# Patient Record
Sex: FEMALE | Race: WHITE | NOT HISPANIC OR LATINO | Employment: PART TIME | URBAN - METROPOLITAN AREA
[De-identification: names, ages, dates, MRNs, and addresses within clinical notes are randomized per-mention and may not be internally consistent; named-entity substitution may affect disease eponyms.]

---

## 2017-01-13 ENCOUNTER — THERAPY VISIT (OUTPATIENT)
Dept: PHYSICAL THERAPY | Facility: CLINIC | Age: 47
End: 2017-01-13
Payer: COMMERCIAL

## 2017-01-13 DIAGNOSIS — R26.9 ABNORMAL GAIT: ICD-10-CM

## 2017-01-13 DIAGNOSIS — Z98.890 S/P MENISCECTOMY: Primary | ICD-10-CM

## 2017-01-13 DIAGNOSIS — M25.561 RIGHT KNEE PAIN, UNSPECIFIED CHRONICITY: ICD-10-CM

## 2017-01-13 PROCEDURE — 97110 THERAPEUTIC EXERCISES: CPT | Mod: GP | Performed by: PHYSICAL THERAPIST

## 2017-01-13 PROCEDURE — 97140 MANUAL THERAPY 1/> REGIONS: CPT | Mod: GP | Performed by: PHYSICAL THERAPIST

## 2017-01-13 PROCEDURE — 97010 HOT OR COLD PACKS THERAPY: CPT | Mod: GP | Performed by: PHYSICAL THERAPIST

## 2017-01-13 ASSESSMENT — ACTIVITIES OF DAILY LIVING (ADL)
SIT WITH YOUR KNEE BENT: ACTIVITY IS MINIMALLY DIFFICULT
AS_A_RESULT_OF_YOUR_KNEE_INJURY,_HOW_WOULD_YOU_RATE_YOUR_CURRENT_LEVEL_OF_DAILY_ACTIVITY?: ABNORMAL
GO DOWN STAIRS: ACTIVITY IS FAIRLY DIFFICULT
KNEE_ACTIVITY_OF_DAILY_LIVING_SUM: 40
HOW_WOULD_YOU_RATE_THE_CURRENT_FUNCTION_OF_YOUR_KNEE_DURING_YOUR_USUAL_DAILY_ACTIVITIES_ON_A_SCALE_FROM_0_TO_100_WITH_100_BEING_YOUR_LEVEL_OF_KNEE_FUNCTION_PRIOR_TO_YOUR_INJURY_AND_0_BEING_THE_INABILITY_TO_PERFORM_ANY_OF_YOUR_USUAL_DAILY_ACTIVITIES?: 60
STAND: ACTIVITY IS MINIMALLY DIFFICULT
WALK: ACTIVITY IS SOMEWHAT DIFFICULT
GIVING WAY, BUCKLING OR SHIFTING OF KNEE: THE SYMPTOM AFFECTS MY ACTIVITY SLIGHTLY
WEAKNESS: THE SYMPTOM AFFECTS MY ACTIVITY SLIGHTLY
STIFFNESS: THE SYMPTOM AFFECTS MY ACTIVITY MODERATELY
SWELLING: THE SYMPTOM AFFECTS MY ACTIVITY SLIGHTLY
GO UP STAIRS: ACTIVITY IS SOMEWHAT DIFFICULT
RAW_SCORE: 40
RISE FROM A CHAIR: ACTIVITY IS MINIMALLY DIFFICULT
SQUAT: ACTIVITY IS SOMEWHAT DIFFICULT
HOW_WOULD_YOU_RATE_THE_OVERALL_FUNCTION_OF_YOUR_KNEE_DURING_YOUR_USUAL_DAILY_ACTIVITIES?: ABNORMAL
KNEE_ACTIVITY_OF_DAILY_LIVING_SCORE: 57.14
KNEEL ON THE FRONT OF YOUR KNEE: ACTIVITY IS VERY DIFFICULT
LIMPING: THE SYMPTOM AFFECTS MY ACTIVITY SLIGHTLY
PAIN: THE SYMPTOM AFFECTS MY ACTIVITY MODERATELY

## 2017-01-19 ENCOUNTER — THERAPY VISIT (OUTPATIENT)
Dept: PHYSICAL THERAPY | Facility: CLINIC | Age: 47
End: 2017-01-19
Payer: COMMERCIAL

## 2017-01-19 DIAGNOSIS — R26.9 ABNORMAL GAIT: ICD-10-CM

## 2017-01-19 DIAGNOSIS — Z98.890 S/P MENISCECTOMY: Primary | ICD-10-CM

## 2017-01-19 DIAGNOSIS — M25.561 RIGHT KNEE PAIN, UNSPECIFIED CHRONICITY: ICD-10-CM

## 2017-01-19 PROCEDURE — 97010 HOT OR COLD PACKS THERAPY: CPT | Mod: GP | Performed by: PHYSICAL THERAPIST

## 2017-01-19 PROCEDURE — 97140 MANUAL THERAPY 1/> REGIONS: CPT | Mod: GP | Performed by: PHYSICAL THERAPIST

## 2017-01-19 PROCEDURE — 97110 THERAPEUTIC EXERCISES: CPT | Mod: GP | Performed by: PHYSICAL THERAPIST

## 2017-01-19 ASSESSMENT — ACTIVITIES OF DAILY LIVING (ADL)
WALK: ACTIVITY IS SOMEWHAT DIFFICULT
KNEE_ACTIVITY_OF_DAILY_LIVING_SCORE: 55.71
GO UP STAIRS: ACTIVITY IS SOMEWHAT DIFFICULT
WEAKNESS: THE SYMPTOM AFFECTS MY ACTIVITY SLIGHTLY
RAW_SCORE: 39
KNEE_ACTIVITY_OF_DAILY_LIVING_SUM: 39
SQUAT: ACTIVITY IS FAIRLY DIFFICULT
STIFFNESS: THE SYMPTOM AFFECTS MY ACTIVITY MODERATELY
RISE FROM A CHAIR: ACTIVITY IS MINIMALLY DIFFICULT
GO DOWN STAIRS: ACTIVITY IS FAIRLY DIFFICULT
PAIN: THE SYMPTOM AFFECTS MY ACTIVITY MODERATELY
AS_A_RESULT_OF_YOUR_KNEE_INJURY,_HOW_WOULD_YOU_RATE_YOUR_CURRENT_LEVEL_OF_DAILY_ACTIVITY?: ABNORMAL
LIMPING: THE SYMPTOM AFFECTS MY ACTIVITY SLIGHTLY
GIVING WAY, BUCKLING OR SHIFTING OF KNEE: THE SYMPTOM AFFECTS MY ACTIVITY SLIGHTLY
SIT WITH YOUR KNEE BENT: ACTIVITY IS MINIMALLY DIFFICULT
HOW_WOULD_YOU_RATE_THE_CURRENT_FUNCTION_OF_YOUR_KNEE_DURING_YOUR_USUAL_DAILY_ACTIVITIES_ON_A_SCALE_FROM_0_TO_100_WITH_100_BEING_YOUR_LEVEL_OF_KNEE_FUNCTION_PRIOR_TO_YOUR_INJURY_AND_0_BEING_THE_INABILITY_TO_PERFORM_ANY_OF_YOUR_USUAL_DAILY_ACTIVITIES?: 65
HOW_WOULD_YOU_RATE_THE_OVERALL_FUNCTION_OF_YOUR_KNEE_DURING_YOUR_USUAL_DAILY_ACTIVITIES?: ABNORMAL
STAND: ACTIVITY IS MINIMALLY DIFFICULT
KNEEL ON THE FRONT OF YOUR KNEE: ACTIVITY IS VERY DIFFICULT
SWELLING: THE SYMPTOM AFFECTS MY ACTIVITY SLIGHTLY

## 2017-02-15 ENCOUNTER — THERAPY VISIT (OUTPATIENT)
Dept: PHYSICAL THERAPY | Facility: CLINIC | Age: 47
End: 2017-02-15
Payer: COMMERCIAL

## 2017-02-15 DIAGNOSIS — M25.561 RIGHT KNEE PAIN, UNSPECIFIED CHRONICITY: ICD-10-CM

## 2017-02-15 DIAGNOSIS — Z98.890 S/P MENISCECTOMY: ICD-10-CM

## 2017-02-15 DIAGNOSIS — R26.9 ABNORMAL GAIT: ICD-10-CM

## 2017-02-15 PROCEDURE — 97140 MANUAL THERAPY 1/> REGIONS: CPT | Mod: GP | Performed by: PHYSICAL THERAPIST

## 2017-02-15 PROCEDURE — 97530 THERAPEUTIC ACTIVITIES: CPT | Mod: GP | Performed by: PHYSICAL THERAPIST

## 2017-02-15 PROCEDURE — 97110 THERAPEUTIC EXERCISES: CPT | Mod: GP | Performed by: PHYSICAL THERAPIST

## 2017-02-15 ASSESSMENT — ACTIVITIES OF DAILY LIVING (ADL)
GIVING WAY, BUCKLING OR SHIFTING OF KNEE: THE SYMPTOM AFFECTS MY ACTIVITY SLIGHTLY
SWELLING: THE SYMPTOM AFFECTS MY ACTIVITY SLIGHTLY
HOW_WOULD_YOU_RATE_THE_CURRENT_FUNCTION_OF_YOUR_KNEE_DURING_YOUR_USUAL_DAILY_ACTIVITIES_ON_A_SCALE_FROM_0_TO_100_WITH_100_BEING_YOUR_LEVEL_OF_KNEE_FUNCTION_PRIOR_TO_YOUR_INJURY_AND_0_BEING_THE_INABILITY_TO_PERFORM_ANY_OF_YOUR_USUAL_DAILY_ACTIVITIES?: 65
GO DOWN STAIRS: ACTIVITY IS FAIRLY DIFFICULT
KNEE_ACTIVITY_OF_DAILY_LIVING_SCORE: 64.29
STIFFNESS: THE SYMPTOM AFFECTS MY ACTIVITY MODERATELY
KNEEL ON THE FRONT OF YOUR KNEE: ACTIVITY IS FAIRLY DIFFICULT
KNEE_ACTIVITY_OF_DAILY_LIVING_SUM: 45
GO UP STAIRS: ACTIVITY IS SOMEWHAT DIFFICULT
SQUAT: ACTIVITY IS SOMEWHAT DIFFICULT
RISE FROM A CHAIR: ACTIVITY IS NOT DIFFICULT
LIMPING: THE SYMPTOM AFFECTS MY ACTIVITY SLIGHTLY
HOW_WOULD_YOU_RATE_THE_OVERALL_FUNCTION_OF_YOUR_KNEE_DURING_YOUR_USUAL_DAILY_ACTIVITIES?: ABNORMAL
WEAKNESS: THE SYMPTOM AFFECTS MY ACTIVITY SLIGHTLY
SIT WITH YOUR KNEE BENT: ACTIVITY IS NOT DIFFICULT
WALK: ACTIVITY IS MINIMALLY DIFFICULT
PAIN: THE SYMPTOM AFFECTS MY ACTIVITY MODERATELY
AS_A_RESULT_OF_YOUR_KNEE_INJURY,_HOW_WOULD_YOU_RATE_YOUR_CURRENT_LEVEL_OF_DAILY_ACTIVITY?: ABNORMAL
STAND: ACTIVITY IS NOT DIFFICULT
RAW_SCORE: 45

## 2017-02-27 ENCOUNTER — THERAPY VISIT (OUTPATIENT)
Dept: PHYSICAL THERAPY | Facility: CLINIC | Age: 47
End: 2017-02-27
Payer: COMMERCIAL

## 2017-02-27 DIAGNOSIS — Z98.890 S/P MENISCECTOMY: ICD-10-CM

## 2017-02-27 DIAGNOSIS — M25.561 RIGHT KNEE PAIN: ICD-10-CM

## 2017-02-27 DIAGNOSIS — R26.9 ABNORMAL GAIT: ICD-10-CM

## 2017-02-27 PROCEDURE — 97110 THERAPEUTIC EXERCISES: CPT | Mod: GP | Performed by: PHYSICAL THERAPY ASSISTANT

## 2017-02-27 PROCEDURE — 97140 MANUAL THERAPY 1/> REGIONS: CPT | Mod: GP | Performed by: PHYSICAL THERAPY ASSISTANT

## 2017-02-27 PROCEDURE — 97530 THERAPEUTIC ACTIVITIES: CPT | Mod: GP | Performed by: PHYSICAL THERAPY ASSISTANT

## 2017-03-13 ENCOUNTER — THERAPY VISIT (OUTPATIENT)
Dept: PHYSICAL THERAPY | Facility: CLINIC | Age: 47
End: 2017-03-13
Payer: COMMERCIAL

## 2017-03-13 DIAGNOSIS — R26.9 ABNORMAL GAIT: ICD-10-CM

## 2017-03-13 DIAGNOSIS — Z98.890 S/P MENISCECTOMY: ICD-10-CM

## 2017-03-13 DIAGNOSIS — M25.561 RIGHT KNEE PAIN: ICD-10-CM

## 2017-03-13 PROCEDURE — 97110 THERAPEUTIC EXERCISES: CPT | Mod: GP | Performed by: PHYSICAL THERAPY ASSISTANT

## 2017-03-13 PROCEDURE — 97140 MANUAL THERAPY 1/> REGIONS: CPT | Mod: GP | Performed by: PHYSICAL THERAPY ASSISTANT

## 2017-03-13 PROCEDURE — 97530 THERAPEUTIC ACTIVITIES: CPT | Mod: GP | Performed by: PHYSICAL THERAPY ASSISTANT

## 2017-03-13 ASSESSMENT — ACTIVITIES OF DAILY LIVING (ADL)
KNEEL ON THE FRONT OF YOUR KNEE: ACTIVITY IS FAIRLY DIFFICULT
LIMPING: THE SYMPTOM AFFECTS MY ACTIVITY MODERATELY
GIVING WAY, BUCKLING OR SHIFTING OF KNEE: THE SYMPTOM AFFECTS MY ACTIVITY SLIGHTLY
RISE FROM A CHAIR: ACTIVITY IS MINIMALLY DIFFICULT
WEAKNESS: THE SYMPTOM AFFECTS MY ACTIVITY SLIGHTLY
SWELLING: THE SYMPTOM AFFECTS MY ACTIVITY SLIGHTLY
PAIN: THE SYMPTOM AFFECTS MY ACTIVITY MODERATELY
SIT WITH YOUR KNEE BENT: ACTIVITY IS NOT DIFFICULT
HOW_WOULD_YOU_RATE_THE_CURRENT_FUNCTION_OF_YOUR_KNEE_DURING_YOUR_USUAL_DAILY_ACTIVITIES_ON_A_SCALE_FROM_0_TO_100_WITH_100_BEING_YOUR_LEVEL_OF_KNEE_FUNCTION_PRIOR_TO_YOUR_INJURY_AND_0_BEING_THE_INABILITY_TO_PERFORM_ANY_OF_YOUR_USUAL_DAILY_ACTIVITIES?: 70
WALK: ACTIVITY IS MINIMALLY DIFFICULT
KNEE_ACTIVITY_OF_DAILY_LIVING_SUM: 42
STAND: ACTIVITY IS MINIMALLY DIFFICULT
SQUAT: ACTIVITY IS SOMEWHAT DIFFICULT
GO UP STAIRS: ACTIVITY IS SOMEWHAT DIFFICULT
GO DOWN STAIRS: ACTIVITY IS FAIRLY DIFFICULT
RAW_SCORE: 42
STIFFNESS: THE SYMPTOM AFFECTS MY ACTIVITY MODERATELY
HOW_WOULD_YOU_RATE_THE_OVERALL_FUNCTION_OF_YOUR_KNEE_DURING_YOUR_USUAL_DAILY_ACTIVITIES?: ABNORMAL
AS_A_RESULT_OF_YOUR_KNEE_INJURY,_HOW_WOULD_YOU_RATE_YOUR_CURRENT_LEVEL_OF_DAILY_ACTIVITY?: ABNORMAL
KNEE_ACTIVITY_OF_DAILY_LIVING_SCORE: 60

## 2017-04-19 DIAGNOSIS — Z12.31 VISIT FOR SCREENING MAMMOGRAM: ICD-10-CM

## 2017-04-19 PROCEDURE — G0202 SCR MAMMO BI INCL CAD: HCPCS | Performed by: RADIOLOGY

## 2017-06-08 ENCOUNTER — OFFICE VISIT (OUTPATIENT)
Dept: FAMILY MEDICINE | Facility: CLINIC | Age: 47
End: 2017-06-08
Payer: COMMERCIAL

## 2017-06-08 ENCOUNTER — RADIANT APPOINTMENT (OUTPATIENT)
Dept: GENERAL RADIOLOGY | Facility: CLINIC | Age: 47
End: 2017-06-08
Attending: NURSE PRACTITIONER
Payer: COMMERCIAL

## 2017-06-08 VITALS
HEIGHT: 65 IN | TEMPERATURE: 98.3 F | HEART RATE: 72 BPM | SYSTOLIC BLOOD PRESSURE: 140 MMHG | OXYGEN SATURATION: 98 % | WEIGHT: 241.4 LBS | BODY MASS INDEX: 40.22 KG/M2 | DIASTOLIC BLOOD PRESSURE: 80 MMHG

## 2017-06-08 DIAGNOSIS — R03.0 ELEVATED BLOOD PRESSURE READING WITHOUT DIAGNOSIS OF HYPERTENSION: ICD-10-CM

## 2017-06-08 DIAGNOSIS — G89.29 OTHER CHRONIC PAIN: ICD-10-CM

## 2017-06-08 DIAGNOSIS — M79.671 RIGHT FOOT PAIN: ICD-10-CM

## 2017-06-08 DIAGNOSIS — M79.671 RIGHT FOOT PAIN: Primary | ICD-10-CM

## 2017-06-08 LAB
AMPHETAMINES UR QL: NORMAL NG/ML
BARBITURATES UR QL SCN: NORMAL NG/ML
BENZODIAZ UR QL SCN: NORMAL NG/ML
BUPRENORPHINE UR QL: NORMAL NG/ML
CANNABINOIDS UR QL: NORMAL NG/ML
COCAINE UR QL SCN: NORMAL NG/ML
D-METHAMPHET UR QL: NORMAL NG/ML
METHADONE UR QL SCN: NORMAL NG/ML
OPIATES UR QL SCN: NORMAL NG/ML
OXYCODONE UR QL SCN: NORMAL NG/ML
PCP UR QL SCN: NORMAL NG/ML
PROPOXYPH UR QL: NORMAL NG/ML
TRICYCLICS UR QL SCN: NORMAL NG/ML

## 2017-06-08 PROCEDURE — 73630 X-RAY EXAM OF FOOT: CPT | Mod: RT

## 2017-06-08 PROCEDURE — 99213 OFFICE O/P EST LOW 20 MIN: CPT | Performed by: NURSE PRACTITIONER

## 2017-06-08 PROCEDURE — 80306 DRUG TEST PRSMV INSTRMNT: CPT | Performed by: NURSE PRACTITIONER

## 2017-06-08 ASSESSMENT — ANXIETY QUESTIONNAIRES
3. WORRYING TOO MUCH ABOUT DIFFERENT THINGS: SEVERAL DAYS
5. BEING SO RESTLESS THAT IT IS HARD TO SIT STILL: SEVERAL DAYS
IF YOU CHECKED OFF ANY PROBLEMS ON THIS QUESTIONNAIRE, HOW DIFFICULT HAVE THESE PROBLEMS MADE IT FOR YOU TO DO YOUR WORK, TAKE CARE OF THINGS AT HOME, OR GET ALONG WITH OTHER PEOPLE: NOT DIFFICULT AT ALL
2. NOT BEING ABLE TO STOP OR CONTROL WORRYING: NOT AT ALL
6. BECOMING EASILY ANNOYED OR IRRITABLE: SEVERAL DAYS
GAD7 TOTAL SCORE: 3
1. FEELING NERVOUS, ANXIOUS, OR ON EDGE: NOT AT ALL
7. FEELING AFRAID AS IF SOMETHING AWFUL MIGHT HAPPEN: NOT AT ALL

## 2017-06-08 ASSESSMENT — PATIENT HEALTH QUESTIONNAIRE - PHQ9: 5. POOR APPETITE OR OVEREATING: NOT AT ALL

## 2017-06-08 NOTE — PATIENT INSTRUCTIONS
I will let you know if the radiologist sees a fracture, I do not.  Contusions can take time to heal, ibuprofen, ice, elevation, rest can help. Follow up if your symptoms persist more than a few weeks, or worsen.     Foot Contusion  You have a contusion. This is also called a bruise. There is swelling and some bleeding under the skin, but no broken bones. This injury generally takes a few days to a few weeks to heal.  During that time, the bruise will typically change in color from reddish, to purple-blue, to greenish-yellow, then to yellow-brown.  Home care    Elevate the foot to reduce pain and swelling. As much as possible, sit or lie down with the foot raised about the level of your heart. This is especially important during the first 48 hours.    Ice the foot to help reduce pain and swelling. Wrap a cold source (ice pack or ice cubes in a plastic bag) in a thin towel. Apply to the bruised area for 20 minutes every 1 to 2 hours the first day. Continue this 3 to 4 times a day until the pain and swelling goes away.    Unless another medication was prescribed, you can take acetaminophen, ibuprofen, or naproxen to control pain. (If you have chronic liver or kidney disease or ever had a stomach ulcer or GI bleeding, talk with your doctor before using these medicines.)  Follow up  Follow up with your health care provider or our staff as advised. Call if you are not improving within 1 to 2 weeks.  When to seek medical advice   Call your health care provider right away if you have any of the following:    Increased pain or swelling    Foot or leg becomes cold, blue, numb or tingly    Signs of infection: Warmth, drainage, or increased redness or pain around the bruise    Inability to move the injured foot     Frequent bruising for unknown reasons    4836-5423 [employer]. [address] [place] [address], [place] [address]. All rights reserved. This information is not intended as a substitute for professional medical  care. Always follow your healthcare professional's instructions.

## 2017-06-08 NOTE — NURSING NOTE
"Chief Complaint   Patient presents with     Musculoskeletal Problem       Initial /90  Pulse 72  Temp 98.3  F (36.8  C) (Oral)  Ht 5' 5.35\" (1.66 m)  Wt 241 lb 6.4 oz (109.5 kg)  SpO2 98%  BMI 39.74 kg/m2 Estimated body mass index is 39.74 kg/(m^2) as calculated from the following:    Height as of this encounter: 5' 5.35\" (1.66 m).    Weight as of this encounter: 241 lb 6.4 oz (109.5 kg).  Medication Reconciliation: complete     Paz Herndon MA  "

## 2017-06-08 NOTE — MR AVS SNAPSHOT
After Visit Summary   6/8/2017    Janelle Byrd    MRN: [id number]           Patient Information     Date Of Birth          1970        Visit Information        Provider Department      6/8/2017 9:00 AM Maria Esther Farris NP [facility]        Today's Diagnoses     Right foot pain    -  1    Other chronic pain          Care Instructions    I will let you know if the radiologist sees a fracture, I do not.  Contusions can take time to heal, ibuprofen, ice, elevation, rest can help. Follow up if your symptoms persist more than a few weeks, or worsen.     Foot Contusion  You have a contusion. This is also called a bruise. There is swelling and some bleeding under the skin, but no broken bones. This injury generally takes a few days to a few weeks to heal.  During that time, the bruise will typically change in color from reddish, to purple-blue, to greenish-yellow, then to yellow-brown.  Home care    Elevate the foot to reduce pain and swelling. As much as possible, sit or lie down with the foot raised about the level of your heart. This is especially important during the first 48 hours.    Ice the foot to help reduce pain and swelling. Wrap a cold source (ice pack or ice cubes in a plastic bag) in a thin towel. Apply to the bruised area for 20 minutes every 1 to 2 hours the first day. Continue this 3 to 4 times a day until the pain and swelling goes away.    Unless another medication was prescribed, you can take acetaminophen, ibuprofen, or naproxen to control pain. (If you have chronic liver or kidney disease or ever had a stomach ulcer or GI bleeding, talk with your doctor before using these medicines.)  Follow up  Follow up with your health care provider or our staff as advised. Call if you are not improving within 1 to 2 weeks.  When to seek medical advice   Call your health care provider right away if you have any of the following:    Increased pain or swelling    Foot  or leg becomes cold, blue, numb or tingly    Signs of infection: Warmth, drainage, or increased redness or pain around the bruise    Inability to move the injured foot     Frequent bruising for unknown reasons    5888-6085 [employer]. [address] [place] [address]. All rights reserved. This information is not intended as a substitute for professional medical care. Always follow your healthcare professional's instructions.                Follow-ups after your visit        Follow-up notes from your care team     Return if symptoms worsen or fail to improve.      Who to contact     Normal or non-critical lab and imaging results will be communicated to you by [employer], letter or phone within 4 business days after the clinic has received the results. If you do not hear from us within 7 days, please contact the clinic through [employer] or phone. If you have a critical or abnormal lab result, we will notify you by phone as soon as possible.  Submit refill requests through [employer] or call your pharmacy and they will forward the refill request to us. Please allow 3 business days for your refill to be completed.          If you need to speak with a  for additional information , please call: [phone]             Additional Information About Your Visit        [employer] Information     [employer] gives you secure access to your electronic health record. If you see a primary care provider, you can also send messages to your care team and make appointments. If you have questions, please call your primary care clinic.  If you do not have a primary care provider, please call [phone] and they will assist you.        Care EveryWhere ID     This is your Care EveryWhere ID. This could be used by other organizations to access your [facility] medical records  [id number]        Your Vitals Were     Pulse Temperature Height Pulse Oximetry BMI (Body Mass Index)       72 98.3  F (36.8  C) (Oral) 5'  "5.35\" (1.66 m) 98% 39.74 kg/m2        Blood Pressure from Last 3 Encounters:   06/08/17 142/90   12/21/16 126/70   11/15/16 122/78    Weight from Last 3 Encounters:   06/08/17 241 lb 6.4 oz (109.5 kg)   12/21/16 247 lb 9.6 oz (112.3 kg)   12/09/16 246 lb (111.6 kg)              We Performed the Following     Drug Abuse Screen Panel 13, Urine (Pain Care Package)        Primary Care Provider Office Phone # Fax #    Yamilka Hill PA-C [phone]       [address] [address]        Thank you!     Thank you for choosing [facility] [place]  for your care. Our goal is always to provide you with excellent care. Hearing back from our patients is one way we can continue to improve our services. Please take a few minutes to complete the written survey that you may receive in the mail after your visit with us. Thank you!             Your Updated Medication List - Protect others around you: Learn how to safely use, store and throw away your medicines at www.disposemymeds.org.          This list is accurate as of: 6/8/17  9:37 AM.  Always use your most recent med list.                   Brand Name Dispense Instructions for use    ALEVE PO      Take 220 mg by mouth       ibuprofen 600 MG tablet    ADVIL/MOTRIN    30 tablet    Take 1 tablet (600 mg) by mouth every 6 hours as needed for pain (mild)       metroNIDAZOLE 0.75 % topical gel    METROGEL     USE 1 APPLICATION QD       Multi-vitamin Tabs tablet   Generic drug:  multivitamin, therapeutic with minerals      1 tablet daily       nystatin 104939 UNIT/GM Powd    MYCOSTATIN    30 g    Apply topically 3 times daily as needed         "

## 2017-06-08 NOTE — PROGRESS NOTES
SUBJECTIVE:                                                    Janelle Byrd is a 46 year old female who presents to clinic today for the following health issues:      Muscle/Joint Pain     Onset: 2 weeks    Description:   Location: right foot  Character: Sharp    Progression of Symptoms: worse    Accompanying Signs & Symptoms:  Other symptoms: swelling, pain when wiggling toes    History:   Previous similar pain: no       Precipitating factors:   Trauma or overuse: YES- dropped a bottle of shampoo on it    Alleviating factors:  Improved by: nothing  Therapies Tried and outcome: ibu-no improvement, ice helps for a while then pain comes back         Problem list and histories reviewed & adjusted, as indicated.  Additional history: as documented    Patient Active Problem List   Diagnosis     CARDIOVASCULAR SCREENING; LDL GOAL LESS THAN 160     Umbilical discharge     Hip pain     Low back pain     Obesity     Anxiety     Chronic pain of right knee     Primary osteoarthritis of right knee     Complex tear of medial meniscus of right knee as current injury, initial encounter     S/P meniscectomy     Right knee pain     Abnormal gait     Past Surgical History:   Procedure Laterality Date     ARTHROSCOPY SHOULDER  10/2004    LT/to remove calcium deposits     ARTHROSCOPY SHOULDER DECOMPRESSION Left 2014    Procedure: ARTHROSCOPY SHOULDER DECOMPRESSION;  Surgeon: Julien Maciel MD;  Location: [facility] OR     BIOPSY  2001    skin cancer from nose     C/SECTION, LOW TRANSVERSE  , 2008    , Low Transverse     DILATION AND CURETTAGE, OPERATIVE HYSTEROSCOPY WITH MORCELLATOR, COMBINED N/A 2016    Procedure: COMBINED DILATION AND CURETTAGE, OPERATIVE HYSTEROSCOPY WITH MORCELLATOR;  Surgeon: Idalia Mcarthur MD;  Location: [facility] OR       Social History   Substance Use Topics     Smoking status: Never Smoker     Smokeless tobacco: Never Used     Alcohol use Yes      Comment: rare      Family History   Problem Relation Age of Onset     Lipids Mother      Arthritis Mother      Psychotic Disorder Mother      Respiratory Mother      copd     Circulatory Mother      BLOOD CLOTS     Genitourinary Problems Mother      Heart Failure Mother      Depression Mother      MENTAL ILLNESS Mother      Bi-polar     Obesity Mother      Lipids Father      Alcohol/Drug Father      Arthritis Father      C.A.D. Father      Cancer - colorectal Father      Hypertension Father      HEART DISEASE Maternal Grandmother      Breast Cancer Maternal Grandmother      Alcohol/Drug Maternal Grandfather      CEREBROVASCULAR DISEASE Maternal Grandfather      Alcohol/Drug Paternal Grandmother      Hypertension Paternal Grandmother      C.A.D. Paternal Grandfather      GASTROINTESTINAL DISEASE Paternal Grandfather      liver diease     Alcohol/Drug Paternal Grandfather      CEREBROVASCULAR DISEASE Paternal Grandfather      Circulatory Sister      BLOOD CLOTS         Current Outpatient Prescriptions   Medication Sig Dispense Refill     nystatin (MYCOSTATIN) 595156 UNIT/GM POWD Apply topically 3 times daily as needed 30 g 1     Naproxen Sodium (ALEVE PO) Take 220 mg by mouth       metroNIDAZOLE (METROGEL) 0.75 % gel USE 1 APPLICATION QD  3     ibuprofen (ADVIL,MOTRIN) 600 MG tablet Take 1 tablet (600 mg) by mouth every 6 hours as needed for pain (mild) 30 tablet 0     MULTI-VITAMIN OR TABS 1 tablet daily  0     Allergies   Allergen Reactions     Depo-Medrol [Methylprednisolone] Other (See Comments)     Oral thrush after receiving an intra-articular injection, reports that she has family members that have also had this symptom following intra-articular injections.     Dust Mites      Sneezing     Mold Cough     Tape [Adhesive Tape] Rash     BP Readings from Last 3 Encounters:   06/08/17 140/80   12/21/16 126/70   11/15/16 122/78    Wt Readings from Last 3 Encounters:   06/08/17 241 lb 6.4 oz (109.5 kg)   12/21/16 247 lb 9.6 oz (112.3  "kg)   12/09/16 246 lb (111.6 kg)            Labs reviewed in EPIC    Reviewed and updated as needed this visit by clinical staff  Tobacco  Allergies  Meds  Med Hx  Surg Hx  Fam Hx  Soc Hx      Reviewed and updated as needed this visit by Provider         ROS:  Constitutional, HEENT, cardiovascular, pulmonary, GI, , musculoskeletal, neuro, skin, endocrine and psych systems are negative, except as otherwise noted.    OBJECTIVE:                                                    /80  Pulse 72  Temp 98.3  F (36.8  C) (Oral)  Ht 5' 5.35\" (1.66 m)  Wt 241 lb 6.4 oz (109.5 kg)  SpO2 98%  BMI 39.74 kg/m2  Body mass index is 39.74 kg/(m^2).  GENERAL: healthy, alert and no distress  RESP: lungs clear to auscultation - no rales, rhonchi or wheezes  CV: regular rate and rhythm, normal S1 S2, no S3 or S4, no murmur, click or rub, no peripheral edema and peripheral pulses strong  MS: no gross musculoskeletal defects noted, POSITIVE for slight edema to top of R foot, limited ROM of R 4th and 5th toes   SKIN: no suspicious lesions or rashes  NEURO: Normal strength and tone, mentation intact and speech normal  PSYCH: mentation appears normal, affect normal/bright    Diagnostic Test Results:  See orders     ASSESSMENT/PLAN:                                                        BP Screening:   Last 3 BP Readings:    BP Readings from Last 3 Encounters:   06/08/17 140/80   12/21/16 126/70   11/15/16 122/78       The following was recommended to the patient:  Re-screen BP within a year and recommended lifestyle modifications      ICD-10-CM    1. Right foot pain M79.671 XR Foot Right G/E 3 Views   2. Other chronic pain G89.29 Drug Abuse Screen Panel 13, Urine (Pain Care Package)   3. Elevated blood pressure reading without diagnosis of hypertension R03.0        See Patient Instructions: I will let you know if the radiologist sees a fracture, I do not.  Contusions can take time to heal, ibuprofen, ice, elevation, rest " can help. Follow up if your symptoms persist more than a few weeks, or worsen. Discussed monitoring BP and follow-up.     BOB Swenson  [facility] [place]

## 2017-06-08 NOTE — PROGRESS NOTES
Hunter Luis,    Thank you for your recent office visit.    Here are your recent results.  Normal foot xray.     Feel free to contact me via [employer] or call the clinic at [phone].    Sincerely,    JEANETTE Rivas, FNP-BC

## 2017-06-08 NOTE — PROGRESS NOTES
Hunter Luis,    Thank you for your recent office visit.    Here are your recent results.  Normal lab results.     Feel free to contact me via [employer] or call the clinic at [phone].    Sincerely,    JEANETTE Rivas, FNP-BC

## 2017-06-09 ENCOUNTER — MYC MEDICAL ADVICE (OUTPATIENT)
Dept: FAMILY MEDICINE | Facility: CLINIC | Age: 47
End: 2017-06-09

## 2017-06-09 ASSESSMENT — ANXIETY QUESTIONNAIRES: GAD7 TOTAL SCORE: 3

## 2017-06-09 ASSESSMENT — PATIENT HEALTH QUESTIONNAIRE - PHQ9: SUM OF ALL RESPONSES TO PHQ QUESTIONS 1-9: 4

## 2017-11-15 ENCOUNTER — TRANSFERRED RECORDS (OUTPATIENT)
Dept: HEALTH INFORMATION MANAGEMENT | Facility: CLINIC | Age: 47
End: 2017-11-15

## 2018-01-02 ENCOUNTER — OFFICE VISIT (OUTPATIENT)
Dept: FAMILY MEDICINE | Facility: CLINIC | Age: 48
End: 2018-01-02
Payer: COMMERCIAL

## 2018-01-02 VITALS
RESPIRATION RATE: 12 BRPM | HEART RATE: 86 BPM | BODY MASS INDEX: 41.94 KG/M2 | TEMPERATURE: 96.6 F | DIASTOLIC BLOOD PRESSURE: 78 MMHG | SYSTOLIC BLOOD PRESSURE: 120 MMHG | WEIGHT: 254.8 LBS

## 2018-01-02 DIAGNOSIS — R03.0 ELEVATED BLOOD PRESSURE READING WITHOUT DIAGNOSIS OF HYPERTENSION: Primary | ICD-10-CM

## 2018-01-02 PROCEDURE — 99213 OFFICE O/P EST LOW 20 MIN: CPT | Performed by: FAMILY MEDICINE

## 2018-01-02 ASSESSMENT — ANXIETY QUESTIONNAIRES
GAD7 TOTAL SCORE: 9
7. FEELING AFRAID AS IF SOMETHING AWFUL MIGHT HAPPEN: NOT AT ALL
1. FEELING NERVOUS, ANXIOUS, OR ON EDGE: MORE THAN HALF THE DAYS
3. WORRYING TOO MUCH ABOUT DIFFERENT THINGS: SEVERAL DAYS
2. NOT BEING ABLE TO STOP OR CONTROL WORRYING: SEVERAL DAYS
6. BECOMING EASILY ANNOYED OR IRRITABLE: MORE THAN HALF THE DAYS
5. BEING SO RESTLESS THAT IT IS HARD TO SIT STILL: SEVERAL DAYS

## 2018-01-02 ASSESSMENT — PATIENT HEALTH QUESTIONNAIRE - PHQ9: 5. POOR APPETITE OR OVEREATING: MORE THAN HALF THE DAYS

## 2018-01-02 NOTE — NURSING NOTE
"Chief Complaint   Patient presents with     Hypertension       Initial /78  Pulse 86  Temp 96.6  F (35.9  C) (Oral)  Resp 12  Wt 254 lb 12.8 oz (115.6 kg)  BMI 41.94 kg/m2 Estimated body mass index is 41.94 kg/(m^2) as calculated from the following:    Height as of 6/8/17: 5' 5.35\" (1.66 m).    Weight as of this encounter: 254 lb 12.8 oz (115.6 kg).  Medication Reconciliation: complete     Kiera Chirinos MA    "

## 2018-01-02 NOTE — PROGRESS NOTES
SUBJECTIVE:   Janelle Byrd is a 47 year old female who presents to clinic today for the following health issues:      Hypertension Follow-up      Outpatient blood pressures are being checked at home.  Results are 146-173/78-92.    Low Salt Diet: low salt    Amount of exercise or physical activity: 1 day/week for an average of 15-30 minutes    Problems taking medications regularly: No    Medication side effects: none    Diet: regular (no restrictions)    HTN visit, check bp at home with wrist cuff  Usually 120/70 regularly at home, high on cuff yesterday afternoon 173/102 then stayed 160/80's -   felt light headed, some headaches as well, some vision changes as well  Not taking any medications for elevated blood pressure  Exercise - none  Diet - overall healthy    Problem list and histories reviewed & adjusted, as indicated.  Additional history: as documented    BP Readings from Last 3 Encounters:   01/02/18 120/78   06/08/17 140/80   12/21/16 126/70    Wt Readings from Last 3 Encounters:   01/02/18 254 lb 12.8 oz (115.6 kg)   06/08/17 241 lb 6.4 oz (109.5 kg)   12/21/16 247 lb 9.6 oz (112.3 kg)            Reviewed and updated as needed this visit by clinical staffTobacco  Allergies  Meds  Problems       Reviewed and updated as needed this visit by Provider  Allergies  Meds  Problems         ROS:  Constitutional, HEENT, cardiovascular, pulmonary, gi and gu systems are negative, except as otherwise noted.      OBJECTIVE:   /78  Pulse 86  Temp 96.6  F (35.9  C) (Oral)  Resp 12  Wt 254 lb 12.8 oz (115.6 kg)  BMI 41.94 kg/m2  Body mass index is 41.94 kg/(m^2).  GENERAL: healthy, alert and no distress  EYES: Eyes grossly normal to inspection, PERRL and conjunctivae and sclerae normal  HENT: ear canals and TM's normal, nose and mouth without ulcers or lesions  NECK: no adenopathy, no asymmetry, masses, or scars and thyroid normal to palpation  RESP: lungs clear to auscultation - no  rales, rhonchi or wheezes  CV: regular rate and rhythm, normal S1 S2, no S3 or S4, no murmur, click or rub, no peripheral edema and peripheral pulses strong  SKIN: no suspicious lesions or rashes  NEURO: Normal strength and tone, mentation intact and speech normal  PSYCH: mentation appears normal, affect normal/bright    ASSESSMENT/PLAN:     1. Elevated blood pressure reading without diagnosis of hypertension  Possibly situational given stress related to father who is ill with stage 3 lung cancer.  Most of the time her Bp is within normal range.  Bp log was given an patient will continue to monitor.  No medications necessary at this time.      Follow-up in 3 months    Renny Mullen MD  [facility]

## 2018-01-02 NOTE — PATIENT INSTRUCTIONS
[facility]    If you have any questions regarding to your visit please contact your care team:       Team Purple:   Clinic Hours Telephone Number   Dr. Lulu Mullen   7am-7pm  Monday - Thursday   7am-5pm  Fridays  [phone]  (Appointment scheduling available 24/7)    Questions about your Visit?   Team Line:  [phone]   Urgent Care - [place] and [facility] - 11am-9pm Monday-Friday Saturday-Sunday- 9am-5pm   [place] - 5pm-9pm Monday-Friday Saturday-Sunday- 9am-5pm  [phone] - [facility]  [phone] - [place]       What options do I have for visits at the clinic other than the traditional office visit?  To expand how we care for you, many of our providers are utilizing electronic visits (e-visits) and telephone visits, when medically appropriate, for interactions with their patients rather than a visit in the clinic.   We also offer nurse visits for many medical concerns. Just like any other service, we will bill your insurance company for this type of visit based on time spent on the phone with your provider. Not all insurance companies cover these visits. Please check with your medical insurance if this type of visit is covered. You will be responsible for any charges that are not paid by your insurance.      E-visits via [employer]:  generally incur a $35.00 fee.  Telephone visits:  Time spent on the phone: *charged based on time that is spent on the phone in increments of 10 minutes. Estimated cost:   5-10 mins $30.00   11-20 mins. $59.00   21-30 mins. $85.00     Use [email or web address] (secure email communication and access to your chart) to send your primary care provider a message or make an appointment. Ask someone on your Team how to sign up for [employer].  For a Price Quote for your services, please call our Consumer Price Line at [phone].  As always, Thank you for trusting us with your health care  needs!    Kiera Chirinos MA

## 2018-01-02 NOTE — MR AVS SNAPSHOT
After Visit Summary   1/2/2018    Janelle Byrd    MRN: [id number]           Patient Information     Date Of Birth          1970        Visit Information        Provider Department      1/2/2018 11:00 AM Renny Mullen MD [facility]        Care Instructions    [facility]    If you have any questions regarding to your visit please contact your care team:       Team Purple:   Clinic Hours Telephone Number   Dr. Lulu Mullen   7am-7pm  Monday - Thursday   7am-5pm  Fridays  [phone]  (Appointment scheduling available 24/7)    Questions about your Visit?   Team Line:  [phone]   Urgent Care - [place] and [facility] - 11am-9pm Monday-Friday Saturday-Sunday- 9am-5pm   [place] - 5pm-9pm Monday-Friday Saturday-Sunday- 9am-5pm  [phone] - [facility]  [phone] - [place]       What options do I have for visits at the clinic other than the traditional office visit?  To expand how we care for you, many of our providers are utilizing electronic visits (e-visits) and telephone visits, when medically appropriate, for interactions with their patients rather than a visit in the clinic.   We also offer nurse visits for many medical concerns. Just like any other service, we will bill your insurance company for this type of visit based on time spent on the phone with your provider. Not all insurance companies cover these visits. Please check with your medical insurance if this type of visit is covered. You will be responsible for any charges that are not paid by your insurance.      E-visits via AQUA PURE:  generally incur a $35.00 fee.  Telephone visits:  Time spent on the phone: *charged based on time that is spent on the phone in increments of 10 minutes. Estimated cost:   5-10 mins $30.00   11-20 mins. $59.00   21-30 mins. $85.00     Use AQUA PURE (secure  email communication and access to your chart) to send your primary care provider a message or make an appointment. Ask someone on your Team how to sign up for [employer]ia.  For a Price Quote for your services, please call our [place] Price Line at [phone].  As always, Thank you for trusting us with your health care needs!    Kiera Chirinos MA            Follow-ups after your visit        Follow-up notes from your care team     Return in about 3 months (around 4/2/2018).      Who to contact     If you have questions or need follow up information about today's clinic visit or your schedule please contact [facility] directly at [phone].  Normal or non-critical lab and imaging results will be communicated to you by Reach Proshart, letter or phone within 4 business days after the clinic has received the results. If you do not hear from us within 7 days, please contact the clinic through Reach Proshart or phone. If you have a critical or abnormal lab result, we will notify you by phone as soon as possible.  Submit refill requests through CorCardia or call your pharmacy and they will forward the refill request to us. Please allow 3 business days for your refill to be completed.          Additional Information About Your Visit        [employer] Information     CorCardia gives you secure access to your electronic health record. If you see a primary care provider, you can also send messages to your care team and make appointments. If you have questions, please call your primary care clinic.  If you do not have a primary care provider, please call [phone] and they will assist you.        Care EveryWhere ID     This is your Care EveryWhere ID. This could be used by other organizations to access your [place] medical records  [id number]        Your Vitals Were     Pulse Temperature Respirations BMI (Body Mass Index)          86 96.6  F (35.9  C) (Oral) 12 41.94 kg/m2         Blood Pressure from Last 3 Encounters:    01/02/18 120/78   06/08/17 140/80   12/21/16 126/70    Weight from Last 3 Encounters:   01/02/18 254 lb 12.8 oz (115.6 kg)   06/08/17 241 lb 6.4 oz (109.5 kg)   12/21/16 247 lb 9.6 oz (112.3 kg)              Today, you had the following     No orders found for display       Primary Care Provider Office Phone # Fax #    Yamilka Mirian Hill PA-C [phone]       [address] [address]        Equal Access to Services     [facility] HospitalSHELLEY : Hadii fina rice hadkobio Sograzyna, waaxda luqadaha, qaybta kaalmada adeemily, sue mayorga . So [facility] [phone].    ATENCIÓN: Si habla español, tiene a fowler disposición servicios gratuitos de asistencia lingüística. [facility] [phone].    We comply with applicable federal civil rights laws and [place] laws. We do not discriminate on the basis of race, color, national origin, age, disability, sex, sexual orientation, or gender identity.            Thank you!     Thank you for choosing [facility]  for your care. Our goal is always to provide you with excellent care. Hearing back from our patients is one way we can continue to improve our services. Please take a few minutes to complete the written survey that you may receive in the mail after your visit with us. Thank you!             Your Updated Medication List - Protect others around you: Learn how to safely use, store and throw away your medicines at www.disposemymeds.org.          This list is accurate as of: 1/2/18 12:16 PM.  Always use your most recent med list.                   Brand Name Dispense Instructions for use Diagnosis    ALEVE PO      Take 220 mg by mouth        ibuprofen 600 MG tablet    ADVIL/MOTRIN    30 tablet    Take 1 tablet (600 mg) by mouth every 6 hours as needed for pain (mild)    Status post hysteroscopy       metroNIDAZOLE 0.75 % topical gel    METROGEL     USE 1 APPLICATION QD        Multi-vitamin Tabs tablet   Generic drug:   multivitamin, therapeutic with minerals      1 tablet daily        nystatin 628173 UNIT/GM Powd    MYCOSTATIN    30 g    Apply topically 3 times daily as needed    Candidiasis of skin

## 2018-01-03 ASSESSMENT — ANXIETY QUESTIONNAIRES: GAD7 TOTAL SCORE: 9

## 2018-02-06 ENCOUNTER — OFFICE VISIT (OUTPATIENT)
Dept: FAMILY MEDICINE | Facility: CLINIC | Age: 48
End: 2018-02-06
Payer: COMMERCIAL

## 2018-02-06 VITALS
WEIGHT: 254.5 LBS | BODY MASS INDEX: 40.9 KG/M2 | TEMPERATURE: 98.3 F | SYSTOLIC BLOOD PRESSURE: 132 MMHG | HEART RATE: 76 BPM | DIASTOLIC BLOOD PRESSURE: 72 MMHG | HEIGHT: 66 IN

## 2018-02-06 DIAGNOSIS — R63.5 WEIGHT GAIN: ICD-10-CM

## 2018-02-06 DIAGNOSIS — Z00.00 ROUTINE GENERAL MEDICAL EXAMINATION AT A HEALTH CARE FACILITY: Primary | ICD-10-CM

## 2018-02-06 DIAGNOSIS — E66.01 MORBID OBESITY (H): ICD-10-CM

## 2018-02-06 DIAGNOSIS — R06.83 SNORING: ICD-10-CM

## 2018-02-06 PROCEDURE — 80061 LIPID PANEL: CPT | Performed by: PHYSICIAN ASSISTANT

## 2018-02-06 PROCEDURE — 36415 COLL VENOUS BLD VENIPUNCTURE: CPT | Performed by: PHYSICIAN ASSISTANT

## 2018-02-06 PROCEDURE — 82947 ASSAY GLUCOSE BLOOD QUANT: CPT | Performed by: PHYSICIAN ASSISTANT

## 2018-02-06 PROCEDURE — 84443 ASSAY THYROID STIM HORMONE: CPT | Performed by: PHYSICIAN ASSISTANT

## 2018-02-06 PROCEDURE — 84439 ASSAY OF FREE THYROXINE: CPT | Performed by: PHYSICIAN ASSISTANT

## 2018-02-06 PROCEDURE — 99396 PREV VISIT EST AGE 40-64: CPT | Performed by: PHYSICIAN ASSISTANT

## 2018-02-06 NOTE — PROGRESS NOTES
SUBJECTIVE:   CC: Janelle Byrd is an 47 year old woman who presents for preventive health visit.     Physical   Annual:     Getting at least 3 servings of Calcium per day::  Yes    Bi-annual eye exam::  Yes    Dental care twice a year::  Yes    Sleep apnea or symptoms of sleep apnea::  Sleep apnea    Diet::  Regular (no restrictions)    Frequency of exercise::  1 day/week    Duration of exercise::  15-30 minutes    Taking medications regularly::  Yes    Additional concerns today::  YES (Blood pressure at home was higher.  Discuss loosing weight.  Has 2 siblings recently diagnosed with diabetes so would like to be tested for this today.)            1. Requesting diabetes labs - 2 siblings with diabetes  2. Weight - struggling. Stress due to dad's health and not getting exercise. Did better on a paleo style diet and with more exercise - reports that in general she's ready to get back to that. Reports some overeating and eating poor foods at times.     Today's PHQ-2 Score:   PHQ-2 ( 1999 Pfizer) 2/5/2018   Q1: Little interest or pleasure in doing things 0   Q2: Feeling down, depressed or hopeless 0   PHQ-2 Score 0   Q1: Little interest or pleasure in doing things Not at all   Q2: Feeling down, depressed or hopeless Not at all   PHQ-2 Score 0       Abuse: Current or Past(Physical, Sexual or Emotional)- No  Do you feel safe in your environment - Yes    Social History   Substance Use Topics     Smoking status: Never Smoker     Smokeless tobacco: Never Used     Alcohol use Yes      Comment: rare     Alcohol Use 2/5/2018   If you drink alcohol, do you typically have greater than 3 drinks per day OR greater than 7 drinks per week?   No     Reviewed orders with patient.  Reviewed health maintenance and updated orders accordingly - Yes  Labs reviewed in EPIC    Patient under age 50, mutual decision reflected in health maintenance.      Pertinent mammograms are reviewed under the imaging tab.  History of  abnormal Pap smear: NO - age 30- 65 PAP every 3 years recommended    Reviewed and updated as needed this visit by clinical staff         Reviewed and updated as needed this visit by Provider            Review of Systems  C: NEGATIVE for fever, chills, change in weight  I: NEGATIVE for worrisome rashes, moles or lesions  E: NEGATIVE for vision changes or irritation  ENT: NEGATIVE for ear, mouth and throat problems  R: NEGATIVE for significant cough or SOB  B: NEGATIVE for masses, tenderness or discharge  CV: NEGATIVE for chest pain, palpitations or peripheral edema  GI: NEGATIVE for nausea, abdominal pain, heartburn, or change in bowel habits  : NEGATIVE for unusual urinary or vaginal symptoms. Periods are regular.  M: NEGATIVE for significant arthralgias or myalgia  N: NEGATIVE for weakness, dizziness or paresthesias  P: NEGATIVE for changes in mood or affect     OBJECTIVE:   There were no vitals taken for this visit.  Physical Exam  GENERAL: healthy, alert and no distress  EYES: Eyes grossly normal to inspection, PERRL and conjunctivae and sclerae normal  HENT: ear canals and TM's normal, nose and mouth without ulcers or lesions  NECK: no adenopathy, no asymmetry, masses, or scars and thyroid normal to palpation  RESP: lungs clear to auscultation - no rales, rhonchi or wheezes  CV: regular rate and rhythm, normal S1 S2, no S3 or S4, no murmur, click or rub, no peripheral edema and peripheral pulses strong  ABDOMEN: soft, nontender, no hepatosplenomegaly, no masses and bowel sounds normal  MS: no gross musculoskeletal defects noted, no edema  SKIN: no suspicious lesions or rashes  NEURO: Normal strength and tone, mentation intact and speech normal  PSYCH: mentation appears normal, affect normal/bright  LYMPH: no cervical, supraclavicular, axillary, or inguinal adenopathy    ASSESSMENT/PLAN:   (Z00.00) Routine general medical examination at a health care facility  (primary encounter diagnosis)  Comment: Well person  "  Plan: Glucose, Lipid panel reflex to direct LDL         Fasting        Diet, exercise, wellness and other preventive recommendations related to health maintenance were discussed.  Follow up as needed for acute issues.  Physical exam in 1 year.     (E66.01) Morbid obesity (H)  Comment:   Plan: TSH, T4, free        Reviewed diet, lifestyle, exercise and other methods of weight loss / weight management. Could see Dr. Partha Long for weight visit. Check labs today. Discussed various diets and options for weight.    (R63.5) Weight gain  Comment:   Plan: TSH, T4, free        As noted     (R06.83) Snoring  Comment:   Plan: Should consider a sleep eval. She will consider.     COUNSELING:  Reviewed preventive health counseling, as reflected in patient instructions         reports that she has never smoked. She has never used smokeless tobacco.    Estimated body mass index is 41.94 kg/(m^2) as calculated from the following:    Height as of 6/8/17: 5' 5.35\" (1.66 m).    Weight as of 1/2/18: 254 lb 12.8 oz (115.6 kg).       Counseling Resources:  ATP IV Guidelines  Pooled Cohorts Equation Calculator  Breast Cancer Risk Calculator  FRAX Risk Assessment  ICSI Preventive Guidelines  Dietary Guidelines for Americans, 2010  USDA's MyPlate  ASA Prophylaxis  Lung CA Screening    JOSE ALEJANDRO RUIZ PA-C  [facility]  Answers for HPI/ROS submitted by the patient on 2/5/2018   PHQ-2 Score: 0    "

## 2018-02-06 NOTE — MR AVS SNAPSHOT
After Visit Summary   2/6/2018    Janelle Byrd    MRN: [id number]           Patient Information     Date Of Birth          1970        Visit Information        Provider Department      2/6/2018 12:20 PM Celso Vasquez PA-C [facility]        Today's Diagnoses     Routine general medical examination at a health care facility    -  1    Morbid obesity (H)        Weight gain        Snoring          Care Instructions      Preventive Health Recommendations  Female Ages 40 to 49    Yearly exam:     See your health care provider every year in order to  1. Review health changes.   2. Discuss preventive care.    3. Review your medicines if your doctor prescribed any.      Get a Pap test every three years (unless you have an abnormal result and your provider advises testing more often).      If you get Pap tests with HPV test, you only need to test every 5 years, unless you have an abnormal result. You do not need a Pap test if your uterus was removed (hysterectomy) and you have not had cancer.      You should be tested each year for STDs (sexually transmitted diseases), if you're at risk.       Ask your doctor if you should have a mammogram.      Have a colonoscopy (test for colon cancer) if someone in your family has had colon cancer or polyps before age 50.       Have a cholesterol test every 5 years.       Have a diabetes test (fasting glucose) after age 45. If you are at risk for diabetes, you should have this test every 3 years.    Shots: Get a flu shot each year. Get a tetanus shot every 10 years.     Nutrition:     Eat at least 5 servings of fruits and vegetables each day.    Eat whole-grain bread, whole-wheat pasta and brown rice instead of white grains and rice.    Talk to your provider about Calcium and Vitamin D.     Lifestyle    Exercise at least 150 minutes a week (an average of 30 minutes a day, 5 days a week). This will help you control your weight  "and prevent disease.    Limit alcohol to one drink per day.    No smoking.     Wear sunscreen to prevent skin cancer.    See your dentist every six months for an exam and cleaning.          Follow-ups after your visit        Who to contact     If you have questions or need follow up information about today's clinic visit or your schedule please contact [facility] directly at [phone].  Normal or non-critical lab and imaging results will be communicated to you by Carmot Therapeuticshart, letter or phone within 4 business days after the clinic has received the results. If you do not hear from us within 7 days, please contact the clinic through [employer] or phone. If you have a critical or abnormal lab result, we will notify you by phone as soon as possible.  Submit refill requests through [employer] Dive or call your pharmacy and they will forward the refill request to us. Please allow 3 business days for your refill to be completed.          Additional Information About Your Visit        [employer]arm[employer] Therapeuticsharw[employer]ly Information     [employer] gives you secure access to your electronic health record. If you see a primary care provider, you can also send messages to your care team and make appointments. If you have questions, please call your primary care clinic.  If you do not have a primary care provider, please call [phone] and they will assist you.        Care EveryWhere ID     This is your Care EveryWhere ID. This could be used by other organizations to access your [place] medical records  [id number]        Your Vitals Were     Pulse Temperature Height BMI (Body Mass Index)          76 98.3  F (36.8  C) (Oral) 5' 5.5\" (1.664 m) 41.71 kg/m2         Blood Pressure from Last 3 Encounters:   02/06/18 132/72   01/02/18 120/78   06/08/17 140/80    Weight from Last 3 Encounters:   02/06/18 254 lb 8 oz (115.4 kg)   01/02/18 254 lb 12.8 oz (115.6 kg)   06/08/17 241 lb 6.4 oz (109.5 kg)              We Performed the Following     Glucose     " Lipid panel reflex to direct LDL Fasting     T4, free     TSH        Primary Care Provider Office Phone # Fax #    Yamilka Hill PA-C [phone]       [address] [facility] [address]        Equal Access to Services     YVONNE NICHOLS : Hadii fina rice hadkobio Soomaali, waaxda luqadaha, qaybta kaalmada adeegyada, waxloni mechein haypennyn cherise yareligerald stratton. So [facility] [phone].    ATENCIÓN: Si habla español, tiene a fowler disposición servicios gratuitos de asistencia lingüística. Llame al [phone].    We comply with applicable federal civil rights laws and [place] laws. We do not discriminate on the basis of race, color, national origin, age, disability, sex, sexual orientation, or gender identity.            Thank you!     Thank you for choosing [facility]  for your care. Our goal is always to provide you with excellent care. Hearing back from our patients is one way we can continue to improve our services. Please take a few minutes to complete the written survey that you may receive in the mail after your visit with us. Thank you!             Your Updated Medication List - Protect others around you: Learn how to safely use, store and throw away your medicines at www.disposemymeds.org.          This list is accurate as of 2/6/18  1:23 PM.  Always use your most recent med list.                   Brand Name Dispense Instructions for use Diagnosis    ALEVE PO      Take 220 mg by mouth        metroNIDAZOLE 0.75 % topical gel    METROGEL     USE 1 APPLICATION QD        Multi-vitamin Tabs tablet   Generic drug:  multivitamin, therapeutic with minerals      1 tablet daily

## 2018-02-07 LAB
CHOLEST SERPL-MCNC: 209 MG/DL
GLUCOSE SERPL-MCNC: 87 MG/DL (ref 70–99)
HDLC SERPL-MCNC: 51 MG/DL
LDLC SERPL CALC-MCNC: 119 MG/DL
NONHDLC SERPL-MCNC: 158 MG/DL
T4 FREE SERPL-MCNC: 1.01 NG/DL (ref 0.76–1.46)
TRIGL SERPL-MCNC: 193 MG/DL
TSH SERPL DL<=0.005 MIU/L-ACNC: 1.25 MU/L (ref 0.4–4)

## 2018-04-27 DIAGNOSIS — Z12.31 VISIT FOR SCREENING MAMMOGRAM: ICD-10-CM

## 2018-04-27 PROCEDURE — 77067 SCR MAMMO BI INCL CAD: CPT | Mod: TC

## 2018-10-15 ENCOUNTER — OFFICE VISIT (OUTPATIENT)
Dept: SLEEP MEDICINE | Facility: CLINIC | Age: 48
End: 2018-10-15
Payer: COMMERCIAL

## 2018-10-15 VITALS
BODY MASS INDEX: 42.4 KG/M2 | OXYGEN SATURATION: 93 % | SYSTOLIC BLOOD PRESSURE: 132 MMHG | HEIGHT: 66 IN | WEIGHT: 263.8 LBS | DIASTOLIC BLOOD PRESSURE: 85 MMHG | HEART RATE: 86 BPM

## 2018-10-15 DIAGNOSIS — E66.01 CLASS 3 SEVERE OBESITY DUE TO EXCESS CALORIES WITHOUT SERIOUS COMORBIDITY WITH BODY MASS INDEX (BMI) OF 40.0 TO 44.9 IN ADULT (H): Chronic | ICD-10-CM

## 2018-10-15 DIAGNOSIS — G47.10 ORGANIC HYPERSOMNIA: ICD-10-CM

## 2018-10-15 DIAGNOSIS — R06.89 DYSPNEA AND RESPIRATORY ABNORMALITY: Primary | ICD-10-CM

## 2018-10-15 DIAGNOSIS — E66.813 CLASS 3 SEVERE OBESITY DUE TO EXCESS CALORIES WITHOUT SERIOUS COMORBIDITY WITH BODY MASS INDEX (BMI) OF 40.0 TO 44.9 IN ADULT (H): Chronic | ICD-10-CM

## 2018-10-15 DIAGNOSIS — G47.9 DISTURBANCE IN SLEEP BEHAVIOR: ICD-10-CM

## 2018-10-15 DIAGNOSIS — R06.00 DYSPNEA AND RESPIRATORY ABNORMALITY: Primary | ICD-10-CM

## 2018-10-15 PROCEDURE — 99204 OFFICE O/P NEW MOD 45 MIN: CPT | Performed by: INTERNAL MEDICINE

## 2018-10-15 NOTE — PATIENT INSTRUCTIONS

## 2018-10-15 NOTE — PROGRESS NOTES
Sleep Consultation:    Date on this visit: 10/15/2018      Primary Physician: Yamilka Hill     Chief Complaint   Patient presents with     Sleep Problem     sleep apnea and insomnia        Janelle goes to bed at 11:30 PM during the week. She wakes up at 6:30 AM without an alarm. She falls asleep in 20 minutes.  Janelle denies difficulty falling asleep.  She wakes up about 5 times a night, she has difficulty falling back to sleep. She is unsure why, she denies an overactive mind. Janelle wakes up to uncertain reasons.  On weekends, Janelle goes to sleep at 11:30 PM.  She wakes up at 8:00 AM without an alarm.     Patient does use electronics in bed.     Janelle does snore snoring is very loud. Patient does have a regular bed partner.  She does have witnessed apneas. They frequently sleep separately.  Patient sleeps on her side. She denies frequent morning headaches (1/week) and has no restless legs.     Janelle denies any sleep walking, sleep talking, dream enactment, cataplexy and hypnogogic/hypnopompic hallucinations.    She had some sleep paralysis after her pregnancy, has occurred about 5 times.     Janelle denies heartburn at night.      Patient describes themself as neither a morning or night person.  Patient's [place] Sleepiness score 9/24.    Janelle naps 5 times per week for 10-20 minutes. She takes some inadvertant naps.  She admits struggling to stay awake when driving. Patient was counseled on the importance of driving while alert, to pull over if drowsy, or nap before getting into the vehicle if sleepy.  She uses 2 cups/day of coffee.       Recent Labs   Lab Test  02/06/18   1327  03/16/16   1217  01/25/16   1452   NA   --   140  138   POTASSIUM   --   4.2  3.7   CHLORIDE   --   108  107   CO2   --   27  28   ANIONGAP   --   5  3   GLC  87  81  101*   BUN   --   11  15   CR   --   0.78  0.77   ANNALISA   --   8.7  8.1*         Allergies:    Allergies   Allergen Reactions     Depo-Medrol [Methylprednisolone] Other  (See Comments)     Oral thrush after receiving an intra-articular injection, reports that she has family members that have also had this symptom following intra-articular injections.     Dust Mites      Sneezing     Mold Cough     Tape [Adhesive Tape] Rash       Medications:    Current Outpatient Prescriptions   Medication Sig Dispense Refill     IBUPROFEN PO        metroNIDAZOLE (METROGEL) 0.75 % gel USE 1 APPLICATION QD  3     MULTI-VITAMIN OR TABS 1 tablet daily  0     Naproxen Sodium (ALEVE PO) Take 220 mg by mouth         Problem List:  Patient Active Problem List    Diagnosis Date Noted     Anxiety 2016     Priority: Medium     Obesity 2015     Priority: Medium     Arthritis      Priority: Low     Hands, feet, and knees       GERD (gastroesophageal reflux disease)      Priority: Low     Abnormal gait 2016     Priority: Low     Primary osteoarthritis of both knees 10/25/2016     Priority: Low     Hip pain 2014     Priority: Low     CARDIOVASCULAR SCREENING; LDL GOAL LESS THAN 160 2010     Priority: Low        Past Medical/Surgical History:  Past Medical History:   Diagnosis Date     Complex tear of medial meniscus of right knee as current injury, initial encounter 10/25/2016     Low back pain 2014     Nephrolithiasis 2000     S/P meniscectomy 2016     Skin cancer 2001    Basal cell carcinoma on nose     TRANS HYPERTEN-ANTEPART 5/10/2006     Umbilical discharge 2014     Past Surgical History:   Procedure Laterality Date     ARTHROSCOPY SHOULDER  10/2004    LT/to remove calcium deposits     ARTHROSCOPY SHOULDER DECOMPRESSION Left 2014    Procedure: ARTHROSCOPY SHOULDER DECOMPRESSION;  Surgeon: Julien Maciel MD;  Location: [facility] OR     BIOPSY  2001    skin cancer from nose      SECTION  2006    , Low Transverse      SECTION  2008     COLONOSCOPY  2016    Normal     DILATION AND CURETTAGE, OPERATIVE HYSTEROSCOPY WITH  MORCELLATOR, COMBINED N/A 6/2/2016    Procedure: COMBINED DILATION AND CURETTAGE, OPERATIVE HYSTEROSCOPY WITH MORCELLATOR;  Surgeon: Idalia Mcarthur MD;  Location: [facility]       Social History:  Social History     Social History     Marital status:      Spouse name: Calvin     Number of children: 3     Years of education: N/A     Occupational History     student  [employer]     writing instructor     Social History Main Topics     Smoking status: Never Smoker     Smokeless tobacco: Never Used     Alcohol use Yes      Comment: rare, 2/month     Drug use: No     Sexual activity: Yes     Partners: Male     Birth control/ protection: Post-menopausal, Natural Family Planning, Female Surgical      Comment: Tubal ligation 11/08/2008.     Other Topics Concern     Parent/Sibling W/ Cabg, Mi Or Angioplasty Before 65f 55m? Yes     Father     Social History Narrative    Caffeine intake/servings daily - 1    Calcium intake/servings daily - 2-3    Exercise 2 times weekly - describe walking, treadmill, running    Sunscreen used - Yes    Seatbelts used - Yes    Guns stored in the home - No    Self Breast Exam - Yes    Pap test up to date -  Yes, as of today, 6/7/2010 Normal    Eye exam up to date -  Yes    Dental exam up to date -  Yes    DEXA scan up to date -  Not Applicable    Flex Sig/Colonoscopy up to date -  Not Applicable    Mammography up to date -  Not Applicable    Immunizations reviewed and up to date - Yes, tetanus 3/9/2009    Abuse: Current or Past (Physical, Sexual or Emotional) - No    Do you feel safe in your environment - Yes    Do you cope well with stress - Yes    Do you suffer from insomnia - Yes, staying asleep    Last updated by: Amalia Perez CMA 8/29/2011                                       Family History:  Family History   Problem Relation Age of Onset     Lipids Mother      Arthritis Mother      Psychotic Disorder Mother      Respiratory Mother      copd     Circulatory Mother       BLOOD CLOTS     Genitourinary Problems Mother      Heart Failure Mother      Depression Mother      Mental Illness Mother      Bi-polar     Obesity Mother      Lipids Father      Alcohol/Drug Father      Arthritis Father      C.A.D. Father      Cancer - colorectal Father      Hypertension Father      Cerebrovascular Disease Father      10/2018     HEART DISEASE Maternal Grandmother      Breast Cancer Maternal Grandmother      Cerebrovascular Disease Maternal Grandmother      Alcohol/Drug Maternal Grandfather      Cerebrovascular Disease Maternal Grandfather      Alcohol/Drug Paternal Grandmother      Hypertension Paternal Grandmother      C.A.D. Paternal Grandfather      GASTROINTESTINAL DISEASE Paternal Grandfather      liver diease     Alcohol/Drug Paternal Grandfather      Cerebrovascular Disease Paternal Grandfather      Circulatory Sister      BLOOD CLOTS     Diabetes Brother      Diabetes Sister        Review of Systems:  A complete review of systems reviewed by me is negative with the exeption of what has been mentioned in the history of present illness.  CONSTITUTIONAL: NEGATIVE for weight gain/loss, fever, chills, sweats or night sweats, drug allergies.  EYES: NEGATIVE for changes in vision, blind spots, double vision.  ENT:  POSITIVE for  sore throat  CARDIAC: NEGATIVE for fast heartbeats or fluttering in chest, chest pain or pressure, breathlessness when lying flat, swollen legs or swollen feet.  NEUROLOGIC:  POSITIVE for  headaches  DERMATOLOGIC: NEGATIVE for rashes, new moles or change in mole(s)  PULMONARY:  POSITIVE for  SOB with activity, dry cough and productive cough  GASTROINTESTINAL: NEGATIVE for nausea or vomitting, loose or watery stools, fat or grease in stools, constipation, abdominal pain, bowel movements black in color or blood noted.  GENITOURINARY: NEGATIVE for pain during urination, blood in urine, urinating more frequently than usual, irregular menstrual periods.  MUSCULOSKELETAL:   "POSITIVE for  muscle pain and bone or joint pain  ENDOCRINE: NEGATIVE for increased thirst or urination, diabetes.  LYMPHATIC: NEGATIVE for swollen lymph nodes, lumps or bumps in the breasts or nipple discharge.    Physical Examination:  Vitals: /85  Pulse 86  Ht 1.676 m (5' 6\")  Wt 119.7 kg (263 lb 12.8 oz)  SpO2 93%  BMI 42.58 kg/m2  BMI= Body mass index is 42.58 kg/(m^2).         [place] Total Score 10/15/2018   Total score - [place] 9       SANAM Total Score: 20 (10/15/18 1100)    GENERAL APPEARANCE: alert and no distress  EYES: Eyes grossly normal to inspection and conjunctivae and sclerae normal  HENT: ear canals and TM's normal, nose and mouth without ulcers or lesions, oropharynx crowded and tonsillar hypertrophy  NECK: no adenopathy, no asymmetry, masses, or scars and thyroid normal to palpation  RESP: lungs clear to auscultation - no rales, rhonchi or wheezes  CV: regular rates and rhythm, normal S1 S2, no S3 or S4 and no murmur, click or rub  ABDOMEN: soft, nontender, without hepatosplenomegaly or masses and bowel sounds normal  MS: extremities normal- no gross deformities noted  NEURO: Normal strength and tone, mentation intact, speech normal and cranial nerves 2-12 intact  PSYCH: mentation appears normal and affect normal/bright  Mallampati Class: III.  Tonsillar Stage: 3  extending beyond pillars.    Impression/Plan:    Loud socially disruptive snoring, witnessed apneas, frequent awakenings/sleep maintenance difficulties, excessive daytime sleepiness (ESS 9), obesity, promoinant tonsils. Patient appears to be a good candidate for Home Sleep Test STOPBAN  4    Literature provided regarding sleep apnea.      She will follow up with me in approximately two weeks after her sleep study has been competed to review the results and discuss plan of care.       Polysomnography reviewed.  Obstructive sleep apnea reviewed.  Complications of untreated sleep apnea were reviewed.    Surinder Simmons     CC: " Yamilka Hill

## 2018-10-15 NOTE — NURSING NOTE
"Chief Complaint   Patient presents with     Sleep Problem     sleep apnea and insomnia        Initial There were no vitals taken for this visit. Estimated body mass index is 41.71 kg/(m^2) as calculated from the following:    Height as of 2/6/18: 1.664 m (5' 5.5\").    Weight as of 2/6/18: 115.4 kg (254 lb 8 oz).    Medication Reconciliation: complete    Neck circumference: 15 inches / 48 centimeters.      "

## 2018-10-22 ENCOUNTER — OFFICE VISIT (OUTPATIENT)
Dept: SLEEP MEDICINE | Facility: CLINIC | Age: 48
End: 2018-10-22
Payer: COMMERCIAL

## 2018-10-22 DIAGNOSIS — R06.00 DYSPNEA AND RESPIRATORY ABNORMALITY: ICD-10-CM

## 2018-10-22 DIAGNOSIS — G47.10 ORGANIC HYPERSOMNIA: ICD-10-CM

## 2018-10-22 DIAGNOSIS — G47.9 DISTURBANCE IN SLEEP BEHAVIOR: ICD-10-CM

## 2018-10-22 DIAGNOSIS — E66.813 CLASS 3 SEVERE OBESITY DUE TO EXCESS CALORIES WITHOUT SERIOUS COMORBIDITY WITH BODY MASS INDEX (BMI) OF 40.0 TO 44.9 IN ADULT (H): Chronic | ICD-10-CM

## 2018-10-22 DIAGNOSIS — E66.01 CLASS 3 SEVERE OBESITY DUE TO EXCESS CALORIES WITHOUT SERIOUS COMORBIDITY WITH BODY MASS INDEX (BMI) OF 40.0 TO 44.9 IN ADULT (H): Chronic | ICD-10-CM

## 2018-10-22 DIAGNOSIS — R06.89 DYSPNEA AND RESPIRATORY ABNORMALITY: ICD-10-CM

## 2018-10-22 PROCEDURE — G0399 HOME SLEEP TEST/TYPE 3 PORTA: HCPCS | Performed by: INTERNAL MEDICINE

## 2018-10-23 ENCOUNTER — APPOINTMENT (OUTPATIENT)
Dept: SLEEP MEDICINE | Facility: CLINIC | Age: 48
End: 2018-10-23
Payer: COMMERCIAL

## 2018-10-23 NOTE — PROGRESS NOTES
Pt returned HST device. It was downloaded and forwarded data to the clinical specialist for scoring.    Ariella Mares MA on 10/23/2018 at 9:27 AM

## 2018-10-23 NOTE — PROGRESS NOTES
This HSAT was performed using a Noxturnal T3 device which recorded snore, sound, movement activity, body position, nasal pressure, oronasal thermal airflow, pulse, oximetry and both chest and abdominal respiratory effort. HSAT data was restricted to the time patient states they were in bed.     HSAT was scored using 1B 4% hypopnea rule.     HST AHI (Non-PAT): 29.4  Snoring was reported as moderate.  Time with SpO2 below 89% was 37 minutes.   Overall signal quality was good     Pt will follow up with sleep provider to determine appropriate therapy.

## 2018-10-24 NOTE — PROCEDURES
"HOME SLEEP STUDY INTERPRETATION    Patient: Janelle Byrd  MRN: [id number]  YOB: 1970  Study Date: 10/22/2018  Referring Provider: Yamilka Hill  Ordering Provider: Surinder Simmons MD     Indications for Home Study: Janelle Byrd is a 48 year old female with symptoms suggestive of obstructive sleep apnea.    Estimated body mass index is 42.58 kg/(m^2) as calculated from the following:    Height as of 10/15/18: 1.676 m (5' 6\").    Weight as of 10/15/18: 119.7 kg (263 lb 12.8 oz).  Total score - [place]: 9 (10/15/2018 11:00 AM)  StopBang Total Score: 4 (10/15/2018 11:00 AM)    Data: A full night home sleep study was performed recording the standard physiologic parameters including body position, movement, sound, nasal pressure, thermal oral airflow, chest and abdominal movements with respiratory inductance plethysmography, and oxygen saturation by pulse oximetry. Pulse rate was estimated by oximetry recording. This study was considered adequate based on > 4 hours of quality oximetry and respiratory recording. As specified by the AASM Manual for the Scoring of Sleep and Associated events, version 2.3, Rule VIII.D 1B, 4% oxygen desaturation scoring for hypopneas is used as a standard of care on all home sleep apnea testing.    Analysis Time:  420 minutes    Respiration:   Sleep Associated Hypoxemia: episodic hypoxemia was present. Baseline oxygen saturation was 93%.  Time with saturation less than or equal to 88% was 37 minutes. The lowest oxygen saturation was 77%.   Snoring: Snoring was present.  Respiratory events: The home study revealed a presence of 61 obstructive apneas and 3 mixed and central apneas. There were 142 hypopneas resulting in a combined apnea/hypopnea index [AHI] of 29.4 events per hour.  AHI was 51.3 per hour supine, n/a per hour prone, 27.4 per hour on left side, and 2.8 per hour on right side.   Pattern: Excluding events noted above, respiratory " rate and pattern was Normal.    Position: Percent of time spent: supine - 28%, prone - 0%, on left - 53%, on right - 15%.    Heart Rate: By pulse oximetry normal rate was noted.     Assessment:   Severe obstructive sleep apnea.  Sleep associated hypoxemia was present.    Recommendations:  Consider auto-CPAP at 5-18 cmH2O, oral appliance therapy or polysomnography with full night PAP titration.  Suggest optimizing sleep hygiene and avoiding sleep deprivation.  Weight management.    Diagnosis Code(s): Obstructive Sleep Apnea G47.33, Hypoxemia G47.36    Surinder Simmons MD, October 24, 2018   Diplomate, American Board of Internal Medicine, Sleep Medicine

## 2018-10-31 ENCOUNTER — OFFICE VISIT (OUTPATIENT)
Dept: SLEEP MEDICINE | Facility: CLINIC | Age: 48
End: 2018-10-31
Payer: COMMERCIAL

## 2018-10-31 VITALS
OXYGEN SATURATION: 95 % | HEART RATE: 86 BPM | HEIGHT: 66 IN | WEIGHT: 263 LBS | DIASTOLIC BLOOD PRESSURE: 88 MMHG | BODY MASS INDEX: 42.27 KG/M2 | SYSTOLIC BLOOD PRESSURE: 136 MMHG

## 2018-10-31 DIAGNOSIS — G47.33 OSA (OBSTRUCTIVE SLEEP APNEA): Primary | ICD-10-CM

## 2018-10-31 PROCEDURE — 99214 OFFICE O/P EST MOD 30 MIN: CPT | Performed by: INTERNAL MEDICINE

## 2018-10-31 NOTE — NURSING NOTE
"Chief Complaint   Patient presents with     Sleep Study       Initial There were no vitals taken for this visit. Estimated body mass index is 42.58 kg/(m^2) as calculated from the following:    Height as of 10/15/18: 1.676 m (5' 6\").    Weight as of 10/15/18: 119.7 kg (263 lb 12.8 oz).    Medication Reconciliation: complete      "

## 2018-10-31 NOTE — MR AVS SNAPSHOT
After Visit Summary   10/31/2018    Janelle Byrd    MRN: [id number]           Patient Information     Date Of Birth          1970        Visit Information        Provider Department      10/31/2018 11:40 AM Surinder Simmons MD [facility]        Today's Diagnoses     SHONDA (obstructive sleep apnea)    -  1      Care Instructions      Your BMI is Body mass index is 42.45 kg/(m^2).  Weight management is a personal decision.  If you are interested in exploring weight loss strategies, the following discussion covers the approaches that may be successful. Body mass index (BMI) is one way to tell whether you are at a healthy weight, overweight, or obese. It measures your weight in relation to your height.  A BMI of 18.5 to 24.9 is in the healthy range. A person with a BMI of 25 to 29.9 is considered overweight, and someone with a BMI of 30 or greater is considered obese. More than two-thirds of American adults are considered overweight or obese.  Being overweight or obese increases the risk for further weight gain. Excess weight may lead to heart disease and diabetes.  Creating and following plans for healthy eating and physical activity may help you improve your health.  Weight control is part of healthy lifestyle and includes exercise, emotional health, and healthy eating habits. Careful eating habits lifelong are the mainstay of weight control. Though there are significant health benefits from weight loss, long-term weight loss with diet alone may be very difficult to achieve- studies show long-term success with dietary management in less than 10% of people. Attaining a healthy weight may be especially difficult to achieve in those with severe obesity. In some cases, medications, devices and surgical management might be considered.  What can you do?  If you are overweight or obese and are interested in methods for weight loss, you should discuss this with your provider.      Consider reducing daily calorie intake by 500 calories.     Keep a food journal.     Avoiding skipping meals, consider cutting portions instead.    Diet combined with exercise helps maintain muscle while optimizing fat loss. Strength training is particularly important for building and maintaining muscle mass. Exercise helps reduce stress, increase energy, and improves fitness. Increasing exercise without diet control, however, may not burn enough calories to loose weight.       Start walking three days a week 10-20 minutes at a time    Work towards walking thirty minutes five days a week     Eventually, increase the speed of your walking for 1-2 minutes at time    In addition, we recommend that you review healthy lifestyles and methods for weight loss available through the National Institutes of Health patient information sites:  http://win.niddk.nih.gov/publications/index.htm    And look into health and wellness programs that may be available through your health insurance provider, employer, local community center, or jonatan club.    Weight management plan: Patient was referred to their PCP to discuss a diet and exercise plan.              Follow-ups after your visit        Follow-up notes from your care team     Return in about 2 months (around 12/31/2018) for Routine Visit.      Who to contact     If you have questions or need follow up information about today's clinic visit or your schedule please contact [facility] directly at [phone].  Normal or non-critical lab and imaging results will be communicated to you by MyChart, letter or phone within 4 business days after the clinic has received the results. If you do not hear from us within 7 days, please contact the clinic through MyChart or phone. If you have a critical or abnormal lab result, we will notify you by phone as soon as possible.  Submit refill requests through [facility] or call your pharmacy and they will forward the refill  "request to us. Please allow 3 business days for your refill to be completed.          Additional Information About Your Visit        [facility] Information     [facility] gives you secure access to your electronic health record. If you see a primary care provider, you can also send messages to your care team and make appointments. If you have questions, please call your primary care clinic.  If you do not have a primary care provider, please call [phone] and they will assist you.        Care EveryWhere ID     This is your Care EveryWhere ID. This could be used by other organizations to access your [facility] medical records  [id number]        Your Vitals Were     Pulse Height Pulse Oximetry BMI (Body Mass Index)          86 1.676 m (5' 6\") 95% 42.45 kg/m2         Blood Pressure from Last 3 Encounters:   10/31/18 136/88   10/15/18 132/85   02/06/18 132/72    Weight from Last 3 Encounters:   10/31/18 119.3 kg (263 lb)   10/15/18 119.7 kg (263 lb 12.8 oz)   02/06/18 115.4 kg (254 lb 8 oz)              We Performed the Following     Comprehensive DME          Today's Medication Changes          These changes are accurate as of 10/31/18 12:20 PM.  If you have any questions, ask your nurse or doctor.               Start taking these medicines.        Dose/Directions    order for DME   Started by:  Surinder Simmons MD        autoCPAP 5-18 cmH20   Quantity:  1 Device   Refills:  0            Where to get your medicines      Information about where to get these medications is not yet available     ! Ask your nurse or doctor about these medications     order for DME                Primary Care Provider Office Phone # Fax #    Yamilka Hill PA-C [phone] [address] [facility] [address]        Equal Access to Services     YVONNE NICHOLS : Elio Salinas, ann martinez, sue wall. So [facility] [phone].    ATENCIÓN: Si " marlin miles, tiene a fowler disposición servicios gratuitos de asistencia lingüística. Bat[place] granado [phone].    We comply with applicable federal civil rights laws and [place] laws. We do not discriminate on the basis of race, color, national origin, age, disability, sex, sexual orientation, or gender identity.            Thank you!     Thank you for choosing [facility]  for your care. Our goal is always to provide you with excellent care. Hearing back from our patients is one way we can continue to improve our services. Please take a few minutes to complete the written survey that you may receive in the mail after your visit with us. Thank you!             Your Updated Medication List - Protect others around you: Learn how to safely use, store and throw away your medicines at www.disposemymeds.org.          This list is accurate as of 10/31/18 12:20 PM.  Always use your most recent med list.                   Brand Name Dispense Instructions for use Diagnosis    ALEVE PO      Take 220 mg by mouth        IBUPROFEN PO           metroNIDAZOLE 0.75 % topical gel    METROGEL     USE 1 APPLICATION QD        Multi-vitamin Tabs tablet   Generic drug:  multivitamin, therapeutic with minerals      1 tablet daily        order for DME     1 Device    autoCPAP 5-18 cmH20

## 2018-10-31 NOTE — PROGRESS NOTES
"Home Sleep Apnea Testing Results Visit:    Chief Complaint   Patient presents with     Sleep Study       Janelle Byrd is a 48 year old female who returns to [facility] after having had Home Sleep Apnea Testing.  She presented with loud socially disruptive snoring, witnessed apneas, frequent awakenings/sleep maintenance difficulties, excessive daytime sleepiness (ESS 9), obesity, promoinant tonsils.    Estimated body mass index is 42.45 kg/(m^2) as calculated from the following:    Height as of this encounter: 1.676 m (5' 6\").    Weight as of this encounter: 119.3 kg (263 lb).  Total score - [place]: 7 (10/31/2018 11:00 AM)   [place] Total Score: 4 (10/15/2018 11:00 AM)  SANAM Total Score: 17    Home Sleep Apnea Testing - 10/22/18: 263 lbs 0 oz: AHI 29.4/hr. Supine AHI 51.3/hr.   Oxygen Terry of 77%.  Baseline 93%.  Sp02 =< 88% for 37 minutes  She slept on her back (28%), prone (0%), left (53) and right (15%) sides.   Analysis time: 420 minutes.     Signal quality of Oxymeter 100% Good  Nasal Cannula 100% Good  RIP belts 100% Good.     Janelle Byrd reports that she slept Fair .     Home Sleep Apnea Testing was reviewed in detail today with Janelle and a copy given to her for her records.    Past medical/surgical history, family history, social history, medications and allergies were reviewed.      Ht 1.676 m (5' 6\")  Wt 119.3 kg (263 lb)  BMI 42.45 kg/m2    Impression/Plan:  Moderate-severe Obstructive Sleep Apnea.   Sleep associated hypoxemia was present.     Treatment options discussed today including  auto-CPAP at 5-18 cmH2O, oral appliance therapy, positional therapy, polysomnography with full night PAP titration or surgical options.    Elected treatment with auto-CPAP at 5-18 cmH2O.    24 minutes spent with patient with >50% spent in counseling and coordination of care.            "

## 2018-10-31 NOTE — PATIENT INSTRUCTIONS

## 2018-11-05 ENCOUNTER — TELEPHONE (OUTPATIENT)
Dept: SLEEP MEDICINE | Facility: CLINIC | Age: 48
End: 2018-11-05

## 2018-11-05 DIAGNOSIS — G47.33 OSA (OBSTRUCTIVE SLEEP APNEA): ICD-10-CM

## 2018-11-07 ENCOUNTER — DOCUMENTATION ONLY (OUTPATIENT)
Dept: SLEEP MEDICINE | Facility: CLINIC | Age: 48
End: 2018-11-07

## 2018-11-07 DIAGNOSIS — G47.33 OSA (OBSTRUCTIVE SLEEP APNEA): ICD-10-CM

## 2018-11-07 DIAGNOSIS — G47.33 OSA (OBSTRUCTIVE SLEEP APNEA): Primary | ICD-10-CM

## 2018-11-07 NOTE — PROGRESS NOTES
Patient was offered choice of vendor and chose [facility].  Patient Janelle Byrd was set up at [facility] on November 7, 2018. Patient received a Resmed AirSense 10 Auto. Pressures were set at 5-18 cm H2O.   Patient s ramp is 5 cm H2O for Auto and FLEX/EPR is 2.  Patient received a Resmed Mask name: F20 FOR HER  Full Face mask Size Medium, heated tubing and heated humidifier.  Patient is enrolled in the [facility] Program and does need to meet compliance. Patient has a follow up on 1/16/19 with Dr. Simmons.    Camille Shannen   1ST MASK CHOICE: F20 MED FOR HER   2ND MASK CHOICE: F20 SMALL FOR HER  3RD MASK CHOICE: N20 SMALL FOR HER   *PT WAS GIVEN THE OPTION TO TRY OTHER MASKS BUT DECLINED, PT IS AWARE OF 30 DAY MASK EXCHANGE*     11/7/18: PT WANTED TO BE IN NASAL MASK, HOWEVER WAS VERY CONCERNED ABOUT MOUTH BREATHING OPTED FOR A FF MASK, WILL SWAP TO NASAL IF NEEDED IN FUTURE.

## 2018-11-12 ENCOUNTER — DOCUMENTATION ONLY (OUTPATIENT)
Dept: SLEEP MEDICINE | Facility: CLINIC | Age: 48
End: 2018-11-12

## 2018-11-12 DIAGNOSIS — G47.33 OSA (OBSTRUCTIVE SLEEP APNEA): ICD-10-CM

## 2018-11-12 NOTE — PROGRESS NOTES
3 DAY STM VISIT    Diagnostic AHI:  29.4 HST    Patient contacted for 3 day STM visit  Message left for patient to return call     Device type: Auto-CPAP  PAP settings from order::  CPAP min 5 cm  H20       CPAP max 18 cm  H20  Mask type:    Full Face Mask     Assessment: No usage reporting but has a profile in [employer].  Action plan: Pt to have f/u 14 day STM visit.  Patient has a follow up visit scheduled:   yes within 31-90 days of set up.

## 2018-11-15 NOTE — PROGRESS NOTES
Patient returned call.     Subjective measures:  Pt was fit with the new F30 FFM and so far she thinks it's going ok. She used it last night and she had some trouble with it leaking by her nose but she thinks she just needs to work on how to adjust it when she's on her side.Patient meeting subjective benchmarks.     Action plan:pt to have 14 day [facility] visit.

## 2018-11-26 ENCOUNTER — DOCUMENTATION ONLY (OUTPATIENT)
Dept: SLEEP MEDICINE | Facility: CLINIC | Age: 48
End: 2018-11-26

## 2018-11-26 DIAGNOSIS — G47.33 OSA (OBSTRUCTIVE SLEEP APNEA): ICD-10-CM

## 2018-11-26 NOTE — PROGRESS NOTES
14 DAY STM VISIT    Diagnostic AHI:  29.4 HST    Subjective measures:   Pt hasn't been using it because she's been sick since Thursday. She's going to start using it again tonight.     Assessment: Pt not meeting objective benchmarks for compliance  Patient meeting subjective benchmarks.   Action plan: pt to have 30 day STM visit.    Device type: Auto-CPAP  PAP settings: CPAP min 5.0 cm  H20     CPAP max 18.0 cm  H20    95th% pressure 10.8 cm   Mask type:  Full Face Mask  Objective measures: 14 day rolling measures      Compliance  35 %      Leak  9.6 lpm  last  upload      AHI 3.45   last  upload      Average number of minutes 107     Average hours of usage 1.8          Objective measure goal  Compliance   Goal >70%  Leak   Goal < 24 lpm  AHI  Goal < 5  Usage  Goal >240

## 2018-12-11 ENCOUNTER — DOCUMENTATION ONLY (OUTPATIENT)
Dept: SLEEP MEDICINE | Facility: CLINIC | Age: 48
End: 2018-12-11

## 2018-12-11 DIAGNOSIS — G47.33 OSA (OBSTRUCTIVE SLEEP APNEA): ICD-10-CM

## 2018-12-11 NOTE — PROGRESS NOTES
30 DAY STM VISIT    Diagnostic AHI:    HST AHI (Non-PAT): 29.4        Subjective measures: Pt had and ear and sinus infection and has not been able to use due to pressure in her ears.  She feels that she is at the end of the course of antibiotics and she will try using tonight.  Her other complaint was waking to too much pressure.     Assessment: Pt not meeting objective benchmarks for compliance  Patient failing following subjective benchmarks: pressure issues and congestion.   Action plan: 2 week STM recheck appt scheduled.  Changed device to soft response for comfort. Patient has scheduled a follow up visit with Dr. Simmons on 1/16/2019.   Device type:   PAP Device: Auto-CPAP   PAP settings: CPAP min  5.0 H20      CPAP max 18.0cm  H20    CPAP fixed       95th% pressure  11.2  cm  H20   Mask type:    Mask Interface: Full Face Mask     Objective measures: 14 day rolling measures      Compliance  0 %      Leak  : 6.8  lpm  last  upload      AHI  1.2   last  upload      Average number of minutes : 2         Objective measure goal  Compliance   Goal >70%  Leak   Goal < 24 lpm  AHI  Goal < 5  Usage  Goal >240

## 2019-01-16 ENCOUNTER — OFFICE VISIT (OUTPATIENT)
Dept: SLEEP MEDICINE | Facility: CLINIC | Age: 49
End: 2019-01-16
Payer: COMMERCIAL

## 2019-01-16 VITALS
DIASTOLIC BLOOD PRESSURE: 96 MMHG | HEART RATE: 87 BPM | BODY MASS INDEX: 42.59 KG/M2 | WEIGHT: 265 LBS | OXYGEN SATURATION: 94 % | HEIGHT: 66 IN | SYSTOLIC BLOOD PRESSURE: 146 MMHG

## 2019-01-16 DIAGNOSIS — G47.33 OSA (OBSTRUCTIVE SLEEP APNEA): ICD-10-CM

## 2019-01-16 PROCEDURE — 99214 OFFICE O/P EST MOD 30 MIN: CPT | Performed by: INTERNAL MEDICINE

## 2019-01-16 RX ORDER — ZOLPIDEM TARTRATE 5 MG/1
TABLET ORAL
Qty: 1 TABLET | Refills: 0 | Status: SHIPPED | OUTPATIENT
Start: 2019-01-16 | End: 2019-05-31

## 2019-01-16 ASSESSMENT — MIFFLIN-ST. JEOR: SCORE: 1848.78

## 2019-01-16 NOTE — PATIENT INSTRUCTIONS
Your BMI is Body mass index is 42.77 kg/m .  Weight management is a personal decision.  If you are interested in exploring weight loss strategies, the following discussion covers the approaches that may be successful. Body mass index (BMI) is one way to tell whether you are at a healthy weight, overweight, or obese. It measures your weight in relation to your height.  A BMI of 18.5 to 24.9 is in the healthy range. A person with a BMI of 25 to 29.9 is considered overweight, and someone with a BMI of 30 or greater is considered obese. More than two-thirds of American adults are considered overweight or obese.  Being overweight or obese increases the risk for further weight gain. Excess weight may lead to heart disease and diabetes.  Creating and following plans for healthy eating and physical activity may help you improve your health.  Weight control is part of healthy lifestyle and includes exercise, emotional health, and healthy eating habits. Careful eating habits lifelong are the mainstay of weight control. Though there are significant health benefits from weight loss, long-term weight loss with diet alone may be very difficult to achieve- studies show long-term success with dietary management in less than 10% of people. Attaining a healthy weight may be especially difficult to achieve in those with severe obesity. In some cases, medications, devices and surgical management might be considered.  What can you do?  If you are overweight or obese and are interested in methods for weight loss, you should discuss this with your provider.     Consider reducing daily calorie intake by 500 calories.     Keep a food journal.     Avoiding skipping meals, consider cutting portions instead.    Diet combined with exercise helps maintain muscle while optimizing fat loss. Strength training is particularly important for building and maintaining muscle mass. Exercise helps reduce stress, increase energy, and improves fitness.  Increasing exercise without diet control, however, may not burn enough calories to loose weight.       Start walking three days a week 10-20 minutes at a time    Work towards walking thirty minutes five days a week     Eventually, increase the speed of your walking for 1-2 minutes at time    In addition, we recommend that you review healthy lifestyles and methods for weight loss available through the National Institutes of Health patient information sites:  http://win.niddk.nih.gov/publications/index.htm    And look into health and wellness programs that may be available through your health insurance provider, employer, local community center, or jonatan club.    Weight management plan: Patient was referred to their PCP to discuss a diet and exercise plan.

## 2019-01-16 NOTE — PROGRESS NOTES
"Obstructive Sleep Apnea - PAP Follow-Up Visit:    Chief Complaint   Patient presents with     CPAP Follow Up       Janelle Byrd comes in today for follow-up of their severe sleep apnea, managed with CPAP.     She presented with loud socially disruptive snoring, witnessed apneas, frequent awakenings/sleep maintenance difficulties, excessive daytime sleepiness (ESS 9), obesity, promoinant tonsils.      Home Sleep Apnea Testing - 10/22/18: 263 lbs: AHI 29.4/hr. Supine AHI 51.3/hr.   Oxygen Terry of 77%. Baseline 93%. Sp02 =< 88% for 37 minutes  She slept on her back (28%), prone (0%), left (53) and right (15%) sides.    Elected treatment with auto-CPAP at 5-18 cmH2O.     She started using PAP in November. She was just using mask without CPAP because she had a bad sinus infection.     Overall, she rates the experience with PAP as 2 (0 poor, 10 great). The mask is not comfortable.  The mask is uncomfortable because of \"side sleeper so it digs into my face and neck\".  The mask is not leaking .  She is not snoring with the mask on. She is not having gasp arousals.  She is having significant oral/nasal dryness. The pressure is not comfortable. The pressure is uncomfortable because of \"it's better but still wakes me up on higher setting\".    Her PAP interface is Full Face Mask.    She denies problems falling asleep without CPAP (1-2 times a week has problems falling back to sleep due to an overactive mind).   She reads in bed. She starts falling asleep and then wakes up with PAP on     Bedtime is typically 2330. Usually it takes about 120 min minutes to fall asleep with the mask on. Wake time is typically 0630.  Patient is using PAP therapy 1 hours per night. The patient is usually getting 4 hours of sleep per night.    She does not feel rested in the morning.    Total score - [place]: 9 (1/16/2019 10:00 AM)  SANAM Total Score: 18      [employer]Med   Auto-PAP 5.0 - 18.0 cmH2O 30 day usage data:    6% of days with > " "4 hours of use. 23/30 days with no use.   Average use 31 minutes per day.   95%ile Leak 16.6 L/min.   CPAP 95% pressure 10.6 cm.   AHI 1.75 events per hour.     Recent Labs   Lab Test 02/06/18  1327 03/16/16  1217 01/25/16  1452   NA  --  140 138   POTASSIUM  --  4.2 3.7   CHLORIDE  --  108 107   CO2  --  27 28   ANIONGAP  --  5 3   GLC 87 81 101*   BUN  --  11 15   CR  --  0.78 0.77   ANNALISA  --  8.7 8.1*             Past medical/surgical history, family history, social history, medications and allergies were reviewed.      Problem List:  Patient Active Problem List    Diagnosis Date Noted     SHONDA (obstructive sleep apnea) 10/31/2018     Priority: Medium     Home Sleep Apnea Testing - 10/22/18: 263 lbs 0 oz: AHI 29.4/hr. Supine AHI 51.3/hr.   Oxygen Terry of 77%.  Baseline 93%.  Sp02 =< 88% for 37 minutes  She slept on her back (28%), prone (0%), left (53) and right (15%) sides.        Anxiety 04/13/2016     Priority: Medium     Obesity 05/18/2015     Priority: Medium     Arthritis      Priority: Low     Hands, feet, and knees       GERD (gastroesophageal reflux disease)      Priority: Low     Abnormal gait 12/14/2016     Priority: Low     Primary osteoarthritis of both knees 10/25/2016     Priority: Low     Hip pain 08/05/2014     Priority: Low     CARDIOVASCULAR SCREENING; LDL GOAL LESS THAN 160 05/09/2010     Priority: Low        BP (!) 146/96   Pulse 87   Ht 1.676 m (5' 6\")   Wt 120.2 kg (265 lb)   SpO2 94%   BMI 42.77 kg/m      Impression/Plan:    Severe Sleep apnea. Not Tolerating PAP well, suspect due to insufficient sleep time starting pressures with autopap.   Recommend :  -Increase autoPAP 7-18 cmH20, change to 10 minute ramp  - Polysomnogram with Transcutaneous CO2 Monitoring (TCM)  and all night titration starting at 7 cm H20. Ambien if needed.   -Tolerance techniques discussed.     Patient to follow up with Primary Care provider regarding elevated blood pressure.       Janelle Byrd " will follow up in about 2 week(s).     Twenty-five minutes spent with patient, all of which were spent face-to-face counseling, consulting, coordinating plan of care.      CC:  Yamilka Hill

## 2019-01-21 ENCOUNTER — TELEPHONE (OUTPATIENT)
Dept: SLEEP MEDICINE | Facility: CLINIC | Age: 49
End: 2019-01-21

## 2019-01-21 DIAGNOSIS — G47.33 OSA (OBSTRUCTIVE SLEEP APNEA): ICD-10-CM

## 2019-04-29 DIAGNOSIS — Z12.31 VISIT FOR SCREENING MAMMOGRAM: ICD-10-CM

## 2019-04-29 PROCEDURE — 77067 SCR MAMMO BI INCL CAD: CPT

## 2019-05-07 ENCOUNTER — DOCUMENTATION ONLY (OUTPATIENT)
Dept: SLEEP MEDICINE | Facility: CLINIC | Age: 49
End: 2019-05-07

## 2019-05-07 DIAGNOSIS — G47.33 OSA (OBSTRUCTIVE SLEEP APNEA): ICD-10-CM

## 2019-05-07 NOTE — PROGRESS NOTES
6 month [facility]    STM Recheck Visit     Diagnostic AHI:   29.4  HST    Data only recheck -pt has a follow up visit scheduled for a possible new sleep study.  Appt is scheduled for 5/31/2019 with Leonie Nelson.     Assessment: Pt not meeting objective benchmarks for compliance     Action plan:   pt to follow up per provider request       Device type: Auto-CPAP  PAP settings: CPAP min 5.0 cm  H20     CPAP max 18.0 cm  H20      Objective measures: 14 day rolling measures      Compliance  0 %         Objective measure goal  Compliance   Goal >70%  Leak   Goal < 24 lpm  AHI  Goal < 5  Usage  Goal >240

## 2019-05-31 ENCOUNTER — OFFICE VISIT (OUTPATIENT)
Dept: OBGYN | Facility: CLINIC | Age: 49
End: 2019-05-31
Payer: COMMERCIAL

## 2019-05-31 ENCOUNTER — OFFICE VISIT (OUTPATIENT)
Dept: SLEEP MEDICINE | Facility: CLINIC | Age: 49
End: 2019-05-31
Payer: COMMERCIAL

## 2019-05-31 VITALS
HEIGHT: 66 IN | OXYGEN SATURATION: 98 % | SYSTOLIC BLOOD PRESSURE: 150 MMHG | BODY MASS INDEX: 42.91 KG/M2 | DIASTOLIC BLOOD PRESSURE: 90 MMHG | WEIGHT: 267 LBS | HEART RATE: 105 BPM

## 2019-05-31 VITALS
RESPIRATION RATE: 18 BRPM | BODY MASS INDEX: 42.91 KG/M2 | HEART RATE: 95 BPM | OXYGEN SATURATION: 94 % | WEIGHT: 267 LBS | SYSTOLIC BLOOD PRESSURE: 138 MMHG | HEIGHT: 66 IN | DIASTOLIC BLOOD PRESSURE: 98 MMHG

## 2019-05-31 DIAGNOSIS — R09.02 HYPOXEMIA: ICD-10-CM

## 2019-05-31 DIAGNOSIS — E66.01 CLASS 3 SEVERE OBESITY WITH BODY MASS INDEX (BMI) OF 40.0 TO 44.9 IN ADULT, UNSPECIFIED OBESITY TYPE, UNSPECIFIED WHETHER SERIOUS COMORBIDITY PRESENT (H): ICD-10-CM

## 2019-05-31 DIAGNOSIS — G47.33 OSA (OBSTRUCTIVE SLEEP APNEA): Primary | ICD-10-CM

## 2019-05-31 DIAGNOSIS — Z01.419 ENCOUNTER FOR GYNECOLOGICAL EXAMINATION WITHOUT ABNORMAL FINDING: Primary | ICD-10-CM

## 2019-05-31 DIAGNOSIS — I10 ESSENTIAL HYPERTENSION: ICD-10-CM

## 2019-05-31 DIAGNOSIS — E66.813 CLASS 3 SEVERE OBESITY WITH BODY MASS INDEX (BMI) OF 40.0 TO 44.9 IN ADULT, UNSPECIFIED OBESITY TYPE, UNSPECIFIED WHETHER SERIOUS COMORBIDITY PRESENT (H): ICD-10-CM

## 2019-05-31 DIAGNOSIS — E66.01 MORBID OBESITY (H): ICD-10-CM

## 2019-05-31 PROCEDURE — 99214 OFFICE O/P EST MOD 30 MIN: CPT | Performed by: NURSE PRACTITIONER

## 2019-05-31 PROCEDURE — 99386 PREV VISIT NEW AGE 40-64: CPT | Performed by: OBSTETRICS & GYNECOLOGY

## 2019-05-31 ASSESSMENT — MIFFLIN-ST. JEOR
SCORE: 1857.85
SCORE: 1857.85

## 2019-05-31 NOTE — PROGRESS NOTES
Cc: Patient returns here today in follow up for discuss testing and treatment options for her brinda with hypoxemia    HPI: severe brinda with hypoxemia, difficulty adapting to cpap comorbidities  10/22/18 HST AHI 29.4, 51.3 supine, time with 02 sat <=88% was 37 mins with lowest 02 sat of 77%  DME:jatinraheem  Last supplies/mask:n/a, no use of cpap for approx 2 months    New concerns/changes in health:denies.    Sleep Review of Systems:    Equipment issues: masking comfort has been an issue      Snoring, snort arousals, gasping while on therapy: coughing     Bedpartner c/o's of cpap: no     Heated humidity problems with rainout/excessive dryness, sore throat: no     Opening of mouth while on therapy/excessive leak: full     Swallowing air: no     Skin problems: dry at lips     Insufficient sleep, devoting <7 or more hours to sleep: difficult to identify TST with her toss and turning     Problems falling or staying asleep with cpap: yes     RLS: no:    Naps: : yes    Weight: up 20 #, 1/19    SLEEP SCHEDULE:  Toss turn with wakeups toss and turn 10-20, refall up until 3: brp then sleep 3-6A  After kids to school back to bed 45 mins then toss turn then up, fight it, wait till 3P then 20 mins  Bedtime: 10:30-11 read,                        Range:                  Sleep Latency:  Wakeups: out for a 1/2 hr    Final wakeup: 7 or 8A    vick no alco no, caffeine 1 cup, walk 2x/wkk for 1 mile, bad knees    Download today:none  No use since 3/29/19 on synopsis    Response to therapy:can't stay asleep  Willingness to continue: ok with restudy    VS:   Allergies:    Allergies   Allergen Reactions     Depo-Medrol [Methylprednisolone] Other (See Comments)     Oral thrush after receiving an intra-articular injection, reports that she has family members that have also had this symptom following intra-articular injections.     Dust Mites      Sneezing     Mold Cough     Tape [Adhesive Tape] Rash       Medications:    Current Outpatient  Medications   Medication Sig Dispense Refill     IBUPROFEN PO        metroNIDAZOLE (METROGEL) 0.75 % gel USE 1 APPLICATION QD  3     MULTI-VITAMIN OR TABS 1 tablet daily  0       Problem List:  Patient Active Problem List    Diagnosis Date Noted     Morbid obesity (H) 2019     Priority: Medium     SHONDA (obstructive sleep apnea) 10/31/2018     Priority: Medium     Home Sleep Apnea Testing - 10/22/18: 263 lbs 0 oz: AHI 29.4/hr. Supine AHI 51.3/hr.   Oxygen Terry of 77%.  Baseline 93%.  Sp02 =< 88% for 37 minutes  She slept on her back (28%), prone (0%), left (53) and right (15%) sides.        Arthritis      Priority: Medium     Hands, feet, and knees       GERD (gastroesophageal reflux disease)      Priority: Medium     Abnormal gait 2016     Priority: Medium     Primary osteoarthritis of both knees 10/25/2016     Priority: Medium     Anxiety 2016     Priority: Medium     Obesity 2015     Priority: Medium     Hip pain 2014     Priority: Medium     CARDIOVASCULAR SCREENING; LDL GOAL LESS THAN 160 2010     Priority: Medium        Past Medical/Surgical History:  Past Medical History:   Diagnosis Date     Complex tear of medial meniscus of right knee as current injury, initial encounter 10/25/2016     Low back pain 2014     Nephrolithiasis 2000     S/P meniscectomy 2016     Skin cancer 2001    Basal cell carcinoma on nose     TRANS HYPERTEN-ANTEPART 5/10/2006     Umbilical discharge 2014     Past Surgical History:   Procedure Laterality Date     ARTHROSCOPY SHOULDER  10/2004    LT/to remove calcium deposits     ARTHROSCOPY SHOULDER DECOMPRESSION Left 2014    Procedure: ARTHROSCOPY SHOULDER DECOMPRESSION;  Surgeon: Julien Maciel MD;  Location: [facility] OR     BIOPSY  2001    skin cancer from nose      SECTION  2006    , Low Transverse      SECTION  2008     COLONOSCOPY  2016    Normal     DILATION AND CURETTAGE, OPERATIVE  "HYSTEROSCOPY WITH MORCELLATOR, COMBINED N/A 6/2/2016    Procedure: COMBINED DILATION AND CURETTAGE, OPERATIVE HYSTEROSCOPY WITH MORCELLATOR;  Surgeon: Idalia Mcarthur MD;  Location: [facility] OR           Physical Examination:  Vitals: BP (!) 138/98   Pulse 95   Resp 18   Ht 1.676 m (5' 6\")   Wt 121.1 kg (267 lb)   LMP 05/31/2016 (Approximate)   SpO2 94%   BMI 43.09 kg/m    BMI= Body mass index is 43.09 kg/m .         [place] Total Score 5/31/2019   Total score - [place] 11       GENERAL APPEARANCE: healthy, alert and mild distress       GENERAL APPEARANCE: healthy, alert and no distress  EYES: Eyes grossly normal to inspection  HENT: oropharynx crowded, soft palate dependent, tongue base enlarged and tonsillar enlargement L>R, nares patent  NECK: thyroid normal to palpation  RESP: lungs clear to auscultation - no rales, rhonchi or wheezes  CV: regular rates and rhythm, normal S1 S2, no S3 or S4 and no murmur, click or rub  Extremities are without clubbing, minimal LE edema  Musculoskeletal: Moves without difficulty  Mallampati Class: III-IV  Tonsillar Stage: 3  extending beyond pillars L>R.  Dental: Dentition in good condition.  Good advancement of lower jaw without tmd  Skin: without facial rash      A/P: pt with difficulty adapting to cpap, plan was to do in lab titration, she seems agreeable to this plan.  As we ran out of time, will call her back to arrange further detail.      1. SHONDA: severe with hypoxemia with morbid obesity, sp02 of 94%.  Would benefit from Dr. Simmons's plan of titration study with tcm, abgs and pfts prior. Will do virtual visit asap to further develop a plan of care.   2. Obesity: possible hypoventilation syndrome, \"couldn't stay asleep with cpap\"   3. Hypertension BP is elevated, [place] to follow up with Primary Care provider regarding elevated blood pressure.      Time spent with patient is 25 minutes, of which >50% was spent in counseling, education and coordination of care.        "

## 2019-05-31 NOTE — NURSING NOTE
"Rechecked blood pressure    Vital signs:      BP: (!) 138/98 Pulse: 95   Resp: 18 SpO2: 94 %     Height: 167.6 cm (5' 6\") Weight: 121.1 kg (267 lb)  Estimated body mass index is 43.09 kg/m  as calculated from the following:    Height as of this encounter: 1.676 m (5' 6\").    Weight as of this encounter: 121.1 kg (267 lb).      Margaret Larry [facility] [place] [facility]    "

## 2019-05-31 NOTE — PATIENT INSTRUCTIONS
Virtual visit down the road  Your BMI is Body mass index is 43.09 kg/m .  Weight management is a personal decision.  If you are interested in exploring weight loss strategies, the following discussion covers the approaches that may be successful. Body mass index (BMI) is one way to tell whether you are at a healthy weight, overweight, or obese. It measures your weight in relation to your height.  A BMI of 18.5 to 24.9 is in the healthy range. A person with a BMI of 25 to 29.9 is considered overweight, and someone with a BMI of 30 or greater is considered obese. More than two-thirds of American adults are considered overweight or obese.  Being overweight or obese increases the risk for further weight gain. Excess weight may lead to heart disease and diabetes.  Creating and following plans for healthy eating and physical activity may help you improve your health.  Weight control is part of healthy lifestyle and includes exercise, emotional health, and healthy eating habits. Careful eating habits lifelong are the mainstay of weight control. Though there are significant health benefits from weight loss, long-term weight loss with diet alone may be very difficult to achieve- studies show long-term success with dietary management in less than 10% of people. Attaining a healthy weight may be especially difficult to achieve in those with severe obesity. In some cases, medications, devices and surgical management might be considered.  What can you do?  If you are overweight or obese and are interested in methods for weight loss, you should discuss this with your provider.     Consider reducing daily calorie intake by 500 calories.     Keep a food journal.     Avoiding skipping meals, consider cutting portions instead.    Diet combined with exercise helps maintain muscle while optimizing fat loss. Strength training is particularly important for building and maintaining muscle mass. Exercise helps reduce stress, increase  energy, and improves fitness. Increasing exercise without diet control, however, may not burn enough calories to loose weight.       Start walking three days a week 10-20 minutes at a time    Work towards walking thirty minutes five days a week     Eventually, increase the speed of your walking for 1-2 minutes at time    In addition, we recommend that you review healthy lifestyles and methods for weight loss available through the National Institutes of Health patient information sites:  http://win.niddk.nih.gov/publications/index.htm    And look into health and wellness programs that may be available through your health insurance provider, employer, local community center, or [employer].    Weight management plan: Patient was referred to their PCP to discuss a diet and exercise plan.     Your blood pressure was checked while you were in clinic today.  Please read the guidelines below about what these numbers mean and what you should do about them.  Your systolic blood pressure is the top number.  This is the pressure when the heart is pumping.  Your diastolic blood pressure is the bottom number.  This is the pressure in between beats.  If your systolic blood pressure is less than 120 and your diastolic blood pressure is less than 80, then your blood pressure is normal. There is nothing more that you need to do about it  If your systolic blood pressure is 120-139 or your diastolic blood pressure is 80-89, your blood pressure may be higher than it should be.  You should have your blood pressure re-checked within a year by a primary care provider.  If your systolic blood pressure is 140 or greater or your diastolic blood pressure is 90 or greater, you may have high blood pressure.  High blood pressure is treatable, but if left untreated over time it can put you at risk for heart attack, stroke, or kidney failure.  You should have your blood pressure re-checked by a primary care provider within the next four  weeks.

## 2019-05-31 NOTE — PROGRESS NOTES
Janelle is a 48 year old  who presents for annual exam.   Postmenopausal since 2016.  She is having hot flashes, mild, vaginal dryness, moodiness and decreased mental sharpness. No vaginal bleeding noted.     Besides routine health maintenance, she has no other health concerns today.  She does report gaining weight over the past year or so. Due to a combination of things.  She developed knee problems so has had to stop running, she also developed sleep issues and is unable to use needed CPAP, so that is contributing.  She is aware it is ultimately due to poor eating habits and is in the process of trying to determine best mgmt (surgery vs diet, etc).  She will see her PCP soon to address this as well.  GYNECOLOGIC HISTORY:  Menarche: 13   Age at first intercourse: declined Number of lifetime partners: declined  She is sexually active with 01 male partner(s) and she is currently in monogamous relationship with .    History sexually transmitted infections:No STD history  STI testing offered?  Declined  Estrogen replacement therapy: No  CARO exposure: Unknown    History of abnormal Pap smear: NO - age 30-65 PAP every 5 years with negative HPV co-testing recommended  History of abnormal Pap smear: NO - age 30- 65 PAP every 3 years recommended    Family history of breast CA: Yes (Please explain): Grandmother    Family history of uterine/ovarian CA: No     Family history of colon CA: Yes (Please explain): Father    HEALTH MAINTENANCE:  Cholesterol: (No components found for: CHOL2 ) History of abnormal lipids: No  Mammo: 2019 . History of abnormal Mammo: No  Regular Self Breast Exams: Yes  Colonoscopy: 2019 History of abnormal Colonoscopy: No  Dexa: no History of abnormal Dexa: No  Calcium/Vitamin D intake: source:  dairy Adequate? Yes  TSH: (No components found for: TSH1 )  Pap; (  Lab Results   Component Value Date    PAP NIL 2015    PAP NIL 2012    PAP NIL 2011    )    HISTORY:  OB History     Para Term  AB Living   3 3 2 1 0 3   SAB TAB Ectopic Multiple Live Births   0 0 0 0 3      # Outcome Date GA Lbr Chace/2nd Weight Sex Delivery Anes PTL Lv   3  08 35w4d  2.892 kg (6 lb 6 oz) M CS-Unspec   RICHARD      Birth Comments: repeat CS      Name: Antony      Apgar1: 8  Apgar5: 8   2 Term 06 39w0d  4.536 kg (10 lb) M CS   RICHARD      Birth Comments: FTP      Name: Keon   1 Term 04 38w0d  3.799 kg (8 lb 6 oz) M    RICHARD      Name: Rafi     Past Medical History:   Diagnosis Date     Complex tear of medial meniscus of right knee as current injury, initial encounter 10/25/2016     Low back pain 2014     Nephrolithiasis 2000     S/P meniscectomy 2016     Skin cancer 2001    Basal cell carcinoma on nose     TRANS HYPERTEN-ANTEPART 5/10/2006     Umbilical discharge 2014     Past Surgical History:   Procedure Laterality Date     ARTHROSCOPY SHOULDER  10/2004    LT/to remove calcium deposits     ARTHROSCOPY SHOULDER DECOMPRESSION Left 2014    Procedure: ARTHROSCOPY SHOULDER DECOMPRESSION;  Surgeon: Julien Maciel MD;  Location: [facility] OR     BIOPSY  2001    skin cancer from nose      SECTION  2006    , Low Transverse      SECTION  2008     COLONOSCOPY  2016    Normal     DILATION AND CURETTAGE, OPERATIVE HYSTEROSCOPY WITH MORCELLATOR, COMBINED N/A 2016    Procedure: COMBINED DILATION AND CURETTAGE, OPERATIVE HYSTEROSCOPY WITH MORCELLATOR;  Surgeon: Idalia Mcarthur MD;  Location: [facility]     Family History   Problem Relation Age of Onset     Lipids Mother      Arthritis Mother      Psychotic Disorder Mother      Respiratory Mother         copd     Circulatory Mother         BLOOD CLOTS     Genitourinary Problems Mother      Heart Failure Mother      Depression Mother      Mental Illness Mother         Bi-polar     Obesity Mother      Lipids Father      Alcohol/Drug Father      Arthritis Father      C.A.D. Father       Cancer - colorectal Father      Hypertension Father      Cerebrovascular Disease Father         10/2018     Heart Disease Maternal Grandmother      Breast Cancer Maternal Grandmother      Cerebrovascular Disease Maternal Grandmother      Alcohol/Drug Maternal Grandfather      Cerebrovascular Disease Maternal Grandfather      Alcohol/Drug Paternal Grandmother      Hypertension Paternal Grandmother      C.A.D. Paternal Grandfather      Gastrointestinal Disease Paternal Grandfather         liver diease     Alcohol/Drug Paternal Grandfather      Cerebrovascular Disease Paternal Grandfather      Circulatory Sister         BLOOD CLOTS     Diabetes Brother      Diabetes Sister      Social History     Socioeconomic History     Marital status:      Spouse name: Calvin     Number of children: 3     Years of education: None     Highest education level: None   Occupational History     Occupation: student      Employer: [employer]     Comment: writing instructor   Social Needs     Financial resource strain: None     Food insecurity:     Worry: None     Inability: None     Transportation needs:     Medical: None     Non-medical: None   Tobacco Use     Smoking status: Never Smoker     Smokeless tobacco: Never Used   Substance and Sexual Activity     Alcohol use: Yes     Comment: rare, 2/month     Drug use: No     Sexual activity: Yes     Partners: Male     Birth control/protection: Post-menopausal, Natural Family Planning, Female Surgical     Comment: Tubal ligation 11/08/2008.   Lifestyle     Physical activity:     Days per week: None     Minutes per session: None     Stress: None   Relationships     Social connections:     Talks on phone: None     Gets together: None     Attends Congregation service: None     Active member of club or organization: None     Attends meetings of clubs or organizations: None     Relationship status: None     Intimate partner violence:     Fear of current or ex partner: None      Emotionally abused: None     Physically abused: None     Forced sexual activity: None   Other Topics Concern     Parent/sibling w/ CABG, MI or angioplasty before 65F 55M? Yes     Comment: Father   Social History Narrative    Caffeine intake/servings daily - 1    Calcium intake/servings daily - 2-3    Exercise 2 times weekly - describe walking, treadmill, running    Sunscreen used - Yes    Seatbelts used - Yes    Guns stored in the home - No    Self Breast Exam - Yes    Pap test up to date -  Yes, as of today, 6/7/2010 Normal    Eye exam up to date -  Yes    Dental exam up to date -  Yes    DEXA scan up to date -  Not Applicable    Flex Sig/Colonoscopy up to date -  Not Applicable    Mammography up to date -  Not Applicable    Immunizations reviewed and up to date - Yes, tetanus 3/9/2009    Abuse: Current or Past (Physical, Sexual or Emotional) - No    Do you feel safe in your environment - Yes    Do you cope well with stress - Yes    Do you suffer from insomnia - Yes, staying asleep    Last updated by: Amalia Perez [facility] 8/29/2011                                       Current Outpatient Medications:      IBUPROFEN PO, , Disp: , Rfl:      metroNIDAZOLE (METROGEL) 0.75 % gel, USE 1 APPLICATION QD, Disp: , Rfl: 3     MULTI-VITAMIN OR TABS, 1 tablet daily, Disp: , Rfl: 0     Allergies   Allergen Reactions     Depo-Medrol [Methylprednisolone] Other (See Comments)     Oral thrush after receiving an intra-articular injection, reports that she has family members that have also had this symptom following intra-articular injections.     Dust Mites      Sneezing     Mold Cough     Tape [Adhesive Tape] Rash       Past medical, surgical, social and family history were reviewed and updated in EPIC.    ROS:   C:       NEGATIVE for fever, chills, change in weight  I:         NEGATIVE for worrisome rashes, moles or lesions  E:       NEGATIVE for vision changes or irritation  E/M:   NEGATIVE for ear, mouth and throat problems  R:      "  NEGATIVE for significant cough or SOB  CV:     NEGATIVE for chest pain, palpitations or peripheral edema  GI:      NEGATIVE for nausea, abdominal pain, heartburn, or change in bowel habits  :    NEGATIVE for frequency, dysuria, hematuria, vaginal discharge, or bleeding  M:       NEGATIVE for significant arthralgias or myalgia  N:       NEGATIVE for weakness, dizziness or paresthesias  E:       NEGATIVE for temperature intolerance, skin/hair changes  P:       NEGATIVE for changes in mood or affect    EXAM:  /90   Pulse 105   Ht 1.676 m (5' 6\")   Wt 121.1 kg (267 lb)   LMP 05/31/2016 (Approximate)   SpO2 98%   Breastfeeding? No   BMI 43.09 kg/m     BMI: Body mass index is 43.09 kg/m .  Constitutional: healthy, alert and no distress  Head: Normocephalic. No masses, lesions, tenderness or abnormalities  Neck: Neck supple. Trachea midline. No adenopathy. Thyroid symmetric, normal size.   Cardiovascular: RRR.   Respiratory: Negative.   Breast: No nodularity, asymmetry or nipple discharge bilaterally.  Gastrointestinal: Abdomen soft, non-tender, non-distended. No masses, organomegaly  :  Vulva:  No external lesions, normal female hair distribution, no inguinal adenopathy.    Urethra:  Midline, non-tender, well supported, no discharge  Vagina:  Atrophic, no abnormal discharge, no lesions  Uterus:  Normal size, non-tender, freely mobile.  Limited exam due to body habitus   Ovaries:  No masses appreciated, non-tender, mobile.  Limited exam due to body habitus   Rectal Exam: deferred  Musculoskeletal: extremities normal  Skin: no suspicious lesions or rashes  Psychiatric: Affect appropriate, cooperative,mentation appears normal.     COUNSELING:   Reviewed preventive health counseling, as reflected in patient instructions  Special attention given to:        Regular exercise       Healthy diet/nutrition       Osteoporosis Prevention/Bone Health       Safe sex practices/STD prevention       (Lucrecia)menopause " management   reports that she has never smoked. She has never used smokeless tobacco.    Body mass index is 43.09 kg/m .  Weight management plan: Discussed healthy diet and exercise guidelines    FRAX Risk Assessment  ASSESSMENT:  48 year old  with satisfactory annual exam.  obesity  Plan: pap UTD, due .  Labs with PCP.  mammo UTD.  We had a lengthy discussion about diet and exercise strategies for weight loss.  RTC yearly or prn.    BRANDIE HAMPTON MD    In addition to the preventative exam, 15 min (100% of which) was spent discussing diet and exercise strategies.

## 2019-05-31 NOTE — NURSING NOTE
"Chief Complaint   Patient presents with     Physical       Initial /90   Pulse 105   Ht 1.676 m (5' 6\")   Wt 121.1 kg (267 lb)   LMP 2016 (Approximate)   SpO2 98%   Breastfeeding? No   BMI 43.09 kg/m   Estimated body mass index is 43.09 kg/m  as calculated from the following:    Height as of this encounter: 1.676 m (5' 6\").    Weight as of this encounter: 121.1 kg (267 lb).  BP completed using cuff size: large    Questioned patient about current smoking habits.  Pt. has never smoked.          The following HM Due: NONE      The following patient reported/Care Every where data was sent to:  P ABSTRACT QUALITY INITIATIVES [[id number]]  n/a      patient has appointment for today              "

## 2019-06-05 ENCOUNTER — OFFICE VISIT (OUTPATIENT)
Dept: FAMILY MEDICINE | Facility: CLINIC | Age: 49
End: 2019-06-05
Payer: COMMERCIAL

## 2019-06-05 VITALS
DIASTOLIC BLOOD PRESSURE: 96 MMHG | SYSTOLIC BLOOD PRESSURE: 124 MMHG | HEART RATE: 64 BPM | HEIGHT: 66 IN | TEMPERATURE: 98.2 F | WEIGHT: 264 LBS | BODY MASS INDEX: 42.43 KG/M2

## 2019-06-05 DIAGNOSIS — G47.33 OSA (OBSTRUCTIVE SLEEP APNEA): Chronic | ICD-10-CM

## 2019-06-05 DIAGNOSIS — I10 HYPERTENSION GOAL BP (BLOOD PRESSURE) < 140/90: Primary | ICD-10-CM

## 2019-06-05 DIAGNOSIS — E66.01 MORBID OBESITY (H): ICD-10-CM

## 2019-06-05 LAB
ALBUMIN UR-MCNC: NEGATIVE MG/DL
APPEARANCE UR: CLEAR
BACTERIA #/AREA URNS HPF: ABNORMAL /HPF
BILIRUB UR QL STRIP: NEGATIVE
COLOR UR AUTO: YELLOW
ERYTHROCYTE [DISTWIDTH] IN BLOOD BY AUTOMATED COUNT: 13.9 % (ref 10–15)
GLUCOSE UR STRIP-MCNC: NEGATIVE MG/DL
HCT VFR BLD AUTO: 44.1 % (ref 35–47)
HGB BLD-MCNC: 14.1 G/DL (ref 11.7–15.7)
HGB UR QL STRIP: NEGATIVE
KETONES UR STRIP-MCNC: NEGATIVE MG/DL
LEUKOCYTE ESTERASE UR QL STRIP: ABNORMAL
MCH RBC QN AUTO: 28.2 PG (ref 26.5–33)
MCHC RBC AUTO-ENTMCNC: 32 G/DL (ref 31.5–36.5)
MCV RBC AUTO: 88 FL (ref 78–100)
NITRATE UR QL: NEGATIVE
NON-SQ EPI CELLS #/AREA URNS LPF: ABNORMAL /LPF
PH UR STRIP: 5.5 PH (ref 5–7)
PLATELET # BLD AUTO: 329 10E9/L (ref 150–450)
RBC # BLD AUTO: 5 10E12/L (ref 3.8–5.2)
RBC #/AREA URNS AUTO: ABNORMAL /HPF
SOURCE: ABNORMAL
SP GR UR STRIP: 1.02 (ref 1–1.03)
UROBILINOGEN UR STRIP-ACNC: 0.2 EU/DL (ref 0.2–1)
WBC # BLD AUTO: 11.1 10E9/L (ref 4–11)
WBC #/AREA URNS AUTO: ABNORMAL /HPF

## 2019-06-05 PROCEDURE — 81001 URINALYSIS AUTO W/SCOPE: CPT | Performed by: PHYSICIAN ASSISTANT

## 2019-06-05 PROCEDURE — 85027 COMPLETE CBC AUTOMATED: CPT | Performed by: PHYSICIAN ASSISTANT

## 2019-06-05 PROCEDURE — 99214 OFFICE O/P EST MOD 30 MIN: CPT | Performed by: PHYSICIAN ASSISTANT

## 2019-06-05 PROCEDURE — 36415 COLL VENOUS BLD VENIPUNCTURE: CPT | Performed by: PHYSICIAN ASSISTANT

## 2019-06-05 PROCEDURE — 80048 BASIC METABOLIC PNL TOTAL CA: CPT | Performed by: PHYSICIAN ASSISTANT

## 2019-06-05 PROCEDURE — 84443 ASSAY THYROID STIM HORMONE: CPT | Performed by: PHYSICIAN ASSISTANT

## 2019-06-05 RX ORDER — HYDROCHLOROTHIAZIDE 25 MG/1
12.5 TABLET ORAL DAILY
Qty: 45 TABLET | Refills: 1 | Status: SHIPPED | OUTPATIENT
Start: 2019-06-05 | End: 2020-03-31

## 2019-06-05 ASSESSMENT — ANXIETY QUESTIONNAIRES
3. WORRYING TOO MUCH ABOUT DIFFERENT THINGS: SEVERAL DAYS
2. NOT BEING ABLE TO STOP OR CONTROL WORRYING: NOT AT ALL
1. FEELING NERVOUS, ANXIOUS, OR ON EDGE: NOT AT ALL
6. BECOMING EASILY ANNOYED OR IRRITABLE: SEVERAL DAYS
IF YOU CHECKED OFF ANY PROBLEMS ON THIS QUESTIONNAIRE, HOW DIFFICULT HAVE THESE PROBLEMS MADE IT FOR YOU TO DO YOUR WORK, TAKE CARE OF THINGS AT HOME, OR GET ALONG WITH OTHER PEOPLE: SOMEWHAT DIFFICULT
5. BEING SO RESTLESS THAT IT IS HARD TO SIT STILL: NOT AT ALL
GAD7 TOTAL SCORE: 3
7. FEELING AFRAID AS IF SOMETHING AWFUL MIGHT HAPPEN: NOT AT ALL

## 2019-06-05 ASSESSMENT — PATIENT HEALTH QUESTIONNAIRE - PHQ9
5. POOR APPETITE OR OVEREATING: SEVERAL DAYS
SUM OF ALL RESPONSES TO PHQ QUESTIONS 1-9: 7

## 2019-06-05 ASSESSMENT — MIFFLIN-ST. JEOR: SCORE: 1844.25

## 2019-06-05 NOTE — PATIENT INSTRUCTIONS
Start hydrochlorothiazide 12.5 mg per day (1/2 tablet).  Yamilka or her team will be in touch with you with results.   Return for BP check and labs in 2 weeks.    Yamilka Hill PA-C

## 2019-06-05 NOTE — PROGRESS NOTES
"Subjective     Janelle Abbi Byrd is a 48 year old female who presents to clinic today for the following health issues:    HPI   Concern - high blood pressure  Onset: 1 year to 15 months    Description:   Patient has noticed higher blood pressure readings at her doctor visits.  Does take it at home with a wrist cuff, typically in the yellow zone on that.    Intensity: n/a    Progression of Symptoms:  waxing and waning    Accompanying Signs & Symptoms:  none    Previous history of similar problem:   yes    Precipitating factors:   Worsened by: none    Alleviating factors:  Improved by: none    Continues to struggle with her knees. Has gained weight as a result which has affected her BP.  Has been checking her BP at home   Just started using a CPAP last winter  Just started WW and is hoping to lose weight.    BP Readings from Last 3 Encounters:   06/05/19 (!) 124/96   05/31/19 (!) 138/98   05/31/19 150/90       Therapies Tried and outcome: none  -------------------------------------    -------------------------------------  Reviewed and updated as needed this visit by Provider  Problems         Review of Systems   ROS COMP: Constitutional, HEENT, cardiovascular, pulmonary, gi and gu systems are negative, except as otherwise noted.      Objective    BP (!) 124/96 (Cuff Size: Adult Large)   Pulse 64   Temp 98.2  F (36.8  C) (Oral)   Ht 1.676 m (5' 6\")   Wt 119.7 kg (264 lb)   LMP 05/31/2016 (Approximate)   BMI 42.61 kg/m    Body mass index is 42.61 kg/m .  Physical Exam   GENERAL: alert, no distress and obese  NECK: no adenopathy, no asymmetry, masses, or scars and thyroid normal to palpation  RESP: lungs clear to auscultation - no rales, rhonchi or wheezes  CV: regular rate and rhythm, normal S1 S2, no S3 or S4, no murmur, click or rub, no peripheral edema and peripheral pulses strong  MS: no gross musculoskeletal defects noted, no edema  PSYCH: mentation appears normal, affect " "normal/bright    Diagnostic Test Results:  Labs reviewed in Epic        Assessment & Plan       ICD-10-CM    1. Hypertension goal BP (blood pressure) < 140/90 I10 Basic metabolic panel     TSH with free T4 reflex     CBC with platelets     UA with Microscopic reflex to Culture     hydrochlorothiazide (HYDRODIURIL) 25 MG tablet   2. Morbid obesity (H) E66.01    3. SHONDA (obstructive sleep apnea) G47.33      Above medication as directed with full discussion of risks, benefits, and possible adverse effects.  RTC in 2 weeks for BP check and labs.  Adjust pending results.  Encouraged consistency with CPAP, continued weight loss, regular exercise, etc.      BMI:   Estimated body mass index is 42.61 kg/m  as calculated from the following:    Height as of this encounter: 1.676 m (5' 6\").    Weight as of this encounter: 119.7 kg (264 lb).   Weight management plan: Discussed healthy diet and exercise guidelines        Patient Instructions   Start hydrochlorothiazide 12.5 mg per day (1/2 tablet).  Yamilka or her team will be in touch with you with results.   Return for BP check and labs in 2 weeks.    Yamilka Hill PA-C           No follow-ups on file.    Yamilka Hill PA-C  [facility]      "

## 2019-06-06 ENCOUNTER — TELEPHONE (OUTPATIENT)
Dept: SLEEP MEDICINE | Facility: CLINIC | Age: 49
End: 2019-06-06

## 2019-06-06 LAB
ANION GAP SERPL CALCULATED.3IONS-SCNC: 12 MMOL/L (ref 3–14)
BUN SERPL-MCNC: 13 MG/DL (ref 7–30)
CALCIUM SERPL-MCNC: 9.3 MG/DL (ref 8.5–10.1)
CHLORIDE SERPL-SCNC: 108 MMOL/L (ref 94–109)
CO2 SERPL-SCNC: 21 MMOL/L (ref 20–32)
CREAT SERPL-MCNC: 0.85 MG/DL (ref 0.52–1.04)
GFR SERPL CREATININE-BSD FRML MDRD: 81 ML/MIN/{1.73_M2}
GLUCOSE SERPL-MCNC: 91 MG/DL (ref 70–99)
POTASSIUM SERPL-SCNC: 4.4 MMOL/L (ref 3.4–5.3)
SODIUM SERPL-SCNC: 141 MMOL/L (ref 133–144)
TSH SERPL DL<=0.005 MIU/L-ACNC: 1.39 MU/L (ref 0.4–4)

## 2019-06-06 ASSESSMENT — ANXIETY QUESTIONNAIRES: GAD7 TOTAL SCORE: 3

## 2019-06-13 ENCOUNTER — MYC MEDICAL ADVICE (OUTPATIENT)
Dept: SLEEP MEDICINE | Facility: CLINIC | Age: 49
End: 2019-06-13

## 2019-06-13 RX ORDER — ZOLPIDEM TARTRATE 10 MG/1
TABLET ORAL
Qty: 1 TABLET | Refills: 0 | Status: SHIPPED | OUTPATIENT
Start: 2019-06-13 | End: 2020-05-11

## 2019-06-13 NOTE — TELEPHONE ENCOUNTER
Reviewed options with [facility] with unscheduled phone call.    Plan at [place] was to do a titration study with tcm monitoring, but according to [facility], it was not approved by insurance.  With inability to stay asleep with cpap (with obesity question is could this be hypoventilation syndrome 02 sats at visits 93-94 at some visits in past 6 months, pressure intolerance-may benefit from bipap).  I recommend split night with tcm, prior to study pfts and abgs.  Will send ambien 5mg to fv phar at [place].  Bring sleep aid, with hx of extending sleep in bed with poor night's rest-recommend 2 days before to keep rise time according to her regular schedule, avoiding napping day of sleep study.  Agrees to this poc.    WM

## 2019-06-19 NOTE — TELEPHONE ENCOUNTER
Left message to call on 6/19/2019  at 10:01 AM and informed pt to call back to  [phone] and ask for Margaret with [facility] in [place].    Patient needs PSG, abg, and PFT schedule with in clinic follow up.    Margaret Larry [facility]

## 2019-06-25 ENCOUNTER — DOCUMENTATION ONLY (OUTPATIENT)
Dept: SLEEP MEDICINE | Facility: CLINIC | Age: 49
End: 2019-06-25

## 2019-06-25 NOTE — PROGRESS NOTES
Nasima or Teddy,    Where is the pfts and agbs for [facility]?    I don't see them scheduled or done.    Edgar Hadley

## 2019-06-27 ENCOUNTER — ALLIED HEALTH/NURSE VISIT (OUTPATIENT)
Dept: NURSING | Facility: CLINIC | Age: 49
End: 2019-06-27
Payer: COMMERCIAL

## 2019-06-27 ENCOUNTER — TELEPHONE (OUTPATIENT)
Dept: SLEEP MEDICINE | Facility: CLINIC | Age: 49
End: 2019-06-27

## 2019-06-27 VITALS
HEIGHT: 66 IN | BODY MASS INDEX: 40.98 KG/M2 | SYSTOLIC BLOOD PRESSURE: 122 MMHG | DIASTOLIC BLOOD PRESSURE: 72 MMHG | WEIGHT: 255 LBS | HEART RATE: 78 BPM

## 2019-06-27 DIAGNOSIS — I10 HTN (HYPERTENSION): Primary | ICD-10-CM

## 2019-06-27 PROCEDURE — 80048 BASIC METABOLIC PNL TOTAL CA: CPT | Performed by: PHYSICIAN ASSISTANT

## 2019-06-27 PROCEDURE — 99207 ZZC NO CHARGE NURSE ONLY: CPT

## 2019-06-27 PROCEDURE — 36415 COLL VENOUS BLD VENIPUNCTURE: CPT | Performed by: PHYSICIAN ASSISTANT

## 2019-06-27 ASSESSMENT — MIFFLIN-ST. JEOR: SCORE: 1803.42

## 2019-06-27 NOTE — TELEPHONE ENCOUNTER
Reason for call:  Other   Patient called regarding (reason for call): call back  Additional comments: patient was returning a call back to juliet.    Phone number to reach patient:  Cell number on file:    Telephone Information:   Mobile [phone]       Best Time:  anytime    Can we leave a detailed message on this number?  NO

## 2019-06-27 NOTE — PROGRESS NOTES
DATA: PCP: Yamilka Hill    Indications for Blood Pressure (BP) monitoring: started on new bp medicine         ACTION: Signs and Symptoms:  Headaches?: no  Chest pain?: no  Shortness of breath?: no  Edema?: no  Visual problems?: no  Parathesia?: no  Epitaxis?: no  Dizziness?: no  Hematuria?: no    BP:   BP Readings from Last 1 Encounters:   06/27/19 122/72     Data Unavailable     Diabetic?: no  Heart Disease?: no  Smoking?: no  Alcohol usage?: no  Low sodium diet?: yes  Exercise?: yes 2 times week  Checks blood pressure at home?: yes   Current 3 readings: 148/80    *BP is 130/80 or greater for diabetics or heart disease? no  *BP is 140/90 or greater for non-diabetics? no  *Pulse under 60? no  *Pulse over 110? no    Discussed with patient symptoms of high blood pressure, best ways to measure blood pressure, how blood pressure affects health, risk factors for high blood pressure, and lifestyle changes to help prevent/control high blood pressure.        RESPONSE: Patient verbalizes understanding, denies questions or concerns, and agrees with plan.         PLAN: Patient left in ambulatory condition. Will call or follow-up in clinic during the interim as needed.        CC: Chart to Yamilka Ash,Clinic Rn  [place]

## 2019-06-27 NOTE — TELEPHONE ENCOUNTER
Called patient on 6/27/2019 at 1:33 PM to help get her scheduled. Patient not available. Message left asking patient to call and schedule PSG and PFT    Margaret Larry [facility]

## 2019-06-27 NOTE — PATIENT INSTRUCTIONS
PATIENT INSTRUCTIONS     1.  You will continue current medication regimen unchanged    2.  FOLLOW UP:   Follow up with Provider - in 6 months     3.  Limit total daily sodium to 1500-2000mg per day    Your BP Goal is: less than <140/90 consistently    Today we used a regular cuff on your right arm.      BP Readings from Last 3 Encounters:   06/27/19 122/72   06/05/19 (!) 124/96   05/31/19 (!) 138/98     Pulse Readings from Last 1 Encounters:   06/27/19 78       Today we talked about...    You will need baseline lab tests done within 12 months of beginning a new blood pressure medication and during/after medication adjustment is complete.    Last Labs:  Sodium   Date Value Ref Range Status   06/05/2019 141 133 - 144 mmol/L Final      Potassium   Date Value Ref Range Status   06/05/2019 4.4 3.4 - 5.3 mmol/L Final     Urea Nitrogen   Date Value Ref Range Status   06/05/2019 13 7 - 30 mg/dL Final     Creatinine   Date Value Ref Range Status   06/05/2019 0.85 0.52 - 1.04 mg/dL Final     GFR Estimate   Date Value Ref Range Status   06/05/2019 81 >60 mL/min/[1.73_m2] Final     Comment:     Non  GFR Calc  Starting 12/18/2018, serum creatinine based estimated GFR (eGFR) will be   calculated using the Chronic Kidney Disease Epidemiology Collaboration   (CKD-EPI) equation.         Your pharmacist is a great resource for questions regarding your medication's side effects and potential interactions.     If you are having a side effect from your medication, please contact your primary provider.     If you believe you are experiencing a life threatening reaction to your medication call 911 immediately.     Mai Ash RN     Limiting your intake of salt (sodium) to 1,500-2,000mg per day will help lower your blood pressure.  One great way to do that is using spices for flavor and reading labels to spot hidden sodium in processed and canned foods.  For more information and some great recipe ideas you can visit  MRS. DASH  salt free seasoning at http://www.[employer]/?s_cid=c3a:google:brand:mrs+dash  (She even has a FACEBOOK page).    We also talked about portion control and how adding more whole grains, fresh fruits and vegetables can help you lose weight which has a direct effect on lowering your blood pressure.  Try taking the  Portion Distortion Quiz  at http://rq6417.nhlbihin.net/portion/portion.cgi?action=question&number=1 it s only 11 questions.    Think of meat as a side dish rather than the main course by loading up on veggies and healthy grains.  You ll feel full sooner and longer.  This is a great opportunity to try new vegetables and preparation methods.  Have fun with this!  Physical activity not only helps control your blood pressure but also helps manage your weight, strengthen your heart and manage your stress level.  Any activity you can add to your day is helpful.  American Heart Association has some good suggestions at http://www.americanheart.org/presenter.jhtml?mkxfnynoay=1314338    Ideally you will work your way up to 30 minutes of moderate exercise - such as brisk walking - per day, at least 5 days a week.  If you want to take three 10 minute walks, or two 15 minute brisk walks, per day that is ok.  You re working too hard if you get out of breath quickly and have to stop to catch your breath or short sentences feel like a strain.    Warming up before and cooling down after exercising helps decrease your risk of injury and soreness.  A good rule of thumb to remember is,  Do slowly what you are about to do quickly.   Don't forget to breathe!  Holding your breath can increase your blood pressure.  Find your rhythm!

## 2019-06-27 NOTE — Clinical Note
Blood pressure 122/72  taking 12.5 hydrochlorothiazide. Had labs drawn today. No symptoms. Joined [employer] down 5 lbs since last ov 6/5/19. She will need refills if you plan to continue with same dose and med.

## 2019-06-28 LAB
ANION GAP SERPL CALCULATED.3IONS-SCNC: 8 MMOL/L (ref 3–14)
BUN SERPL-MCNC: 14 MG/DL (ref 7–30)
CALCIUM SERPL-MCNC: 9.2 MG/DL (ref 8.5–10.1)
CHLORIDE SERPL-SCNC: 108 MMOL/L (ref 94–109)
CO2 SERPL-SCNC: 25 MMOL/L (ref 20–32)
CREAT SERPL-MCNC: 0.94 MG/DL (ref 0.52–1.04)
GFR SERPL CREATININE-BSD FRML MDRD: 72 ML/MIN/{1.73_M2}
GLUCOSE SERPL-MCNC: 89 MG/DL (ref 70–99)
POTASSIUM SERPL-SCNC: 3.9 MMOL/L (ref 3.4–5.3)
SODIUM SERPL-SCNC: 141 MMOL/L (ref 133–144)

## 2019-07-01 NOTE — TELEPHONE ENCOUNTER
Patient called on 7/1/2019 at 4:55 PM and was scheduled for PFT. PSG is scheduled for Friday the 5th.        Margaret Larry [facility]

## 2019-07-02 DIAGNOSIS — R09.02 HYPOXEMIA: ICD-10-CM

## 2019-07-02 DIAGNOSIS — G47.33 OSA (OBSTRUCTIVE SLEEP APNEA): ICD-10-CM

## 2019-07-02 DIAGNOSIS — E66.01 CLASS 3 SEVERE OBESITY WITH BODY MASS INDEX (BMI) OF 40.0 TO 44.9 IN ADULT, UNSPECIFIED OBESITY TYPE, UNSPECIFIED WHETHER SERIOUS COMORBIDITY PRESENT (H): ICD-10-CM

## 2019-07-02 DIAGNOSIS — E66.813 CLASS 3 SEVERE OBESITY WITH BODY MASS INDEX (BMI) OF 40.0 TO 44.9 IN ADULT, UNSPECIFIED OBESITY TYPE, UNSPECIFIED WHETHER SERIOUS COMORBIDITY PRESENT (H): ICD-10-CM

## 2019-07-02 LAB
DLCOCOR-%PRED-PRE: 118 %
DLCOCOR-PRE: 26.89 ML/MIN/MMHG
DLCOUNC-%PRED-PRE: 120 %
DLCOUNC-PRE: 27.28 ML/MIN/MMHG
DLCOUNC-PRED: 22.69 ML/MIN/MMHG
ERV-%PRED-PRE: 62 %
ERV-PRE: 0.29 L
ERV-PRED: 0.46 L
EXPTIME-PRE: 7.69 SEC
FEF2575-%PRED-PRE: 73 %
FEF2575-PRE: 2.17 L/SEC
FEF2575-PRED: 2.95 L/SEC
FEFMAX-%PRED-PRE: 104 %
FEFMAX-PRE: 7.46 L/SEC
FEFMAX-PRED: 7.11 L/SEC
FEV1-%PRED-PRE: 89 %
FEV1-PRE: 2.68 L
FEV1FEV6-PRE: 77 %
FEV1FEV6-PRED: 83 %
FEV1FVC-PRE: 77 %
FEV1FVC-PRED: 81 %
FEV1SVC-PRE: 75 %
FEV1SVC-PRED: 80 %
FIFMAX-PRE: 4.81 L/SEC
FIO2-PRE: 21 %
FRCPLETH-%PRED-PRE: 89 %
FRCPLETH-PRE: 2.51 L
FRCPLETH-PRED: 2.8 L
FVC-%PRED-PRE: 93 %
FVC-PRE: 3.49 L
FVC-PRED: 3.76 L
IC-%PRED-PRE: 99 %
IC-PRE: 3.3 L
IC-PRED: 3.31 L
MEP-PRE: 95 CMH2O
MIP-PRE: -100 CMH2O
RVPLETH-%PRED-PRE: 123 %
RVPLETH-PRE: 2.22 L
RVPLETH-PRED: 1.8 L
TLCPLETH-%PRED-PRE: 110 %
TLCPLETH-PRE: 5.81 L
TLCPLETH-PRED: 5.27 L
VA-%PRED-PRE: 93 %
VA-PRE: 4.96 L
VC-%PRED-PRE: 95 %
VC-PRE: 3.59 L
VC-PRED: 3.78 L

## 2019-07-03 ENCOUNTER — TRANSFERRED RECORDS (OUTPATIENT)
Dept: HEALTH INFORMATION MANAGEMENT | Facility: CLINIC | Age: 49
End: 2019-07-03

## 2019-07-05 ENCOUNTER — THERAPY VISIT (OUTPATIENT)
Dept: SLEEP MEDICINE | Facility: CLINIC | Age: 49
End: 2019-07-05
Payer: COMMERCIAL

## 2019-07-05 ENCOUNTER — DOCUMENTATION ONLY (OUTPATIENT)
Dept: SLEEP MEDICINE | Facility: CLINIC | Age: 49
End: 2019-07-05

## 2019-07-05 DIAGNOSIS — E66.01 CLASS 3 SEVERE OBESITY WITH BODY MASS INDEX (BMI) OF 40.0 TO 44.9 IN ADULT, UNSPECIFIED OBESITY TYPE, UNSPECIFIED WHETHER SERIOUS COMORBIDITY PRESENT (H): ICD-10-CM

## 2019-07-05 DIAGNOSIS — I10 ESSENTIAL HYPERTENSION: ICD-10-CM

## 2019-07-05 DIAGNOSIS — R09.02 HYPOXEMIA: ICD-10-CM

## 2019-07-05 DIAGNOSIS — G47.33 OSA (OBSTRUCTIVE SLEEP APNEA): ICD-10-CM

## 2019-07-05 DIAGNOSIS — E66.813 CLASS 3 SEVERE OBESITY WITH BODY MASS INDEX (BMI) OF 40.0 TO 44.9 IN ADULT, UNSPECIFIED OBESITY TYPE, UNSPECIFIED WHETHER SERIOUS COMORBIDITY PRESENT (H): ICD-10-CM

## 2019-07-05 LAB
BASE DEFICIT BLDA-SCNC: 2 MMOL/L
COHGB MFR BLD: 0.6 % (ref 0–2)
HCO3 BLD-SCNC: 22 MMOL/L (ref 21–28)
HGB BLD-MCNC: 14.1 G/DL (ref 11.7–15.7)
METHGB MFR BLD: 0.2 % (ref 0–3)
O2/TOTAL GAS SETTING VFR VENT: 21 %
OXYHGB MFR BLD: 96 % (ref 92–100)
PCO2 BLD: 37 MM HG (ref 35–45)
PH BLD: 7.39 PH (ref 7.35–7.45)
PO2 BLD: 81 MM HG (ref 80–105)

## 2019-07-05 PROCEDURE — 95811 POLYSOM 6/>YRS CPAP 4/> PARM: CPT | Performed by: INTERNAL MEDICINE

## 2019-07-06 NOTE — PROGRESS NOTES
Completed a split night PSG per provider order.    Preliminary AHI 7.9.  A final therapeutic PAP pressure was achieved.    Supine REM was seen on therapeutic pressure.    Patient reports feeling refreshed in AM.    Transcutaneous C02 monitoring.        ROS      Physical Exam

## 2019-07-10 ENCOUNTER — TELEPHONE (OUTPATIENT)
Dept: SLEEP MEDICINE | Facility: CLINIC | Age: 49
End: 2019-07-10

## 2019-07-10 NOTE — TELEPHONE ENCOUNTER
Message left for pt to call back to schedule virtual visit with Leonie to discuss PSG results.    LANDRY Vila

## 2019-07-11 ENCOUNTER — TELEPHONE (OUTPATIENT)
Dept: SLEEP MEDICINE | Facility: CLINIC | Age: 49
End: 2019-07-11

## 2019-07-11 NOTE — TELEPHONE ENCOUNTER
Patient called and scheduled. 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     Margaret Larry [facility] [place] [facility]

## 2019-07-11 NOTE — TELEPHONE ENCOUNTER
Reason for call: Per patient: Is there anyway I can get a virtual phone visit regarding my sleep study results?    Phone number to reach patient:  Cell number on file:    Telephone Information:   Mobile [phone]       Best Time:  Anytime    Can we leave a detailed message on this number?  YES

## 2019-07-12 LAB — SLPCOMP: NORMAL

## 2019-07-12 NOTE — PROCEDURES
" SLEEP STUDY INTERPRETATION  SPLIT NIGHT STUDY      Patient: SHARA SPRAGUE  YOB: 1970  Study Date: 7/5/2019  MRN: [id number]  Referring Provider: Self  Ordering Provider: JEANETTE Pelletier CNP    Indications for Polysomnography: The patient is a 48 y old Female who is 5' 6\" and weighs 255.0 lbs. Her BMI is 41.5, [place] sleepiness scale 11.0 and neck circumference is 36.0 cm. Relevant medical history includes SHONDA, GERD, and anxiety. A diagnostic polysomnogram was performed to revaluate for previously diagnosed sleep related breathing disorder. HST obtained on 10/22/18, showed severe SHONDA with hypoxemia ( AHI 29.4, 51.3 supine, time with 02 sat <=88% was 37 mins with lowest 02 sat of 77%). Patient reports difficulty adapting to CPAP.  After 143.0 minutes of sleep time the patient exhibited sufficient respiratory events qualifying her for a CPAP trial which was then initiated.    Polysomnogram Data: A full night polysomnogram recorded the standard physiologic parameters including EEG, EOG, EMG, ECG, nasal and oral airflow. Respiratory parameters of chest and abdominal movements were recorded with respiratory inductance plethysmography. Oxygen saturation was recorded by pulse oximetry.  Hypopnea scoring rule used: 1B 4%    Diagnostic PSG  Sleep Architecture: Mild sleep fragmentation and reduced sleep efficiency. REM sleep was not observed during the baseline.  The total recording time of the polysomnogram was 182.0 minutes. The total sleep time was 143.0 minutes. Sleep latency was normal at 18.0 minutes with the use of a sleep aid (5 mg of zolpidem at 10:50 PM and additional 5 mg zolpidem at 2:05 AM). REM sleep was not seen. Arousal index was increased at 22.2 arousals per hour. Sleep efficiency was decreased at 78.6%. Wake after sleep onset was 13.0 minutes. The patient spent 8.0% of total sleep time in Stage N1, 52.8% in Stage N2, 39.2% in Stage N3, and 0.0% in REM.    Respiration: Mild " obstructive sleep apnea, predominant during supine sleep position with sleep associated hypoxemia.  Since REM sleep was not observed during the baseline, it is plausible that the overall severity of the sleep-related breathing disorder may have been underestimated.    Events ? The polysomnogram revealed a presence of - obstructive, - central, and - mixed apneas resulting in an apnea index of - events per hour. There were 17 obstructive hypopneas and - central hypopneas resulting in an obstructive hypopnea index of 7.1 and central hypopnea index of - events per hour. The combined apnea/hypopnea index was 7.1 events per hour (central apnea/hypopnea index was - events per hour).  The REM AHI was - events per hour. The supine AHI was 23.7 events per hour. The RERA index was 2.1 events per hour. The RDI was 9.2 events per hour.    Snoring - was reported as moderate.    Respiratory rate and pattern - was notable for normal respiratory rate and pattern.    Sustained Sleep Associated Hypoventilation - Transcutaneous carbon dioxide monitoring was used, however significant hypoventilation was not present with a maximum change from 38 to 45mmHg.    Sleep Associated Hypoxemia - (Greater than 5 minutes O2 sat at or below 88%) was present. Baseline oxygen saturation was 92.7%. Lowest oxygen saturation was 78.5%. Time spent less than or equal to 88% was 11.9 minutes. Time spent less than or equal to 89% was 13.8 minutes.     Treatment PSG  Sleep Architecture: With CPAP treatment, sleep was more consolidated and there was improvement in the overall sleep efficiency.  All sleep stages were observed with rebound increase in the REM sleep.  At 02:11:21 AM the patient was placed on PAP treatment and was titrated at pressures ranging from CPAP 5 cmH2O up to CPAP 6 cmH2O. The total recording time of the treatment portion of the study was 228.3 minutes. The total sleep time was 208.0 minutes. During the treatment portion of the study the  sleep latency was 6.5 minutes. REM latency was 61.0 minutes. Arousal index was normal at 14.1 arousals per hour. Sleep efficiency was increased at 91.1%. Wake after sleep onset was 13.0 minutes. The patient spent 6.5% of total sleep time in Stage N1, 48.8% in Stage N2, 20.7% in Stage N3, and 24.0% in REM. Time in REM supine was - minutes.     Respiration: CPAP at pressure setting of 6 cm water was adequate in eliminating obstructive events during NREM and REM sleep in lateral position.  Optimum treatment settings to control disordered breathing events during REM sleep in supine position could not be achieved, hence treatment with auto titrating CPAP device is a better option.    CPAP titration was initiated with CPAP at 5 cm of water subsequently increased up to a final setting of 6 cm water.  With the CPAP at 5 cm of water NREM supine sleep was observed, with flow limitation and snoring.  At the final pressure setting at 6 cmH2O, NREM and REM sleep were seen in lateral position with improvement in obstructive events. Sleep associated hypoxemia that was observed during the baseline resolved with CPAP treatment.  The TCM pCO2 at the final CPAP pressure setting of 6 cm water during REM sleep was 39 mmHg.    The final pressure was CPAP 6 cmH2O with an AHI of 1.3 events per hour. Time in REM supine on final pressure was - minutes.   - This titration was considered adequate (residual AHI with 75% decrease or above constraints without REM?supine sleep at final pressure).    Movement Activity: There were no periodic limb movements or abnormal sleep-related behaviors.      Periodic Limb Movements  o During the diagnostic portion of the study, there were - PLMs recorded. The PLM index was - movements per hour. The PLM Arousal Index was - per hour.  o During the treatment portion of the study, there were - PLMs recorded. The PLM index was - movements per hour. The PLM Arousal Index was - per hour.    REM EMG Activity -  Excessive transient/sustained muscle activity was not present.    Nocturnal Behavior - Abnormal sleep related behaviors were not noted during/arising out of NREM / REM sleep.     Bruxism - None apparent.    Cardiac Summary: Normal sinus rhythm was noted with occasional premature ventricular contractions.  During the diagnostic portion of the study, the average pulse rate was 73.1 bpm. The minimum pulse rate was 58.8 bpm while the maximum pulse rate was 96.3 bpm.    During the treatment portion of the study, the average pulse rate was 70.2 bpm. The minimum pulse rate was 59.4 bpm while the maximum pulse rate was 106.0 bpm.     Normal sinus rhythm was noted with occasional premature ventricular contractions.      Assessment:     Mild obstructive sleep apnea, predominant during supine sleep position with sleep associated hypoxemia.  Since REM sleep was not observed during the baseline, it is plausible that the overall severity of the sleep-related breathing disorder may have been underestimated.    CPAP at pressure setting of 6 cm water was adequate in eliminating obstructive events during NREM and REM sleep in lateral position.  With the CPAP at 5 cm of water NREM supine sleep was observed, with flow limitation and snoring. Treatment with auto titrating CPAP device is a better option to control disordered breathing events during supine sleep position.       Sleep architecture during baseline was remarkable for mild sleep fragmentation and reduced sleep efficiency. REM sleep was not observed. With CPAP treatment, sleep was more consolidated and there was improvement in the overall sleep efficiency.  All sleep stages were observed with rebound increase in the REM sleep.    There were no periodic limb movements or abnormal sleep-related behaviors.      Normal sinus rhythm was noted with occasional premature ventricular contractions.      Recommendations:    Treatment of SHONDA with Auto?titrating CPAP therapy with a range  of 6 cmH2O to 12 cmH2O. Recommend clinical follow up with sleep management team, including review of compliance measures.    Alternate option to treat sleep apnea includes combination of positional therapy during sleep and dental appliance through referral to Sleep Dentistry.    Advice regarding the risks of drowsy driving.    Suggest optimizing sleep schedule and avoiding sleep deprivation.    Weight management (if BMI > 30).    Pharmacologic therapy should be used for management of restless legs syndrome only if present and clinically indicated and not based on the presence of periodic limb movements alone.    Diagnostic Codes:   Obstructive Sleep Apnea G47.33  Sleep Hypoxemia/Hypoventilation G47.36   Repetitive Intrusions Into Sleep F51.8      7/5/2019 [facility] Sleep Study (255.0 lbs) - AHI 7.1, RDI 9.2, Supine AHI 23.7, REM AHI -, Low O2% 78.5%, Time Spent ?88% 11.9, Time Spent ?89% 13.8. Treatment was titrated to a pressure of CPAP 6 with an AHI 1.3. Time spent in REM supine at this pressure was - minutes.     _____________________________________   Electronically Signed By: (Radha Jaime MD), 7/12/2019

## 2019-08-22 ENCOUNTER — VIRTUAL VISIT (OUTPATIENT)
Dept: SLEEP MEDICINE | Facility: CLINIC | Age: 49
End: 2019-08-22
Payer: COMMERCIAL

## 2019-08-22 DIAGNOSIS — G47.33 OSA (OBSTRUCTIVE SLEEP APNEA): Primary | ICD-10-CM

## 2019-08-22 PROCEDURE — 98967 PH1 ASSMT&MGMT NQHP 11-20: CPT | Performed by: NURSE PRACTITIONER

## 2019-08-22 NOTE — PROGRESS NOTES
"  CC: phone visit to discuss split night study results    HPI:: (from PSG split night study report) The patient is a 48 y old Female who is 5' 6\" and weighs 255.0 lbs. Her BMI is 41.5, [place] sleepiness scale 11.0 and neck circumference is 36.0 cm. Relevant medical history includes SHONDA, GERD, and anxiety. A diagnostic polysomnogram was performed to reevaluate for previously diagnosed sleep related breathing disorder. HST obtained on 10/22/18, showed severe SHONDA with hypoxemia ( AHI 29.4, 51.3 supine, time with 02 sat <=88% was 37 mins with lowest 02 sat of 77%). Patient reports difficulty adapting to CPAP.      The combined apnea/hypopnea index was 7.1 events per hour (central apnea/hypopnea index was - events per hour).  The REM AHI was - events per hour. The supine AHI was 23.7 events per hour. The RERA index was 2.1 events per hour. The RDI was 9.2 events per hour.    Snoring - was reported as moderate.    Respiratory rate and pattern - was notable for normal respiratory rate and pattern.    Sustained Sleep Associated Hypoventilation - Transcutaneous carbon dioxide monitoring was used, however significant hypoventilation was not present with a maximum change from 38 to 45mmHg.    Sleep Associated Hypoxemia - (Greater than 5 minutes O2 sat at or below 88%) was present. Baseline oxygen saturation was 92.7%. Lowest oxygen saturation was 78.5%. Time spent less than or equal to 88% was 11.9 minutes. Time spent less than or equal to 89% was 13.8 minutes.      After 143.0 minutes of sleep time the patient exhibited sufficient respiratory events qualifying her for a CPAP trial which was then initiated.With the CPAP at 5 cm of water NREM supine sleep was observed, with flow limitation and snoring.  At the final pressure setting at 6 cmH2O, NREM and REM sleep were seen in lateral position with improvement in obstructive events. Sleep associated hypoxemia that was observed during the baseline resolved with CPAP treatment.  " The TCM pCO2 at the final CPAP pressure setting of 6 cm water during REM sleep was 39 mmHg.    The final pressure was CPAP 6 cmH2O with an AHI of 1.3 events per hour. Time in REM supine on final pressure was - minutes.   This titration was considered adequate (residual AHI with 75% decrease or above constraints without REM?supine sleep at final pressure).    Reviewed study results, low pressure required, 5, 6 cm h20, and likely higher when REM supine is likely to be achieved in own bed.    A/P:  1. SHONDA, mild, without hypoventilation and with mild hypoxemia present  Will change pressure setting.  Check with pt, is mask at home working well.  Follow up in 6 wks after resetting pressure.   2. Mild hypoxemia, should check with overnight oximetry.    Time spent with patient is 15 minutes with a scheduled phone visit, of which >50% was spent in counseling, education and coordination of care.

## 2019-08-27 ENCOUNTER — MYC MEDICAL ADVICE (OUTPATIENT)
Dept: SLEEP MEDICINE | Facility: CLINIC | Age: 49
End: 2019-08-27

## 2019-08-27 DIAGNOSIS — G47.33 OSA (OBSTRUCTIVE SLEEP APNEA): Primary | ICD-10-CM

## 2019-08-27 DIAGNOSIS — G47.34 NOCTURNAL HYPOXEMIA: ICD-10-CM

## 2019-08-29 NOTE — TELEPHONE ENCOUNTER
"CC: revisit plan of care with a phone call    HPI:The patient is a 48 y old Female who is 5' 6\" and weighs 255.0 lbs. Her BMI is 41.5, [place] sleepiness scale 11.0 and neck circumference is 36.0 cm. Relevant medical history includes SHONDA, GERD, and anxiety. A diagnostic polysomnogram was performed to reevaluate for previously diagnosed sleep related breathing disorder. HST obtained on 10/22/18, showed severe SHONDA with hypoxemia ( AHI 29.4, 51.3 supine, time with 02 sat <=88% was 37 mins with lowest 02 sat of 77%). Intolerance to cpap,. Restudied, 7/5/19 PSG with low AHI, mild hypoxemia with 11.9 min <=88%, no REM supine at final pressure. Reviewed study results again with pt, pt does not sleep at 10 degrees at home, does bunch up 2 pillows. At the final pressure setting at 6 cmH2O, NREM and REM sleep were seen in lateral position with improvement in obstructive events. Sleep associated hypoxemia that was observed during the baseline resolved with CPAP treatment.  The TCM pCO2 at the final CPAP pressure setting of 6 cm water during REM sleep was 39 mmHg.    The final pressure was CPAP 6 cmH2O with an AHI of 1.3 events per hour. Time in REM supine on final pressure was - minutes.   This titration was considered adequate (residual AHI with 75% decrease or above constraints without REM?supine sleep at final pressure).    A/p: With previous intolerance to cpap: Suggest: lower pressure (will do with stm), which is what she likely needs while lateral in REM, elevate HOB with 2 ea 2x4's for some elevation.  MAGDA and follow up with me in 8 wks.                  "

## 2019-08-29 NOTE — TELEPHONE ENCOUNTER
Cpap pressure changed from original settings of 5 Min - 18 Max to new settings of 5 Min - 15 Max.      LANDRY Vila

## 2019-11-04 ENCOUNTER — HEALTH MAINTENANCE LETTER (OUTPATIENT)
Age: 49
End: 2019-11-04

## 2020-03-28 DIAGNOSIS — I10 HYPERTENSION GOAL BP (BLOOD PRESSURE) < 140/90: ICD-10-CM

## 2020-03-28 NOTE — LETTER
April 6, 2020      Janelle Alonso [address] [place] [address]        Dear Janelle,         We have attempted to reach you regarding your refill request, but have been unsuccessful.    We have received a refill request for one of your medications. We have sent a refill of your medication to your pharmacy, however our provider has noted that you will need to have a phone appointment for a follow up visit in order to continue this medication.    Please contact us at [phone] to schedule an appointment for a telephone appointment with a new provider before your next refill is due.          Thank you,      Your Health Care Team at the [facility]

## 2020-03-28 NOTE — TELEPHONE ENCOUNTER
"Requested Prescriptions   Pending Prescriptions Disp Refills     hydrochlorothiazide (HYDRODIURIL) 25 MG tablet [Pharmacy Med Name: HYDROCHLOROTHIAZIDE 25MG TABLETS]  Last Written Prescription Date:  6/5/2019  Last Fill Quantity: 45 tabs,  # refills: 1   Last office visit: 6/5/2019 with prescribing provider:  Liz   Future Office Visit:   45 tablet 1     Sig: TAKE 1/2 TABLET(12.5 MG) BY MOUTH DAILY       Diuretics (Including Combos) Protocol Failed - 3/28/2020  9:26 AM        Failed - Recent (12 mo) or future (30 days) visit within the authorizing provider's specialty     Patient has had an office visit with the authorizing provider or a provider within the authorizing providers department within the previous 12 mos or has a future within next 30 days. See \"Patient Info\" tab in inbasket, or \"Choose Columns\" in Meds & Orders section of the refill encounter.              Passed - Blood pressure under 140/90 in past 12 months     BP Readings from Last 3 Encounters:   06/27/19 122/72   06/05/19 (!) 124/96   05/31/19 (!) 138/98                 Passed - Medication is active on med list        Passed - Patient is age 18 or older        Passed - No active pregancy on record        Passed - Normal serum creatinine on file in past 12 months     Recent Labs   Lab Test 06/27/19  1423   CR 0.94              Passed - Normal serum potassium on file in past 12 months     Recent Labs   Lab Test 06/27/19  1423   POTASSIUM 3.9                    Passed - Normal serum sodium on file in past 12 months     Recent Labs   Lab Test 06/27/19  1423                 Passed - No positive pregnancy test in past 12 months            "

## 2020-03-31 RX ORDER — HYDROCHLOROTHIAZIDE 25 MG/1
TABLET ORAL
Qty: 45 TABLET | Refills: 0 | Status: SHIPPED | OUTPATIENT
Start: 2020-03-31 | End: 2020-05-11

## 2020-03-31 NOTE — TELEPHONE ENCOUNTER
Routed to team. Please reach out to patient for a follow up to establish care with a new provider. Refill has been sent in.    Catherine Rubio RN

## 2020-04-06 NOTE — TELEPHONE ENCOUNTER
LMOM for patient to call back to schedule a phone appt to establish care with a new provider.    Paxton Valencia

## 2020-05-11 ENCOUNTER — VIRTUAL VISIT (OUTPATIENT)
Dept: FAMILY MEDICINE | Facility: CLINIC | Age: 50
End: 2020-05-11
Payer: COMMERCIAL

## 2020-05-11 DIAGNOSIS — E66.01 MORBID OBESITY (H): Primary | ICD-10-CM

## 2020-05-11 DIAGNOSIS — I10 HYPERTENSION GOAL BP (BLOOD PRESSURE) < 140/90: ICD-10-CM

## 2020-05-11 PROCEDURE — 99214 OFFICE O/P EST MOD 30 MIN: CPT | Mod: 95 | Performed by: PHYSICIAN ASSISTANT

## 2020-05-11 RX ORDER — HYDROCHLOROTHIAZIDE 25 MG/1
25 TABLET ORAL DAILY
Qty: 90 TABLET | Refills: 1 | Status: SHIPPED | OUTPATIENT
Start: 2020-05-11 | End: 2020-11-13

## 2020-05-11 NOTE — PROGRESS NOTES
"Janelle Byrd is a 49 year old female who is being evaluated via a billable telephone visit.      The patient has been notified of following:     \"This telephone visit will be conducted via a call between you and your physician/provider. We have found that certain health care needs can be provided without the need for a physical exam.  This service lets us provide the care you need with a short phone conversation.  If a prescription is necessary we can send it directly to your pharmacy.  If lab work is needed we can place an order for that and you can then stop by our lab to have the test done at a later time.    Telephone visits are billed at different rates depending on your insurance coverage. During this emergency period, for some insurers they may be billed the same as an in-person visit.  Please reach out to your insurance provider with any questions.    If during the course of the call the physician/provider feels a telephone visit is not appropriate, you will not be charged for this service.\"    Patient has given verbal consent for Telephone visit?  Yes    What phone number would you like to be contacted at? [phone]    How would you like to obtain your AVS? Chacho Hurst     Janelle Byrd is a 49 year old female who presents to clinic today for the following health issues:    HPI  Hypertension Follow-up      Do you check your blood pressure regularly outside of the clinic? Yes     Are you following a low salt diet? Yes    Are your blood pressures ever more than 140 on the top number (systolic) OR more   than 90 on the bottom number (diastolic), for example 140/90? No  150-160 for top on occasion   Has been dealing with some tooth pain recently also, needs a root canal and crown replaced and her BP has been high when pain is at it worst  Reports tried a full dose of hydrochlorothiazide at 25 mg and her BP was better. Denies symptoms of concern " otherwise.    Weight is down a bit. She's committed to reducing her weight with focus on diet and exercise. She's been successful in the past with this. She's down around 15 pounds.    Patient Active Problem List   Diagnosis     CARDIOVASCULAR SCREENING; LDL GOAL LESS THAN 160     Hip pain     Obesity     Anxiety     Primary osteoarthritis of both knees     Abnormal gait     Arthritis     GERD (gastroesophageal reflux disease)     SHONDA (obstructive sleep apnea)     Morbid obesity (H)            Reviewed and updated as needed this visit by Provider         Review of Systems   Constitutional, HEENT, cardiovascular, pulmonary, gi and gu systems are negative, except as otherwise noted.       Objective   Reported vitals:  LMP 05/31/2016 (Approximate)    healthy, alert and no distress  PSYCH: Alert and oriented times 3; coherent speech, normal   rate and volume, able to articulate logical thoughts, able   to abstract reason, no tangential thoughts, no hallucinations   or delusions  Her affect is normal  RESP: No cough, no audible wheezing, able to talk in full sentences  Remainder of exam unable to be completed due to telephone visits    Diagnostic Test Results:  Labs reviewed in Epic        Assessment/Plan:  1. Hypertension goal BP (blood pressure) < 140/90  BP is elevated per report. Will increase to 25 mg. She wants to keep me posted on how her BPs are running and message me in a few weeks. This prescription is given with a discussion of side effects, risks and proper use.  Instructions are given to follow up if not improving or symptoms change or worsen as discussed.   - hydrochlorothiazide (HYDRODIURIL) 25 MG tablet; Take 1 tablet (25 mg) by mouth daily  Dispense: 90 tablet; Refill: 1    2. Morbid obesity (H)  She's committed and in the action stage for weight loss. She's a little ambivalent at times about how to stay with this plan, but wants to commit to further weight management. Her ultimate goal is weight loss to  help her get off BP medications.       No follow-ups on file.      Phone call duration:  12 minutes    HARPREET Pinto, PA-C

## 2020-05-11 NOTE — PATIENT INSTRUCTIONS
1. Message me in 2 weeks with updated blood pressures  2. Keep up your focus on weight loss! Good work with your personal plan. I think when you are able to stick with the exercise and diet, it always seems to help. So keep it up!

## 2020-06-23 ENCOUNTER — ANCILLARY PROCEDURE (OUTPATIENT)
Dept: MAMMOGRAPHY | Facility: CLINIC | Age: 50
End: 2020-06-23
Attending: OBSTETRICS & GYNECOLOGY
Payer: COMMERCIAL

## 2020-06-23 DIAGNOSIS — Z12.31 VISIT FOR SCREENING MAMMOGRAM: ICD-10-CM

## 2020-06-23 PROCEDURE — 77067 SCR MAMMO BI INCL CAD: CPT

## 2020-08-20 NOTE — NURSING NOTE
"Chief Complaint   Patient presents with     CPAP Follow Up       Initial BP (!) 146/96   Pulse 87   Ht 1.676 m (5' 6\")   Wt 120.2 kg (265 lb)   SpO2 94%   BMI 42.77 kg/m   Estimated body mass index is 42.77 kg/m  as calculated from the following:    Height as of this encounter: 1.676 m (5' 6\").    Weight as of this encounter: 120.2 kg (265 lb).    Medication Reconciliation: complete      " SPIRITUAL HEALTH SERVICES Progress Note  [facility] 715-1    Fellow up with pt, pt is confused but welcomed prayer, read Hemanth [facility] and prayed for pt. SHS will remain available.       Virgil Billings  Chaplain Resident

## 2020-11-13 ENCOUNTER — MYC REFILL (OUTPATIENT)
Dept: FAMILY MEDICINE | Facility: CLINIC | Age: 50
End: 2020-11-13

## 2020-11-13 ENCOUNTER — VIRTUAL VISIT (OUTPATIENT)
Dept: FAMILY MEDICINE | Facility: OTHER | Age: 50
End: 2020-11-13
Payer: COMMERCIAL

## 2020-11-13 DIAGNOSIS — I10 HYPERTENSION GOAL BP (BLOOD PRESSURE) < 140/90: ICD-10-CM

## 2020-11-13 PROCEDURE — 99421 OL DIG E/M SVC 5-10 MIN: CPT | Performed by: NURSE PRACTITIONER

## 2020-11-13 NOTE — PROGRESS NOTES
"Date: 2020 16:35:51  Clinician: Madhavi Hess  Clinician NPI: [id number]  Patient: Janelle Byrd  Patient : 1970  Patient Address: [address] [place] [address]  Patient Phone: [phone]  Visit Protocol: URI  Patient Summary:  Janelle is a 50 year old ( : 1970 ) female who initiated a [facility] Visit for COVID-19 (Coronavirus) evaluation and screening. When asked the question \"Please sign me up to receive news, health information and promotions from [facility].\", Janelle responded \"Yes\".    Janelle states her symptoms started gradually 2-3 weeks ago. After her symptoms started, they improved and then got worse again.   Her symptoms consist of diarrhea, a cough, nasal congestion, and nausea. She is experiencing mild difficulty breathing with activities but can speak normally in full sentences.   Symptom details     Nasal secretions: The color of her mucus is yellow.    Cough: Janelle coughs a few times an hour and her cough is not more bothersome at night. Phlegm comes into her throat when she coughs. She does not believe her cough is caused by post-nasal drip. The color of the phlegm is white, yellow, and clear.      Janelle denies having vomiting, rhinitis, facial pain or pressure, myalgias, chills, malaise, sore throat, teeth pain, ageusia, ear pain, headache, wheezing, fever, and anosmia. She also denies taking antibiotic medication in the past month, having recent facial or sinus surgery in the past 60 days, and having a sinus infection within the past year.   Precipitating events  She has not recently been exposed to someone with influenza. Janelle has not been in close contact with any high risk individuals.   Pertinent COVID-19 (Coronavirus) information  Janelle does not work or volunteer as healthcare worker or a . In the past 14 days, Janelle has not worked or volunteered at a healthcare facility or group living setting.   In the past 14 days, she also has " not lived in a congregate living setting.   Janelle has not had a close contact with a laboratory-confirmed COVID-19 patient within 14 days of symptom onset.    Since December 2019, Janelle has not been tested for COVID-19 and has had upper respiratory infection (URI) or influenza-like illness.      Date(s) of previous URI or influenza-like illness (free-text): October 15-present     Symptoms Janelle experienced during previous URI or influenza-like illness as reported by the patient (free-text): Fever, aches, stuffy nose, cough        Pertinent medical history  Janelle typically gets a yeast infection when she takes antibiotics. She has used fluconazole (Diflucan) to treat previous yeast infections. 2 doses of fluconazole (Diflucan) has typically been needed for symptoms to resolve in the past.  Janelle does not need a return to work/school note.   Weight: 242 lbs   Janelle does not smoke or use smokeless tobacco.   Additional information as reported by the patient (free text): My son was tested for COVID on October 15, same symptoms, and the test came back negative. Today I still have a cough and some congestion. I felt dizzy and short of breath and called 911. I was checked out and the EMT said my heart was okay, oxygen level was 100%. EMT suggested a COVID test.   Weight: 242 lbs    MEDICATIONS: hydrochlorothiazide oral, ALLERGIES: NKDA  Clinician Response:  Dear Janelle,   Your symptoms show that you may have coronavirus (COVID-19). This illness can cause fever, cough and trouble breathing. Many people get a mild case and get better on their own. Some people can get very sick.  What should I do?  We would like to test you for this virus.   1. Please call [phone] to schedule your visit. Explain that you were referred by [facility] to have a COVID-19 test. Be ready to share your [facility] visit ID number.  * If you need to schedule in [facility] please call [phone] or for [employer] employees please call [phone].   "* If you need to schedule in the [place] area please call [phone]. [place] employees call [phone].  The following will serve as your written order for this COVID Test, ordered by me, for the indication of suspected COVID [Z20.828]: The test will be ordered in [employer], our electronic health record, after you are scheduled. It will show as ordered and authorized by Albert Reddy MD.  Order: COVID-19 (Coronavirus) PCR for SYMPTOMATIC testing from Pending sale to [facility].   2. When it's time for your COVID test:  Stay at least 6 feet away from others. (If someone will drive you to your test, stay in the backseat, as far away from the  as you can.)   Cover your mouth and nose with a mask, tissue or washcloth.  Go straight to the testing site. Don't make any stops on the way there or back.      3.Starting now: Stay home and away from others (self-isolate) until:   You've had no fever---and no medicine that reduces fever---for one full day (24 hours). And...   Your other symptoms have gotten better. For example, your cough or breathing has improved. And...   At least 10 days have passed since your symptoms started.       During this time, don't leave the house except for testing or medical care.   Stay in your own room, even for meals. Use your own bathroom if you can.   Stay away from others in your home. No hugging, kissing or shaking hands. No visitors.  Don't go to work, school or anywhere else.    Clean \"high touch\" surfaces often (doorknobs, counters, handles, etc.). Use a household cleaning spray or wipes. You'll find a full list of  on the EPA website: www.epa.gov/pesticide-registration/list-n-disinfectants-use-against-sars-cov-2.   Cover your mouth and nose with a mask, tissue or washcloth to avoid spreading germs.  Wash your hands and face often. Use soap and water.  Caregivers in these groups are at risk for severe illness due to COVID-19:  o People 65 years and older  o People who live in a nursing home or " long-term care facility  o People with chronic disease (lung, heart, cancer, diabetes, kidney, liver, immunologic)  o People who have a weakened immune system, including those who:   Are in cancer treatment  Take medicine that weakens the immune system, such as corticosteroids  Had a bone marrow or organ transplant  Have an immune deficiency  Have poorly controlled HIV or AIDS  Are obese (body mass index of 40 or higher)  Smoke regularly   o Caregivers should wear gloves while washing dishes, handling laundry and cleaning bedrooms and bathrooms.  o Use caution when washing and drying laundry: Don't shake dirty laundry, and use the warmest water setting that you can.  o For more tips, go to www.cdc.gov/coronavirus/2019-ncov/downloads/10Things.pdf.    4.Sign up for [employer]. We know it's scary to hear that you might have COVID-19. We want to track your symptoms to make sure you're okay over the next 2 weeks. Please look for an email from [employer]---this is a free, online program that we'll use to keep in touch. To sign up, follow the link in the email. Learn more at [email or web address]  How can I take care of myself?   Get lots of rest. Drink extra fluids (unless a doctor has told you not to).   Take Tylenol (acetaminophen) for fever or pain. If you have liver or kidney problems, ask your family doctor if it's okay to take Tylenol.   Adults can take either:    650 mg (two 325 mg pills) every 4 to 6 hours, or...   1,000 mg (two 500 mg pills) every 8 hours as needed.    Note: Don't take more than 3,000 mg in one day. Acetaminophen is found in many medicines (both prescribed and over-the-counter medicines). Read all labels to be sure you don't take too much.   For children, check the Tylenol bottle for the right dose. The dose is based on the child's age or weight.    If you have other health problems (like cancer, heart failure, an organ transplant or severe kidney disease): Call your specialty  clinic if you don't feel better in the next 2 days.       Know when to call 911. Emergency warning signs include:    Trouble breathing or shortness of breath Pain or pressure in the chest that doesn't go away Feeling confused like you haven't felt before, or not being able to wake up Bluish-colored lips or face.  Where can I get more information?   [facility] -- About COVID-19: [email or web address]   CDC -- What to Do If You're Sick: www.cdc.gov/coronavirus/2019-ncov/about/steps-when-sick.html   CDC -- Ending Home Isolation: www.cdc.gov/coronavirus/2019-ncov/hcp/disposition-in-home-patients.html   [facility] -- Caring for Someone: www.cdc.gov/coronavirus/2019-ncov/if-you-are-sick/care-for-someone.html   [facility] -- Interim Guidance for Hospital Discharge to Home: www.[facility].mn./diseases/coronavirus/hcp/hospdischarge.pdf   [facility] clinical trials (COVID-19 research studies): clinicalaffairs.[facility]-clinical-trials    Below are the COVID-19 hotlines at the [facility] ([facility]). Interpreters are available.    For health questions: Call 429-139-5168 or 1-108.802.1249 (7 a.m. to 7 p.m.) For questions about schools and childcare: Call 378-608-9177 or 1-110.132.6412 (7 a.m. to 7 p.m.)    Diagnosis: Cough  Diagnosis ICD: R05

## 2020-11-14 ENCOUNTER — AMBULATORY - HEALTHEAST (OUTPATIENT)
Dept: FAMILY MEDICINE | Facility: CLINIC | Age: 50
End: 2020-11-14

## 2020-11-14 DIAGNOSIS — Z20.822 SUSPECTED COVID-19 VIRUS INFECTION: ICD-10-CM

## 2020-11-15 ENCOUNTER — AMBULATORY - HEALTHEAST (OUTPATIENT)
Dept: FAMILY MEDICINE | Facility: CLINIC | Age: 50
End: 2020-11-15

## 2020-11-15 DIAGNOSIS — Z20.822 SUSPECTED COVID-19 VIRUS INFECTION: ICD-10-CM

## 2020-11-17 ENCOUNTER — COMMUNICATION - HEALTHEAST (OUTPATIENT)
Dept: SCHEDULING | Facility: CLINIC | Age: 50
End: 2020-11-17

## 2020-11-17 RX ORDER — HYDROCHLOROTHIAZIDE 25 MG/1
25 TABLET ORAL DAILY
Qty: 90 TABLET | Refills: 1 | Status: SHIPPED | OUTPATIENT
Start: 2020-11-17 | End: 2021-05-04

## 2020-11-17 NOTE — TELEPHONE ENCOUNTER
Routing refill request to provider for review/approval because:   out of range:  blood pressure  Labs not current:  DIPTI Daniel, Na

## 2020-11-22 ENCOUNTER — HEALTH MAINTENANCE LETTER (OUTPATIENT)
Age: 50
End: 2020-11-22

## 2020-11-30 ENCOUNTER — OFFICE VISIT (OUTPATIENT)
Dept: FAMILY MEDICINE | Facility: CLINIC | Age: 50
End: 2020-11-30
Payer: COMMERCIAL

## 2020-11-30 ENCOUNTER — TELEPHONE (OUTPATIENT)
Dept: OTOLARYNGOLOGY | Facility: CLINIC | Age: 50
End: 2020-11-30

## 2020-11-30 VITALS
TEMPERATURE: 97.6 F | OXYGEN SATURATION: 97 % | BODY MASS INDEX: 39.47 KG/M2 | HEART RATE: 74 BPM | SYSTOLIC BLOOD PRESSURE: 128 MMHG | DIASTOLIC BLOOD PRESSURE: 84 MMHG | HEIGHT: 66 IN | WEIGHT: 245.6 LBS

## 2020-11-30 DIAGNOSIS — I10 HYPERTENSION GOAL BP (BLOOD PRESSURE) < 140/90: ICD-10-CM

## 2020-11-30 DIAGNOSIS — E66.01 MORBID OBESITY (H): ICD-10-CM

## 2020-11-30 DIAGNOSIS — R42 DIZZINESS: Primary | ICD-10-CM

## 2020-11-30 DIAGNOSIS — H93.13 TINNITUS, BILATERAL: ICD-10-CM

## 2020-11-30 DIAGNOSIS — G47.33 OSA (OBSTRUCTIVE SLEEP APNEA): Chronic | ICD-10-CM

## 2020-11-30 PROCEDURE — 99214 OFFICE O/P EST MOD 30 MIN: CPT | Performed by: PHYSICIAN ASSISTANT

## 2020-11-30 PROCEDURE — 80048 BASIC METABOLIC PNL TOTAL CA: CPT | Performed by: PHYSICIAN ASSISTANT

## 2020-11-30 ASSESSMENT — MIFFLIN-ST. JEOR: SCORE: 1746.78

## 2020-11-30 NOTE — TELEPHONE ENCOUNTER
[facility] Call Center    Phone Message    May a detailed message be left on voicemail: no     Reason for Call: Appointment Intake    Referring Provider Name: Celso Vasquez PA-C in [facility]  Diagnosis and/or Symptoms: Dizziness    Action Taken: Message routed to:  [facility] ([facility]): CLINIC COORDINATORS-ENT-EYE-DENTAL [[id number]]    Travel Screening: Not Applicable
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
## 2020-12-01 ENCOUNTER — TELEPHONE (OUTPATIENT)
Dept: OTOLARYNGOLOGY | Facility: CLINIC | Age: 50
End: 2020-12-01

## 2020-12-01 LAB
ANION GAP SERPL CALCULATED.3IONS-SCNC: 7 MMOL/L (ref 3–14)
BUN SERPL-MCNC: 12 MG/DL (ref 7–30)
CALCIUM SERPL-MCNC: 9.3 MG/DL (ref 8.5–10.1)
CHLORIDE SERPL-SCNC: 106 MMOL/L (ref 94–109)
CO2 SERPL-SCNC: 27 MMOL/L (ref 20–32)
CREAT SERPL-MCNC: 0.81 MG/DL (ref 0.52–1.04)
GFR SERPL CREATININE-BSD FRML MDRD: 85 ML/MIN/{1.73_M2}
GLUCOSE SERPL-MCNC: 100 MG/DL (ref 70–99)
POTASSIUM SERPL-SCNC: 3.4 MMOL/L (ref 3.4–5.3)
SODIUM SERPL-SCNC: 140 MMOL/L (ref 133–144)

## 2020-12-01 NOTE — TELEPHONE ENCOUNTER
1. Have you noticed any changes in hearing? No  2. Do you have ringing, buzzing, or other sounds in your ears or head, this is also referred to as Tinnitus? Yes  3. When and where was your last hearing test? no  4. Do you feel lightheaded or foggy? Sometimes: sometimes when ringing changes pitch  5. Do you have a spinning sensation? Yes  6. Is there any specific position that can bring on dizziness? Lean over to far  7. Does looking up cause dizziness? No  8. Does getting in and our of bed cause dizziness? No  9. Does turning over in bed increase or cause dizziness? No  10. Does bending over cause dizziness? Yes  11. Is there anything that you can do to prevent the dizziness? Positional exercises  12. Has the dizziness gotten better with time? Yes  13. Have you seen Physical Therapy for dizziness? (Please indicate clinic and as much of the location as possible): No  14. Are you being referred to a specific physician? No  15. Have you been evaluated/treated for your dizziness at any other location?  (If yes,obtian as much clinic/provider/locaiton as possible) No. (If yes answer the following questions:)   Have you seen any ENT, Neurology, or other providers for these symptoms?             No   Have you had any balance or Audiology testing? No Have you had an MRI or CT scan of your head or neck? No    Would you like to receive your Release of Information by mail or e-mail?  e-mail

## 2020-12-02 NOTE — TELEPHONE ENCOUNTER
FUTURE VISIT INFORMATION      FUTURE VISIT INFORMATION:    Date: 2/9/2021    Time: 1:30PM    Location: [facility]  REFERRAL INFORMATION:    Referring provider:  Celso Vasquez PA-C    Referring providers clinic:  [facility]    Reason for visit/diagnosis  Vertigo- Referred by Celso Vasquez PA-C in [facility]/    RECORDS REQUESTED FROM:       Clinic name Comments Records Status Imaging Status   [facility] 11/30/2020 note and referral from Celso Vasquez PA-C Epic                                    12/2/2020 8:27AM sent a message to audiology to review - Amay

## 2020-12-03 DIAGNOSIS — R42 DIZZINESS: Primary | ICD-10-CM
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
## 2020-12-12 ENCOUNTER — OFFICE VISIT (OUTPATIENT)
Dept: ORTHOPEDICS | Facility: CLINIC | Age: 50
End: 2020-12-12
Payer: COMMERCIAL

## 2020-12-12 ENCOUNTER — ANCILLARY PROCEDURE (OUTPATIENT)
Dept: GENERAL RADIOLOGY | Facility: CLINIC | Age: 50
End: 2020-12-12
Attending: PEDIATRICS
Payer: COMMERCIAL

## 2020-12-12 VITALS
WEIGHT: 245 LBS | HEIGHT: 66 IN | DIASTOLIC BLOOD PRESSURE: 78 MMHG | SYSTOLIC BLOOD PRESSURE: 128 MMHG | BODY MASS INDEX: 39.37 KG/M2

## 2020-12-12 DIAGNOSIS — M25.561 ACUTE PAIN OF RIGHT KNEE: Primary | ICD-10-CM

## 2020-12-12 DIAGNOSIS — M25.561 ACUTE PAIN OF RIGHT KNEE: ICD-10-CM

## 2020-12-12 DIAGNOSIS — M17.11 PRIMARY OSTEOARTHRITIS OF RIGHT KNEE: ICD-10-CM

## 2020-12-12 PROCEDURE — 73562 X-RAY EXAM OF KNEE 3: CPT | Mod: RT | Performed by: RADIOLOGY

## 2020-12-12 PROCEDURE — 99204 OFFICE O/P NEW MOD 45 MIN: CPT | Performed by: PEDIATRICS

## 2020-12-12 ASSESSMENT — MIFFLIN-ST. JEOR: SCORE: 1748.06

## 2020-12-12 NOTE — PROGRESS NOTES
Sports Medicine Clinic Visit    PCP: Clinic, [facility]    Janelle Abbi Byrd is a 50 year old female who is seen  as a self referral presenting with right knee pain.  Pop in her knee on 12/9/20.  Happened right before bed, iced and was able to sleep.  Woke up with more pain in her knee and difficulty walking.  Has continued to have pain and has not been able to do stairs for the last few days.  Has been using a knee brace.   HX of medial meniscectomy. 12/2/16, Dr Cortes.  Pain with pivoting, standing on toes.  Has been using ice and ibuprofen which has been helpful.      Injury: squatting   **  Noted noise at knee with this episode, noted medial knee pain. Tried to walk it off. Went to bed that night. Next morning had issues with doing stairs.   Tried hinge brace, but some medial knee pain from pressure.  A little better today.  + medial swelling. Also occasional clicking with walking. Limping sometimes.    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    The patient's past medical and surgical history, social history, family history, problem list, and medication list has been reviewed and is as noted above and in chart.    Past Medical History:   Diagnosis Date     Arthritis     Hands, feet, and knees     Complex tear of medial meniscus of right knee as current injury, initial encounter 10/25/2016     Low back pain 12/5/2014      Nephrolithiasis 2000     S/P meniscectomy 2016     Skin cancer 2001    Basal cell carcinoma on nose     TRANS HYPERTEN-ANTEPART 5/10/2006     Umbilical discharge 2014     Past Surgical History:   Procedure Laterality Date     ARTHROSCOPY SHOULDER  10/2004    LT/to remove calcium deposits     ARTHROSCOPY SHOULDER DECOMPRESSION Left 2014    Procedure: ARTHROSCOPY SHOULDER DECOMPRESSION;  Surgeon: Julien Maciel MD;  Location: [facility] OR     BIOPSY  2001    skin cancer from nose      SECTION  2006    , Low Transverse      SECTION  2008     COLONOSCOPY  2016    Normal     DILATION AND CURETTAGE, OPERATIVE HYSTEROSCOPY WITH MORCELLATOR, COMBINED N/A 2016    Procedure: COMBINED DILATION AND CURETTAGE, OPERATIVE HYSTEROSCOPY WITH MORCELLATOR;  Surgeon: Idalia Mcarthur MD;  Location: [facility] OR     Family History   Problem Relation Age of Onset     Lipids Mother      Arthritis Mother      Psychotic Disorder Mother      Respiratory Mother         copd     Circulatory Mother         BLOOD CLOTS     Genitourinary Problems Mother      Heart Failure Mother      Depression Mother      Mental Illness Mother         Bi-polar     Obesity Mother      Lipids Father      Alcohol/Drug Father      Arthritis Father      C.A.D. Father      Cancer - colorectal Father      Hypertension Father      Cerebrovascular Disease Father         10/2018     Colon Cancer Father      Substance Abuse Father      Anesthesia Reaction Father      Lung Cancer Father      Heart Disease Maternal Grandmother      Breast Cancer Maternal Grandmother      Cerebrovascular Disease Maternal Grandmother      Alcohol/Drug Maternal Grandfather      Cerebrovascular Disease Maternal Grandfather      Alcohol/Drug Paternal Grandmother      Hypertension Paternal Grandmother      C.A.D. Paternal Grandfather      Gastrointestinal Disease Paternal Grandfather         liver diease     Alcohol/Drug Paternal Grandfather       Cerebrovascular Disease Paternal Grandfather      Circulatory Sister         BLOOD CLOTS     Diabetes Brother      Diabetes Sister      Social History     Socioeconomic History     Marital status:      Spouse name: Calvin     Number of children: 3     Years of education: Not on file     Highest education level: Not on file   Occupational History     Occupation: student      Employer: [employer]     Comment: writing instructor   Social Needs     Financial resource strain: Not on file     Food insecurity     Worry: Not on file     Inability: Not on file     Transportation needs     Medical: Not on file     Non-medical: Not on file   Tobacco Use     Smoking status: Never Smoker     Smokeless tobacco: Never Used   Substance and Sexual Activity     Alcohol use: Yes     Comment: 1-2 times per month     Drug use: No     Sexual activity: Yes     Partners: Male     Birth control/protection: Post-menopausal, Female Surgical     Comment: Tubal ligation 11/08/2008.   Lifestyle     Physical activity     Days per week: Not on file     Minutes per session: Not on file     Stress: Not on file   Relationships     Social connections     Talks on phone: Not on file     Gets together: Not on file     Attends [employer]ism service: Not on file     Active member of club or organization: Not on file     Attends meetings of clubs or organizations: Not on file     Relationship status: Not on file     Intimate partner violence     Fear of current or ex partner: Not on file     Emotionally abused: Not on file     Physically abused: Not on file     Forced sexual activity: Not on file   Other Topics Concern     Parent/sibling w/ CABG, MI or angioplasty before 65F 55M? Yes     Comment: Father   Social History Narrative    Caffeine intake/servings daily - 1    Calcium intake/servings daily - 2-3    Exercise 2 times weekly - describe walking, treadmill, running    Sunscreen used - Yes    Seatbelts used - Yes    Guns stored in  "the home - No    Self Breast Exam - Yes    Pap test up to date -  Yes, as of today, 6/7/2010 Normal    Eye exam up to date -  Yes    Dental exam up to date -  Yes    DEXA scan up to date -  Not Applicable    Flex Sig/Colonoscopy up to date -  Not Applicable    Mammography up to date -  Not Applicable    Immunizations reviewed and up to date - Yes, tetanus 3/9/2009    Abuse: Current or Past (Physical, Sexual or Emotional) - No    Do you feel safe in your environment - Yes    Do you cope well with stress - Yes    Do you suffer from insomnia - Yes, staying asleep    Last updated by: Amalia Perez CMA 8/29/2011                                         Objective  /78   Ht 1.676 m (5' 6\")   Wt 111.1 kg (245 lb)   LMP 05/31/2016 (Approximate)   BMI 39.54 kg/m      GENERAL APPEARANCE: healthy, alert, no distress and over weight   GAIT: slight antalgic  SKIN: no suspicious lesions or rashes  NEURO: Normal strength and tone, mentation intact and speech normal  PSYCH:  mentation appears normal and affect normal/bright  HEENT: no scleral icterus  CV: distal perfusion intact  RESP: nonlabored breathing      Right Knee exam    ROM:      Lacking few degrees end range flexion, extension    Patellar Motion:      Crepitus noted in the patellofemoral joint mild    Tender:      medial joint line    Non Tender:       remainder of knee area    Special Tests:      neg (-) Samantha       neg (-) Lachman       neg (-) anterior drawer       neg (-) posterior drawer       neg (-) varus for laxity, + medial pain       neg (-) valgus       Stiffness per pt with forced extension    trace effusion  no ecchymosis      Radiology  Visualized radiographs as noted below, and reviewed the images with the patient; if the report was available at the time of the visit, the report was reviewed as well.  Bilateral knee DJD.    Recent Results (from the past 24 hour(s))   XR Knee Right 3 Views    Narrative    XR KNEE RT 3 VW 12/12/2020 8:44 AM "     HISTORY: Acute pain of right knee      Impression    IMPRESSION: Bilateral medial compartment moderate joint space  narrowing. Otherwise unremarkable exam.    LEROY BENITEZ MD         Assessment:  1. Acute pain of right knee    2. Primary osteoarthritis of right knee        Plan:  Discussed the assessment with the patient.  Discussed nature of degenerative arthrosis of the knee. Discussed symptom treatment with over-the-counter medications, ice or heat, topical treatments, and rest if needed. Discussed use of compression or bracing for comfort. Discussed potential benefits of rehabilitation, to maintain or improve function at the knee. Discussed benefits of exercise and weight loss (if applicable) to reduce pressure at the knee. Discussed injection therapy. Also briefly discussed future consideration of referral to orthopedic surgery for further evaluation and discussion of additional treatment options.  She primarily prefers to monitor for now. Ibuprofen as needed.  Follow up: will leave open ended.  Questions answered. Discussed signs and symptoms that may indicate more serious issues; the patient was instructed to seek appropriate care if noted. Tova indicates understanding of these issues and agrees with the plan.      Nik Saul, DO, CAQ          Patient Instructions   Icing, heat, over-the-counter medication such as ibuprofen as needed.  Modify activities accordingly, for pain.  X-rays reviewed today, demonstrating degenerative change in both knees.  No additional imaging is required currently.  We discussed role of rehab; work on home exercises from previous physical therapy.  If interested in return to PT, contact clinic.  We also discussed potential trial of a medial  brace, which would be done through physical therapy.  This type of brace can benefit patients who have underlying arthritis in the knee.  If interested, contact clinic.  We also discussed consideration of a steroid  injection.  Hold for now with some improvement, and past history with injections.  Will leave follow-up open-ended.  Contact clinic if questions/concerns.    If you have any further questions for your physician or physician s care team you can call [phone] and use option 3 to leave a voice message. Calls received during business hours will be returned same day.        This note consists of symbols derived from keyboarding, dictation and/or voice recognition software. As a result, there may be errors in the script that have gone undetected. Please consider this when interpreting information found in this chart.

## 2020-12-12 NOTE — LETTER
12/12/2020         RE: Janelle Bydr  [address] [place] [address]        Dear Colleague,    Thank you for referring your patient, Janelle Byrd, to the [facility] [place]. Please see a copy of my visit note below.    Sports Medicine Clinic Visit    PCP: Clinic, [facility]    Janelle Byrd is a 50 year old female who is seen  as a self referral presenting with right knee pain.  Pop in her knee on 12/9/20.  Happened right before bed, iced and was able to sleep.  Woke up with more pain in her knee and difficulty walking.  Has continued to have pain and has not been able to do stairs for the last few days.  Has been using a knee brace.   HX of medial meniscectomy. 12/2/16, Dr Cortes.  Pain with pivoting, standing on toes.  Has been using ice and ibuprofen which has been helpful.      Injury: squatting   **  Noted noise at knee with this episode, noted medial knee pain. Tried to walk it off. Went to bed that night. Next morning had issues with doing stairs.   Tried hinge brace, but some medial knee pain from pressure.  A little better today.  + medial swelling. Also occasional clicking with walking. Limping sometimes.    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    The patient's past medical and surgical history, social history, family history, problem  list, and medication list has been reviewed and is as noted above and in chart.    Past Medical History:   Diagnosis Date     Arthritis     Hands, feet, and knees     Complex tear of medial meniscus of right knee as current injury, initial encounter 10/25/2016     Low back pain 2014     Nephrolithiasis 2000     S/P meniscectomy 2016     Skin cancer 2001    Basal cell carcinoma on nose     TRANS HYPERTEN-ANTEPART 5/10/2006     Umbilical discharge 2014     Past Surgical History:   Procedure Laterality Date     ARTHROSCOPY SHOULDER  10/2004    LT/to remove calcium deposits     ARTHROSCOPY SHOULDER DECOMPRESSION Left 2014    Procedure: ARTHROSCOPY SHOULDER DECOMPRESSION;  Surgeon: Julien Maciel MD;  Location: [facility] OR     BIOPSY  2001    skin cancer from nose      SECTION  2006    , Low Transverse      SECTION  2008     COLONOSCOPY  2016    Normal     DILATION AND CURETTAGE, OPERATIVE HYSTEROSCOPY WITH MORCELLATOR, COMBINED N/A 2016    Procedure: COMBINED DILATION AND CURETTAGE, OPERATIVE HYSTEROSCOPY WITH MORCELLATOR;  Surgeon: Idalia Mcarthur MD;  Location: [facility]     Family History   Problem Relation Age of Onset     Lipids Mother      Arthritis Mother      Psychotic Disorder Mother      Respiratory Mother         copd     Circulatory Mother         BLOOD CLOTS     Genitourinary Problems Mother      Heart Failure Mother      Depression Mother      Mental Illness Mother         Bi-polar     Obesity Mother      Lipids Father      Alcohol/Drug Father      Arthritis Father      C.A.D. Father      Cancer - colorectal Father      Hypertension Father      Cerebrovascular Disease Father         10/2018     Colon Cancer Father      Substance Abuse Father      Anesthesia Reaction Father      Lung Cancer Father      Heart Disease Maternal Grandmother      Breast Cancer Maternal Grandmother      Cerebrovascular Disease Maternal Grandmother      Alcohol/Drug  Maternal Grandfather      Cerebrovascular Disease Maternal Grandfather      Alcohol/Drug Paternal Grandmother      Hypertension Paternal Grandmother      C.A.D. Paternal Grandfather      Gastrointestinal Disease Paternal Grandfather         liver diease     Alcohol/Drug Paternal Grandfather      Cerebrovascular Disease Paternal Grandfather      Circulatory Sister         BLOOD CLOTS     Diabetes Brother      Diabetes Sister      Social History     Socioeconomic History     Marital status:      Spouse name: Calvin     Number of children: 3     Years of education: Not on file     Highest education level: Not on file   Occupational History     Occupation: student      Employer: [employer]     Comment: writing instructor   Social Needs     Financial resource strain: Not on file     Food insecurity     Worry: Not on file     Inability: Not on file     Transportation needs     Medical: Not on file     Non-medical: Not on file   Tobacco Use     Smoking status: Never Smoker     Smokeless tobacco: Never Used   Substance and Sexual Activity     Alcohol use: Yes     Comment: 1-2 times per month     Drug use: No     Sexual activity: Yes     Partners: Male     Birth control/protection: Post-menopausal, Female Surgical     Comment: Tubal ligation 11/08/2008.   Lifestyle     Physical activity     Days per week: Not on file     Minutes per session: Not on file     Stress: Not on file   Relationships     Social connections     Talks on phone: Not on file     Gets together: Not on file     Attends Faith service: Not on file     Active member of club or organization: Not on file     Attends meetings of clubs or organizations: Not on file     Relationship status: Not on file     Intimate partner violence     Fear of current or ex partner: Not on file     Emotionally abused: Not on file     Physically abused: Not on file     Forced sexual activity: Not on file   Other Topics Concern     Parent/sibling w/ CABG, MI  "or angioplasty before 65F 55M? Yes     Comment: Father   Social History Narrative    Caffeine intake/servings daily - 1    Calcium intake/servings daily - 2-3    Exercise 2 times weekly - describe walking, treadmill, running    Sunscreen used - Yes    Seatbelts used - Yes    Guns stored in the home - No    Self Breast Exam - Yes    Pap test up to date -  Yes, as of today, 6/7/2010 Normal    Eye exam up to date -  Yes    Dental exam up to date -  Yes    DEXA scan up to date -  Not Applicable    Flex Sig/Colonoscopy up to date -  Not Applicable    Mammography up to date -  Not Applicable    Immunizations reviewed and up to date - Yes, tetanus 3/9/2009    Abuse: Current or Past (Physical, Sexual or Emotional) - No    Do you feel safe in your environment - Yes    Do you cope well with stress - Yes    Do you suffer from insomnia - Yes, staying asleep    Last updated by: Amalia Perez [facility] 8/29/2011                                         Objective  /78   Ht 1.676 m (5' 6\")   Wt 111.1 kg (245 lb)   LMP 05/31/2016 (Approximate)   BMI 39.54 kg/m      GENERAL APPEARANCE: healthy, alert, no distress and over weight   GAIT: slight antalgic  SKIN: no suspicious lesions or rashes  NEURO: Normal strength and tone, mentation intact and speech normal  PSYCH:  mentation appears normal and affect normal/bright  HEENT: no scleral icterus  CV: distal perfusion intact  RESP: nonlabored breathing      Right Knee exam    ROM:      Lacking few degrees end range flexion, extension    Patellar Motion:      Crepitus noted in the patellofemoral joint mild    Tender:      medial joint line    Non Tender:       remainder of knee area    Special Tests:      neg (-) Samantha       neg (-) Lachman       neg (-) anterior drawer       neg (-) posterior drawer       neg (-) varus for laxity, + medial pain       neg (-) valgus       Stiffness per pt with forced extension    trace effusion  no ecchymosis      Radiology  Visualized radiographs " as noted below, and reviewed the images with the patient; if the report was available at the time of the visit, the report was reviewed as well.  Bilateral knee DJD.    Recent Results (from the past 24 hour(s))   XR Knee Right 3 Views    Narrative    XR KNEE RT 3 VW 12/12/2020 8:44 AM     HISTORY: Acute pain of right knee      Impression    IMPRESSION: Bilateral medial compartment moderate joint space  narrowing. Otherwise unremarkable exam.    LEROY BENITEZ MD         Assessment:  1. Acute pain of right knee    2. Primary osteoarthritis of right knee        Plan:  Discussed the assessment with the patient.  Discussed nature of degenerative arthrosis of the knee. Discussed symptom treatment with over-the-counter medications, ice or heat, topical treatments, and rest if needed. Discussed use of compression or bracing for comfort. Discussed potential benefits of rehabilitation, to maintain or improve function at the knee. Discussed benefits of exercise and weight loss (if applicable) to reduce pressure at the knee. Discussed injection therapy. Also briefly discussed future consideration of referral to orthopedic surgery for further evaluation and discussion of additional treatment options.  She primarily prefers to monitor for now. Ibuprofen as needed.  Follow up: will leave open ended.  Questions answered. Discussed signs and symptoms that may indicate more serious issues; the patient was instructed to seek appropriate care if noted. Tova indicates understanding of these issues and agrees with the plan.      Nik Saul, DO, CAQ          Patient Instructions   Icing, heat, over-the-counter medication such as ibuprofen as needed.  Modify activities accordingly, for pain.  X-rays reviewed today, demonstrating degenerative change in both knees.  No additional imaging is required currently.  We discussed role of rehab; work on home exercises from previous physical therapy.  If interested in return to PT, contact  clinic.  We also discussed potential trial of a medial  brace, which would be done through physical therapy.  This type of brace can benefit patients who have underlying arthritis in the knee.  If interested, contact clinic.  We also discussed consideration of a steroid injection.  Hold for now with some improvement, and past history with injections.  Will leave follow-up open-ended.  Contact clinic if questions/concerns.    If you have any further questions for your physician or physician s care team you can call [phone] and use option 3 to leave a voice message. Calls received during business hours will be returned same day.        This note consists of symbols derived from keyboarding, dictation and/or voice recognition software. As a result, there may be errors in the script that have gone undetected. Please consider this when interpreting information found in this chart.          Again, thank you for allowing me to participate in the care of your patient.        Sincerely,        Nik Saul, DO

## 2020-12-12 NOTE — PATIENT INSTRUCTIONS
Icing, heat, over-the-counter medication such as ibuprofen as needed.  Modify activities accordingly, for pain.  X-rays reviewed today, demonstrating degenerative change in both knees.  No additional imaging is required currently.  We discussed role of rehab; work on home exercises from previous physical therapy.  If interested in return to PT, contact clinic.  We also discussed potential trial of a medial  brace, which would be done through physical therapy.  This type of brace can benefit patients who have underlying arthritis in the knee.  If interested, contact clinic.  We also discussed consideration of a steroid injection.  Hold for now with some improvement, and past history with injections.  Will leave follow-up open-ended.  Contact clinic if questions/concerns.    If you have any further questions for your physician or physician s care team you can call [phone] and use option 3 to leave a voice message. Calls received during business hours will be returned same day.

## 2020-12-14 ENCOUNTER — TELEPHONE (OUTPATIENT)
Dept: AUDIOLOGY | Facility: CLINIC | Age: 50
End: 2020-12-14

## 2021-01-19 NOTE — PROGRESS NOTES
"AUDIOLOGY REPORT-BALANCE ASSESSMENT    SUBJECTIVE: Janelle Byrd, 50 year old, was seen in Audiology at the [facility] on 1/22/2021, for videonystagmography (VNG) and rotational chair testing referred by Rick Nissen, M.D. Patient presents for continued evaluation of her vestibular system following electrocochleography (ECochG) testing.     Patient's case history obtained by Dora Mcgregor earlier today is as follows: \"The patient reports concern for severe bilateral tinnitus and episodic dizziness. The patient reports that she first developed tinnitus at the age of 8-9 years old while watching a car race; she describes that she felt sudden right otalgia and that there has been the presence of constant bilateral symmetric \"whooshing\" or \"radio static\" tinnitus since that time that does sometimes shift in pitch, most notably in the right ear.The patient reports that her tinnitus has been manageable since onset by playing background noise (like a fan) at night when trying to sleep.      The patient reports her first instance of dizziness approximately four years ago when after she was looking down at her laptop all day and then suddenly looked up. The patient described the sensation of spinning that lasted for 3-4 days. The patient reports that she saw a primary care physician regarding this who preformed a \"positioning\" exercise on her that did help to stop the dizziness. The patient reports minimal issues with dizziness until October of 2020 when she again experienced a spinning sensation. She notes that she was diagnosed with middle ear fluid in the right ear around the same time. The patient reports a history of ear infections as a child and notes she did have one ear infection as an adult, around 2004. The patient confirms that she has never had surgical intervention regarding this.      The patient reports that since October 2020 she has had " "smaller episodes of dizziness that can be illicited by leaning forward or by others moving her (her family members rocking her chair, for example). The patient reports that the \"positioning\" techniques taught to her years ago help to minimize symptoms as well as holding her head straight and still. The patient reports another fairly significant episode of dizziness approximately one week ago.     The patient reports that episodically she feels \"fluttering\" in the right ear when others are talking, but she denies any other ear related issues or hearing loss. The patient confirms that her tinnitus is still present but that it does not seem to become more severe when she is feeling dizzy. She does feel that the tinnitus has grown more severe overall recently. The patient also reports a history of sinus infections that due cause ear symptoms when present. The patient also notes some episodic jaw pain on the right, and she reports that there is a history of jaw arthritis in her family. The patient reports that both of her parents and two siblings have developed hearing loss. The patient also reports that her sister is having issues with dizziness currently, but this is assumed to be due to a medication. The patient reports that she suffers from approximately one migraine per year and that over the counter medications do help symptoms; she states that the migraines are associated with neck issues. The patient reports slightly high blood pressure that is well controlled with medication. She has had basil cell skin cancer removed in 2001 but denies any other cancer diagnosis or chemotherapy treatment. The patient reports allergies to the following for which she takes Zyrtec as needed: mold, dust, pet hair.      The patient denies a history of head trauma, dizziness with loud or sudden sounds, autophonia, dizziness with exertion or noises with eye movement or blinking.\" Patient denies history of eye surgeries. Patient denies " consumption of caffeinated beverages, nicotine, alcoholic substances, or use of medications with known vestibular interactions within the past 48 hours.    OBJECTIVE:  Rotational chair testing:  Sinusoidal harmonic acceleration test: 0.01, 0.02, 0.04, 0.08, 0.16, 0.32 and 0.64 Hz.   Spontaneous nystagmus: Absent  Phase: Normal phase (0.01 Hz) sloping to borderline-mild reduced phase lead (0.02-0.04 Hz) rising to normal phase lead (0.08-0.64 Hz).   Gain: Normal to borderline-mild reduced gain at 0.04-0.08 Hz (may be due to difficulty tasking).   Symmetry: Normal  Spectral Purity: Good  Overall rotational chair test: Normal to borderline-mild reduced gain at 0.04-0.08 Hz (may be due to difficulty tasking), normal phase (0.01 Hz) sloping to borderline-mild reduced phase lead (0.02-0.04 Hz) rising to normal phase lead (0.08-0.64 Hz), normal symmetry and good spectral purity; pattern suggests central influences.    Videonystagmography (VNG) testing:  Prescreening:  Tympanograms: Normal eardrum mobility bilaterally. Note: this test is completed to determine the status of the middle ear before irrigations are completed. *Please see tympanograms obtained during electrocochleography (EcochG) testing performed earlier today.   Ocular range of motion and ocular counter roll: Normal  Cross/cover: Normal  Head Thrust: Negative     Nystagmus Tests:  Gaze-Horizontal with Fixation:  *mild saccadic intrusions throughout.    Center: Normal   Right: Normal   Left: Normal  Gaze-Vertical with Fixation:  *mild saccadic intrusions throughout.    Up: Normal   Down: Normal  Gaze with Fixation Denied  *mild saccadic intrusions throughout.    Center: Normal   Right: few 2 deg/s right beats, likely endpoint/non-significant.   Left: Normal   Up: Normal  High Frequency Headshake:   Horizontal: Positive. 3 deg/s right beating nystagmus, patient reports mild dizziness post headshake.   Vertical: Negative. 1 deg/s left beating nystagmus, patient  reports mild dizziness post headshake.     [place]-Hallpike Head Right: Negative for PC BPPV. Patient reports momentary mild dizziness upon first transitioning to supine position, no nystagmus.   [place]pike Head Left: Negative for PC BPPV. No nystagmus or symptoms.   Roll Test Head Right: Negative for HC BPPV. No nystagmus or symptoms.   Roll Test Head Left: Negative for HC BPPV. No nystagmus or symptoms.     Positional Testing:  Positionals: Supine: No nystagmus, patient reports mild dizziness.   Positionals: Body Right: intermittent 1-2 deg/s left beating nystagmus, suppresses with fixation, patient reports mild dizziness.   Positionals: Body Left: No nystagmus, patient reports minimal dizziness.   Positionals: Pre-Caloric: Normal    Oculomotor Tests:  Saccades (repeatable): Borderline abnormal with mild saccadic intrusions throughout.   Anti-saccades: Normal, patient can complete task.  Pursuit (repeatable): Abnormal with mild saccadic intrusions throughout.     Calorics:  (Tested at 44 degrees and 30 degrees Celsius for 30 seconds for warm and cool water, respectively):  Right Warm Eye Speed: 8 degrees per second right beating  Left Warm Eye Speed: 14 degrees per second left beating  Right Cool Eye Speed: 16 degrees per second left beating  Left Cool Eye Speed: 23 degrees per second right beating  Difference between ear: 21% right hypofunction. (Greater than 25% considered clinically significant.)  Fixation Index: 0.15 Normal  Overall caloric test: Normal    ASSESSMENT:  1. Indications of central vestibular system involvement noted on today's exam were as follows:     -Abnormal Pursuit testing and Borderline Abnormal Saccade testing; repeatable.     -Rotary Chair testing; pattern suggests central influences.     2. There were no significant indications of peripheral vestibular system involvement noted on today's exam.     3. Non-localizing findings:    -Positive High Frequency Headshake; in the absence of a  significant hypofunction, this is a non-localizing finding.    PLAN:  Patient may benefit from a referral to vestibular physical therapy for symptom reduction and management pending further review by the referring provider. Proceed with hearing evaluation on 2/9/2021. Follow-up with Dr. Rick Nissen regarding today's results and for medical management on 2/9/2021. Please call this clinic at [phone] with questions regarding these results or recommendations.       Jaye Garcias. CCC-A  Licensed Audiologist   [place] #[id number]

## 2021-01-22 ENCOUNTER — OFFICE VISIT (OUTPATIENT)
Dept: AUDIOLOGY | Facility: CLINIC | Age: 51
End: 2021-01-22
Payer: COMMERCIAL

## 2021-01-22 ENCOUNTER — OFFICE VISIT (OUTPATIENT)
Dept: AUDIOLOGY | Facility: CLINIC | Age: 51
End: 2021-01-22
Attending: OTOLARYNGOLOGY
Payer: COMMERCIAL

## 2021-01-22 DIAGNOSIS — R42 DIZZINESS AND GIDDINESS: Primary | ICD-10-CM

## 2021-01-22 DIAGNOSIS — R42 DIZZINESS: Primary | ICD-10-CM

## 2021-01-22 PROCEDURE — 92541 SPONTANEOUS NYSTAGMUS TEST: CPT | Performed by: AUDIOLOGIST

## 2021-01-22 PROCEDURE — 92567 TYMPANOMETRY: CPT | Performed by: AUDIOLOGIST

## 2021-01-22 PROCEDURE — 92545 OSCILLATING TRACKING TEST: CPT | Mod: 59 | Performed by: AUDIOLOGIST

## 2021-01-22 PROCEDURE — 92584 ELECTROCOCHLEOGRAPHY: CPT | Performed by: AUDIOLOGIST

## 2021-01-22 PROCEDURE — 92542 POSITIONAL NYSTAGMUS TEST: CPT | Mod: 59 | Performed by: AUDIOLOGIST

## 2021-01-22 PROCEDURE — 92546 SINUSOIDAL ROTATIONAL TEST: CPT | Performed by: AUDIOLOGIST

## 2021-01-22 PROCEDURE — 92537 CALORIC VSTBLR TEST W/REC: CPT | Performed by: AUDIOLOGIST

## 2021-01-22 NOTE — PROGRESS NOTES
"AUDIOLOGY REPORT    BACKGROUND INFORMATION: Janelle Byrd was seen in Audiology at the [facility] on 1/22/2021 for an electrocochleography (ECochG) evaluation, referred by Dr Rick Nissen, MD. The patient reports concern for severe bilateral tinnitus and episodic dizziness. The patient reports that she first developed tinnitus at the age of 8-9 years old while watching a car race; she describes that she felt sudden right otalgia and that there has been the presence of constant bilateral symmetric \"wooshing\" or \"radio static\" tinnitus since that time that does sometimes shift in pitch, most notably in the right ear.The patient reports that her tinnitus has been manageable since onset by playing background noise (like a fan) at night when trying to sleep.     The patient reports her first instance of dizziness approximately four years ago when after she was looking down at her laptop all day and then suddenly looked up. The patient described the sensation of spinning that lasted for 3-4 days. The patient reports that she saw a primary care physician regarding this who preformed a \"positioning\" exercise on her that did help to stop the dizziness. The patient reports minimal issues with dizziness until October of 2020 when she again experienced a spinning sensation. She notes that she was diagnosed with middle ear fluid in the right ear around the same time. The patient reports a history of ear infections as a child and notes she did have one ear infection as an adult, around 2004. The patient confirms that she has never had surgical intervention regarding this.     The patient reports that since October 2020 she has had smaller episodes of dizziness that can be illicited by leaning forward or by others moving her (her family members rocking her chair, for example). The patient reports that the \"positioning\" techniques taught to her years ago help to " "minimize symptoms as well as holding her head straight and still. The patient reports another fairly significant episode of dizziness approximately one week ago.    The patient reports that episodically she feels \"fluttering\" in the right ear when others are talking, but she denies any other ear related issues or hearing loss. The patient confirms that her tinnitus is still present but that it does not seem to become more severe when she is feeling dizzy. She does feel that the tinnitus has grown more severe overall recently. The patient also reports a history of sinus infections that due cause ear symptoms when present. The patient also notes some episodic jaw pain on the right, and she reports that there is a history of jaw arthritis in her family. The patient reports that both of her parents and two siblings have developed hearing loss. The patient also reports that her sister is having issues with dizziness currently, but this is assumed to be due to a medication. The patient reports that she suffers from approximately one migraine per year and that over the counter medications do help symptoms; she states that the migraines are associated with neck issues. The patient reports slightly high blood pressure that is well controlled with medication. She has had basil cell skin cancer removed in 2001 but denies any other cancer diagnosis or chemotherapy treatment. The patient reports allergies to the following for which she takes Zyrtec as needed: mold, dust, pet hair.     The patient denies a history of head trauma, dizziness with loud or sudden sounds, autophonia, dizziness with exertion or noises with eye movement or blinking.    TEST RESULTS AND PROCEDURES:   Abuse Screening:  Do you feel unsafe at home or work/school? No  Do you feel threatened by someone? No  Does anyone try to keep you from having contact with others, or doing things outside of your home? No  Physical signs of abuse present? " No    Electrocochleography (ECochG) testing is performed using an auditory evoked potentials system.  Surface electrodes are placed behind the test ear with extratympanic tymptrode placed in the test ear in order to obtain a near-field recording that measures the response of the cochlear hair cells and auditory nerve in response to auditory stimuli. The measured response consists of the summating potential (SP) and the cochlear nerve action potential (AP). Abnormalities may take the form of an increased summating potential/compound action potential (SP/AP) ratio (due to an increased summating potential) in patients suspected of Meniere's disease (endolymphatic hydrops) or in those patients who have symptoms of vestibular dysfunction or ear symptoms including asymmetric or fluctuating hearing loss, tinnitus and/or aural fullness. The following can be suggestive of an abnormal EcochG: SP/AP ratio exceeds 0.43.  Tympanograms showed normal eardrum mobility bilaterally. Using a microscope tympanic membranes were visualized.       A two-channel ECochG recording was performed for clicks bilaterally.  Clicks for the right ear showed normal SP/AP ratios.    Clicks for the left ear showed normal SP/AP ratios.         Click SP/AP ratio   Right ear  .113   Left ear  .122     Abrnormal SP/AP ratios must be greater than .43 for clicks.     SUMMARY AND RECOMMENDATIONS: Today s ECochG revealed normal SP/AP ratios.  Please call this clinic with questions regarding today s results.  Follow-up with Dr Rick Nissen for medical management.    Jaye Morfin.  Licensed Audiologist  [place] #[id number]

## 2021-02-05 NOTE — PATIENT INSTRUCTIONS
1. You were seen in the ENT Clinic today by Dr. Nissen.  If you have any questions or concerns after your appointment, please call   - Option 1: ENT Clinic: [phone]   - Option 2: Josey (Dr. Nissen's Nurse): [phone]    2.   Plan to return to clinic as needed    3. Vestibular PT    Josey Merino LPN  [facility] - Otolaryngology

## 2021-02-08 DIAGNOSIS — R42 DIZZINESS: Primary | ICD-10-CM

## 2021-02-09 ENCOUNTER — PRE VISIT (OUTPATIENT)
Dept: OTOLARYNGOLOGY | Facility: CLINIC | Age: 51
End: 2021-02-09

## 2021-02-09 ENCOUNTER — OFFICE VISIT (OUTPATIENT)
Dept: AUDIOLOGY | Facility: CLINIC | Age: 51
End: 2021-02-09
Attending: OTOLARYNGOLOGY
Payer: COMMERCIAL

## 2021-02-09 ENCOUNTER — OFFICE VISIT (OUTPATIENT)
Dept: OTOLARYNGOLOGY | Facility: CLINIC | Age: 51
End: 2021-02-09
Payer: COMMERCIAL

## 2021-02-09 VITALS
HEART RATE: 71 BPM | WEIGHT: 246.91 LBS | SYSTOLIC BLOOD PRESSURE: 135 MMHG | DIASTOLIC BLOOD PRESSURE: 93 MMHG | RESPIRATION RATE: 19 BRPM | BODY MASS INDEX: 39.68 KG/M2 | OXYGEN SATURATION: 96 % | TEMPERATURE: 97.1 F | HEIGHT: 66 IN

## 2021-02-09 DIAGNOSIS — H93.13 TINNITUS, BILATERAL: ICD-10-CM

## 2021-02-09 DIAGNOSIS — H61.23 EXCESSIVE CERUMEN IN BOTH EAR CANALS: ICD-10-CM

## 2021-02-09 DIAGNOSIS — R42 DIZZINESS: Primary | ICD-10-CM

## 2021-02-09 DIAGNOSIS — R42 DIZZINESS: ICD-10-CM

## 2021-02-09 PROCEDURE — 92557 COMPREHENSIVE HEARING TEST: CPT | Performed by: AUDIOLOGIST

## 2021-02-09 PROCEDURE — 99203 OFFICE O/P NEW LOW 30 MIN: CPT | Mod: 25 | Performed by: OTOLARYNGOLOGY

## 2021-02-09 PROCEDURE — 92550 TYMPANOMETRY & REFLEX THRESH: CPT | Performed by: AUDIOLOGIST

## 2021-02-09 PROCEDURE — 92504 EAR MICROSCOPY EXAMINATION: CPT | Performed by: OTOLARYNGOLOGY

## 2021-02-09 ASSESSMENT — MIFFLIN-ST. JEOR: SCORE: 1756.75

## 2021-02-09 ASSESSMENT — PAIN SCALES - GENERAL: PAINLEVEL: NO PAIN (0)

## 2021-02-09 NOTE — PROGRESS NOTES
AUDIOLOGY REPORT    SUMMARY: Audiology visit completed. See audiogram for results.      RECOMMENDATIONS: Follow-up with ENT.      Jaye Morfin.  Licensed Audiologist  [place] #[id number]

## 2021-02-09 NOTE — NURSING NOTE
"Chief Complaint   Patient presents with     Consult     vertigo      Blood pressure (!) 135/93, pulse 71, temperature 97.1  F (36.2  C), resp. rate 19, height 1.676 m (5' 6\"), weight 112 kg (246 lb 14.6 oz), last menstrual period 05/31/2016, SpO2 96 %, not currently breastfeeding.    Ramiro Gerardo LPN    "

## 2021-02-09 NOTE — LETTER
2/9/2021       RE: Janelle Byrd  [address] [place] [address]     Dear Colleague,    Thank you for referring your patient, Janelle Byrd, to the [facility] [place] at [facility]. Please see a copy of my visit note below.    Dear Dr. Burden, [facility]:    I had the pleasure of meeting Janelle Byrd in consultation today at the [facility] at your request.    CHIEF COMPLAINT: Dizziness    HISTORY OF PRESENT ILLNESS: Patient is a 50-year-old in today for assessment of dizziness.  She has been having some balance dizziness since October.  She describes it as a lightheadedness when she gets up quickly or moves her head quickly.  Occasionally she will have slight spinning sensation when she is lying in bed and turns to the left or right.  Passes with him seconds to a minute.  She denies any hearing issues, hearing seems symmetrical.  She does have bilateral tinnitus she has had for some time, not problematic.  Is been no pain or drainage in her ears.  No significant noise exposure that she is aware of.  She denies any extremity weakness or paresthesias.  No double vision.  No significant headaches.    ALLERGIES:    Allergies   Allergen Reactions     Depo-Medrol [Methylprednisolone] Other (See Comments)     Oral thrush after receiving an intra-articular injection, reports that she has family members that have also had this symptom following intra-articular injections.     Dust Mites      Sneezing     Mold Cough     Tape [Adhesive Tape] Rash       HABITS: Social History    Substance and Sexual Activity      Alcohol use: Yes        Comment: 1-2 times per month     History   Smoking Status     Never Smoker   Smokeless Tobacco     Never Used         PAST MEDICAL HISTORY: Please see today's intake form (for the remainder of the PMH)  which I reviewed and signed.  Past Medical History:   Diagnosis Date     Arthritis     Hands, feet, and knees     Benign positional vertigo 2/2017     Complex tear of medial meniscus of right knee as current injury, initial encounter 10/25/2016     Low back pain 12/5/2014     Nephrolithiasis 2000     S/P meniscectomy 12/14/2016     Skin cancer 06/2001    Basal cell carcinoma on nose     Sleep apnea 6/2018     Tinnitus 7/1979     TRANS HYPERTEN-ANTEPART 5/10/2006     Umbilical discharge 6/16/2014       FAMILY HISTORY/SOCIAL HISTORY:   Family History   Problem Relation Age of Onset     Lipids Mother      Arthritis Mother      Psychotic Disorder Mother      Respiratory Mother         copd     Circulatory Mother         BLOOD CLOTS     Genitourinary Problems Mother      Heart Failure Mother      Depression Mother      Mental Illness Mother         Bi-polar     Obesity Mother      Lipids Father      Alcohol/Drug Father      Arthritis Father      C.A.D. Father      Cancer - colorectal Father      Hypertension Father      Cerebrovascular Disease Father         10/2018     Colon Cancer Father      Substance Abuse Father      Anesthesia Reaction Father      Lung Cancer Father      Heart Disease Maternal Grandmother      Breast Cancer Maternal Grandmother      Cerebrovascular Disease Maternal Grandmother      Alcohol/Drug Maternal Grandfather      Cerebrovascular Disease Maternal Grandfather      Alcohol/Drug Paternal Grandmother      Hypertension Paternal Grandmother      C.A.D. Paternal Grandfather      Gastrointestinal Disease Paternal Grandfather         liver diease     Alcohol/Drug Paternal Grandfather      Cerebrovascular Disease Paternal Grandfather      Circulatory Sister         BLOOD CLOTS     Diabetes Brother      Diabetes Sister       Social History     Socioeconomic History     Marital status:      Spouse name: Calvin     Number of children: 3     Years of education: Not on file     Highest education  level: Not on file   Occupational History     Occupation: student      Employer: [employer]     Comment: writing instructor   Social Needs     Financial resource strain: Not on file     Food insecurity     Worry: Not on file     Inability: Not on file     Transportation needs     Medical: Not on file     Non-medical: Not on file   Tobacco Use     Smoking status: Never Smoker     Smokeless tobacco: Never Used   Substance and Sexual Activity     Alcohol use: Yes     Comment: 1-2 times per month     Drug use: No     Sexual activity: Yes     Partners: Male     Birth control/protection: Post-menopausal, Female Surgical     Comment: Tubal ligation 11/08/2008.   Lifestyle     Physical activity     Days per week: Not on file     Minutes per session: Not on file     Stress: Not on file   Relationships     Social connections     Talks on phone: Not on file     Gets together: Not on file     Attends Buddhism service: Not on file     Active member of club or organization: Not on file     Attends meetings of clubs or organizations: Not on file     Relationship status: Not on file     Intimate partner violence     Fear of current or ex partner: Not on file     Emotionally abused: Not on file     Physically abused: Not on file     Forced sexual activity: Not on file   Other Topics Concern     Parent/sibling w/ CABG, MI or angioplasty before 65F 55M? Yes     Comment: Father   Social History Narrative    Caffeine intake/servings daily - 1    Calcium intake/servings daily - 2-3    Exercise 2 times weekly - describe walking, treadmill, running    Sunscreen used - Yes    Seatbelts used - Yes    Guns stored in the home - No    Self Breast Exam - Yes    Pap test up to date -  Yes, as of today, 6/7/2010 Normal    Eye exam up to date -  Yes    Dental exam up to date -  Yes    DEXA scan up to date -  Not Applicable    Flex Sig/Colonoscopy up to date -  Not Applicable    Mammography up to date -  Not Applicable    Immunizations  reviewed and up to date - Yes, tetanus 3/9/2009    Abuse: Current or Past (Physical, Sexual or Emotional) - No    Do you feel safe in your environment - Yes    Do you cope well with stress - Yes    Do you suffer from insomnia - Yes, staying asleep    Last updated by: Amalia Perez CMA 8/29/2011                                       REVIEW OF SYSTEMS: [unfilled]    The remainder of the 10 point ROS is negative    PHYSICIAL EXAMINATION:  Constitutional: The patient was well-groomed and in no acute distress.   Skin: Warm and pink.  Psychiatric: The patient's affect was calm, cooperative, and appropriate.   Respiratory: Breathing comfortably without stridor or exertion of accessory muscles.  Eyes: Pupils were equal and reactive. Extraocular movement intact.   Head: Normocephalic and atraumatic. No lesions or scars.  Ears: Patient placed under the microscope for microscopic evaluation and cleaning of cerumen which was obscuring full visualization and complete assessment of both TMs. Under high power magnification, the right ear was examined and cleaned of cerumen using curet, alligator forceps, and suction.  After cleaning, TM is fully visualized and has normal position with normal middle ear aeration. The left ear was then cleaned and inspected using microscope, instruments and similar techniques. After cleaning of cerumen, the TM has normal position with normal aeration to middle ear.  Nose: Sinuses were nontender. Anterior rhinoscopy revealed midline septum and absence of purulence or polyps.  Oral Cavity: Normal tongue, floor of mouth, buccal mucosa, and palate. No lesions or masses on inspection or palpation. No abnormal lymph tissue in the oropharynx.   Neck: The parotid is soft without masses. Supple with normal laryngeal and tracheal landmarks.   Lymphatic: There is no palpable lymphadenopathy or other masses in the neck.   Neurologic: Alert and oriented x 3. Cranial nerves III-XI within normal limits. Voice  quality normal.  Cerebellar Function Tests:  Grossly normal    Audiogram: Audiogram performed shows normal hearing in both ears through all frequencies.  100 % discrimination bilaterally.  Normal type A tympanograms bilaterally.      IMPRESSION AND PLAN:   1. Dizziness: Discussed this with her in detail.  Certainly does not seem to be typical for any otologic entity.  Again with her normal audiogram and normal vestibular testing, along with description of symptoms, I do not think this looks to be otologic in nature.  I am going to have her go to physical therapy for vestibular rehabilitation, might be a possible component of benign positional vertigo with the rolling to the side in bed.  Again [place]-Hallpike's were negative.  We will give physical therapy a trial and if no improvement capacity see neurology or proceed with MRI imaging.  2. Bilateral tinnitus:, No treatment needed, monitor.  3. Excessive cerumen: Cleaned today, no further treatment needed, monitor.    Thank you very much for the opportunity to participate in the care of your patient.    Rick L Nissen MD

## 2021-02-09 NOTE — PROGRESS NOTES
Dear Dr. Burden, [facility]:    I had the pleasure of meeting Janelle Byrd in consultation today at the [facility] at your request.    CHIEF COMPLAINT: Dizziness    HISTORY OF PRESENT ILLNESS: Patient is a 50-year-old in today for assessment of dizziness.  She has been having some balance dizziness since October.  She describes it as a lightheadedness when she gets up quickly or moves her head quickly.  Occasionally she will have slight spinning sensation when she is lying in bed and turns to the left or right.  Passes with him seconds to a minute.  She denies any hearing issues, hearing seems symmetrical.  She does have bilateral tinnitus she has had for some time, not problematic.  Is been no pain or drainage in her ears.  No significant noise exposure that she is aware of.  She denies any extremity weakness or paresthesias.  No double vision.  No significant headaches.    ALLERGIES:    Allergies   Allergen Reactions     Depo-Medrol [Methylprednisolone] Other (See Comments)     Oral thrush after receiving an intra-articular injection, reports that she has family members that have also had this symptom following intra-articular injections.     Dust Mites      Sneezing     Mold Cough     Tape [Adhesive Tape] Rash       HABITS: Social History    Substance and Sexual Activity      Alcohol use: Yes        Comment: 1-2 times per month     History   Smoking Status     Never Smoker   Smokeless Tobacco     Never Used         PAST MEDICAL HISTORY: Please see today's intake form (for the remainder of the PMH) which I reviewed and signed.  Past Medical History:   Diagnosis Date     Arthritis     Hands, feet, and knees     Benign positional vertigo 2/2017     Complex tear of medial meniscus of right knee as current injury, initial encounter 10/25/2016     Low back pain 12/5/2014     Nephrolithiasis 2000     S/P meniscectomy 12/14/2016     Skin cancer 06/2001     Basal cell carcinoma on nose     Sleep apnea 6/2018     Tinnitus 7/1979     TRANS HYPERTEN-ANTEPART 5/10/2006     Umbilical discharge 6/16/2014       FAMILY HISTORY/SOCIAL HISTORY:   Family History   Problem Relation Age of Onset     Lipids Mother      Arthritis Mother      Psychotic Disorder Mother      Respiratory Mother         copd     Circulatory Mother         BLOOD CLOTS     Genitourinary Problems Mother      Heart Failure Mother      Depression Mother      Mental Illness Mother         Bi-polar     Obesity Mother      Lipids Father      Alcohol/Drug Father      Arthritis Father      C.A.D. Father      Cancer - colorectal Father      Hypertension Father      Cerebrovascular Disease Father         10/2018     Colon Cancer Father      Substance Abuse Father      Anesthesia Reaction Father      Lung Cancer Father      Heart Disease Maternal Grandmother      Breast Cancer Maternal Grandmother      Cerebrovascular Disease Maternal Grandmother      Alcohol/Drug Maternal Grandfather      Cerebrovascular Disease Maternal Grandfather      Alcohol/Drug Paternal Grandmother      Hypertension Paternal Grandmother      C.A.D. Paternal Grandfather      Gastrointestinal Disease Paternal Grandfather         liver diease     Alcohol/Drug Paternal Grandfather      Cerebrovascular Disease Paternal Grandfather      Circulatory Sister         BLOOD CLOTS     Diabetes Brother      Diabetes Sister       Social History     Socioeconomic History     Marital status:      Spouse name: Calvin     Number of children: 3     Years of education: Not on file     Highest education level: Not on file   Occupational History     Occupation: student      Employer: [employer]     Comment: writing instructor   Social Needs     Financial resource strain: Not on file     Food insecurity     Worry: Not on file     Inability: Not on file     Transportation needs     Medical: Not on file     Non-medical: Not on file   Tobacco Use      Smoking status: Never Smoker     Smokeless tobacco: Never Used   Substance and Sexual Activity     Alcohol use: Yes     Comment: 1-2 times per month     Drug use: No     Sexual activity: Yes     Partners: Male     Birth control/protection: Post-menopausal, Female Surgical     Comment: Tubal ligation 11/08/2008.   Lifestyle     Physical activity     Days per week: Not on file     Minutes per session: Not on file     Stress: Not on file   Relationships     Social connections     Talks on phone: Not on file     Gets together: Not on file     Attends Jainism service: Not on file     Active member of club or organization: Not on file     Attends meetings of clubs or organizations: Not on file     Relationship status: Not on file     Intimate partner violence     Fear of current or ex partner: Not on file     Emotionally abused: Not on file     Physically abused: Not on file     Forced sexual activity: Not on file   Other Topics Concern     Parent/sibling w/ CABG, MI or angioplasty before 65F 55M? Yes     Comment: Father   Social History Narrative    Caffeine intake/servings daily - 1    Calcium intake/servings daily - 2-3    Exercise 2 times weekly - describe walking, treadmill, running    Sunscreen used - Yes    Seatbelts used - Yes    Guns stored in the home - No    Self Breast Exam - Yes    Pap test up to date -  Yes, as of today, 6/7/2010 Normal    Eye exam up to date -  Yes    Dental exam up to date -  Yes    DEXA scan up to date -  Not Applicable    Flex Sig/Colonoscopy up to date -  Not Applicable    Mammography up to date -  Not Applicable    Immunizations reviewed and up to date - Yes, tetanus 3/9/2009    Abuse: Current or Past (Physical, Sexual or Emotional) - No    Do you feel safe in your environment - Yes    Do you cope well with stress - Yes    Do you suffer from insomnia - Yes, staying asleep    Last updated by: Amalia Perez CMA 8/29/2011                                       REVIEW OF SYSTEMS:  [unfilled]    The remainder of the 10 point ROS is negative    PHYSICIAL EXAMINATION:  Constitutional: The patient was well-groomed and in no acute distress.   Skin: Warm and pink.  Psychiatric: The patient's affect was calm, cooperative, and appropriate.   Respiratory: Breathing comfortably without stridor or exertion of accessory muscles.  Eyes: Pupils were equal and reactive. Extraocular movement intact.   Head: Normocephalic and atraumatic. No lesions or scars.  Ears: Patient placed under the microscope for microscopic evaluation and cleaning of cerumen which was obscuring full visualization and complete assessment of both TMs. Under high power magnification, the right ear was examined and cleaned of cerumen using curet, alligator forceps, and suction.  After cleaning, TM is fully visualized and has normal position with normal middle ear aeration. The left ear was then cleaned and inspected using microscope, instruments and similar techniques. After cleaning of cerumen, the TM has normal position with normal aeration to middle ear.  Nose: Sinuses were nontender. Anterior rhinoscopy revealed midline septum and absence of purulence or polyps.  Oral Cavity: Normal tongue, floor of mouth, buccal mucosa, and palate. No lesions or masses on inspection or palpation. No abnormal lymph tissue in the oropharynx.   Neck: The parotid is soft without masses. Supple with normal laryngeal and tracheal landmarks.   Lymphatic: There is no palpable lymphadenopathy or other masses in the neck.   Neurologic: Alert and oriented x 3. Cranial nerves III-XI within normal limits. Voice quality normal.  Cerebellar Function Tests:  Grossly normal    Audiogram: Audiogram performed shows normal hearing in both ears through all frequencies.  100 % discrimination bilaterally.  Normal type A tympanograms bilaterally.      IMPRESSION AND PLAN:   1. Dizziness: Discussed this with her in detail.  Certainly does not seem to be typical for any  otologic entity.  Again with her normal audiogram and normal vestibular testing, along with description of symptoms, I do not think this looks to be otologic in nature.  I am going to have her go to physical therapy for vestibular rehabilitation, might be a possible component of benign positional vertigo with the rolling to the side in bed.  Again Groton-Hallpike's were negative.  We will give physical therapy a trial and if no improvement capacity see neurology or proceed with MRI imaging.  2. Bilateral tinnitus:, No treatment needed, monitor.  3. Excessive cerumen: Cleaned today, no further treatment needed, monitor.    Thank you very much for the opportunity to participate in the care of your patient.    Rick L Nissen MD

## 2021-02-13 ENCOUNTER — HEALTH MAINTENANCE LETTER (OUTPATIENT)
Age: 51
End: 2021-02-13

## 2021-05-04 ENCOUNTER — OFFICE VISIT (OUTPATIENT)
Dept: FAMILY MEDICINE | Facility: CLINIC | Age: 51
End: 2021-05-04
Payer: COMMERCIAL

## 2021-05-04 ENCOUNTER — TELEPHONE (OUTPATIENT)
Dept: FAMILY MEDICINE | Facility: CLINIC | Age: 51
End: 2021-05-04

## 2021-05-04 VITALS
OXYGEN SATURATION: 98 % | HEART RATE: 84 BPM | SYSTOLIC BLOOD PRESSURE: 138 MMHG | HEIGHT: 66 IN | DIASTOLIC BLOOD PRESSURE: 90 MMHG | TEMPERATURE: 98 F | RESPIRATION RATE: 20 BRPM | WEIGHT: 253.6 LBS | BODY MASS INDEX: 40.76 KG/M2

## 2021-05-04 DIAGNOSIS — R19.8 UMBILICUS DISCHARGE: Primary | ICD-10-CM

## 2021-05-04 DIAGNOSIS — I10 HYPERTENSION GOAL BP (BLOOD PRESSURE) < 140/90: ICD-10-CM

## 2021-05-04 LAB
ANION GAP SERPL CALCULATED.3IONS-SCNC: 6 MMOL/L (ref 3–14)
BUN SERPL-MCNC: 12 MG/DL (ref 7–30)
CALCIUM SERPL-MCNC: 9.6 MG/DL (ref 8.5–10.1)
CHLORIDE SERPL-SCNC: 105 MMOL/L (ref 94–109)
CO2 SERPL-SCNC: 28 MMOL/L (ref 20–32)
CREAT SERPL-MCNC: 0.76 MG/DL (ref 0.52–1.04)
GFR SERPL CREATININE-BSD FRML MDRD: >90 ML/MIN/{1.73_M2}
GLUCOSE SERPL-MCNC: 103 MG/DL (ref 70–99)
POTASSIUM SERPL-SCNC: 3.5 MMOL/L (ref 3.4–5.3)
SODIUM SERPL-SCNC: 139 MMOL/L (ref 133–144)

## 2021-05-04 PROCEDURE — 36415 COLL VENOUS BLD VENIPUNCTURE: CPT | Performed by: FAMILY MEDICINE

## 2021-05-04 PROCEDURE — 99214 OFFICE O/P EST MOD 30 MIN: CPT | Performed by: FAMILY MEDICINE

## 2021-05-04 PROCEDURE — 80048 BASIC METABOLIC PNL TOTAL CA: CPT | Performed by: FAMILY MEDICINE

## 2021-05-04 RX ORDER — HYDROCHLOROTHIAZIDE 25 MG/1
25 TABLET ORAL DAILY
Qty: 90 TABLET | Refills: 1 | Status: CANCELLED | OUTPATIENT
Start: 2021-05-04

## 2021-05-04 RX ORDER — LISINOPRIL AND HYDROCHLOROTHIAZIDE 20; 25 MG/1; MG/1
1 TABLET ORAL DAILY
Qty: 30 TABLET | Refills: 1 | Status: SHIPPED | OUTPATIENT
Start: 2021-05-04 | End: 2021-06-04

## 2021-05-04 RX ORDER — CEPHALEXIN 500 MG/1
CAPSULE ORAL
COMMUNITY
Start: 2021-04-30 | End: 2021-06-04

## 2021-05-04 ASSESSMENT — PATIENT HEALTH QUESTIONNAIRE - PHQ9
5. POOR APPETITE OR OVEREATING: SEVERAL DAYS
SUM OF ALL RESPONSES TO PHQ QUESTIONS 1-9: 3

## 2021-05-04 ASSESSMENT — ANXIETY QUESTIONNAIRES
IF YOU CHECKED OFF ANY PROBLEMS ON THIS QUESTIONNAIRE, HOW DIFFICULT HAVE THESE PROBLEMS MADE IT FOR YOU TO DO YOUR WORK, TAKE CARE OF THINGS AT HOME, OR GET ALONG WITH OTHER PEOPLE: SOMEWHAT DIFFICULT
5. BEING SO RESTLESS THAT IT IS HARD TO SIT STILL: NOT AT ALL
7. FEELING AFRAID AS IF SOMETHING AWFUL MIGHT HAPPEN: NOT AT ALL
GAD7 TOTAL SCORE: 5
2. NOT BEING ABLE TO STOP OR CONTROL WORRYING: SEVERAL DAYS
6. BECOMING EASILY ANNOYED OR IRRITABLE: SEVERAL DAYS
3. WORRYING TOO MUCH ABOUT DIFFERENT THINGS: SEVERAL DAYS
1. FEELING NERVOUS, ANXIOUS, OR ON EDGE: SEVERAL DAYS

## 2021-05-04 ASSESSMENT — PAIN SCALES - GENERAL: PAINLEVEL: MILD PAIN (2)

## 2021-05-04 ASSESSMENT — MIFFLIN-ST. JEOR: SCORE: 1787.07

## 2021-05-04 NOTE — PROGRESS NOTES
Assessment & Plan     Umbilicus discharge  There is an area that feels firm like it may need an I&D.  I am having her see general surgery today for possible I&D.  However stop the Keflex.  - GENERAL SURG ADULT REFERRAL; Future    Hypertension goal BP (blood pressure) < 140/90  We will add lisinopril to her hydrochlorothiazide and recheck in about a month  - lisinopril-hydrochlorothiazide (ZESTORETIC) 20-25 MG tablet; Take 1 tablet by mouth daily  - Basic metabolic panel  (Ca, Cl, CO2, Creat, Gluc, K, Na, BUN)    30 minutes spent on the date of the encounter doing chart review, review of test results, interpretation of tests, patient visit and documentation          Return in about 4 weeks (around 6/1/2021) for BP Recheck.    Alise Frias [facility]   Tova is a 50 year old who presents for the following health issues     HPI     Concern - Possible cellulitis?  Onset: a week ago Sunday  Description: While out and leaning over she noticed some pain around belly button- thought it was just the button on her jeans but then has continued to have symptoms  Intensity: moderate  Progression of Symptoms:  worsening  Accompanying Signs & Symptoms: Bloody yellow- brown pus, redness, pain  Previous history of similar problem: History of cellulitis in the past  Precipitating factors:        Worsened by: bending over  Alleviating factors:        Improved by: none  Therapies tried and outcome:  OTC antifungal ointment - Seen through [facility] x 2 and was given Keflex 500mg every 6 hours x 10 days- a little improvement    She had a CT in 2014 to rule out urachal cyst       Hypertension Follow-up      Do you check your blood pressure regularly outside of the clinic? Yes     Are you following a low salt diet? Yes    Are your blood pressures ever more than 140 on the top number (systolic) OR more   than 90 on the bottom number (diastolic), for example 140/90? Yes      How many  "servings of fruits and vegetables do you eat daily?  2-3    On average, how many sweetened beverages do you drink each day (Examples: soda, juice, sweet tea, etc.  Do NOT count diet or artificially sweetened beverages)?   1    How many days per week do you exercise enough to make your heart beat faster? 3 or less    How many minutes a day do you exercise enough to make your heart beat faster? 20 - 29    How many days per week do you miss taking your medication? 0         Review of Systems   Constitutional, HEENT, cardiovascular, pulmonary, gi and gu systems are negative, except as otherwise noted.      Objective    BP (!) 138/90 (BP Location: Right arm, Patient Position: Sitting, Cuff Size: Adult Large)   Pulse 84   Temp 98  F (36.7  C) (Oral)   Resp 20   Ht 1.676 m (5' 6\")   Wt 115 kg (253 lb 9.6 oz)   LMP 05/31/2016 (Approximate)   SpO2 98%   BMI 40.93 kg/m    Body mass index is 40.93 kg/m .  Physical Exam   GENERAL: healthy, alert and no distress  NECK: no adenopathy, no asymmetry, masses, or scars and thyroid normal to palpation  RESP: lungs clear to auscultation - no rales, rhonchi or wheezes  CV: regular rate and rhythm, normal S1 S2, no S3 or S4, no murmur, click or rub, no peripheral edema and peripheral pulses strong  ABDOMEN: tenderness umbilical and bowel sounds normal  MS: no gross musculoskeletal defects noted, no edema  SKIN: Firm tender area around her umbilicus on the right, discharge but no erythema  PSYCH: mentation appears normal, affect normal/bright          "

## 2021-05-04 NOTE — TELEPHONE ENCOUNTER
Routing to team [facility] specialty    Please reach out to patient to schedule with appropriate provider who does particular surgery/procedure.  Patient having pus around her umbilicus, ? cellulitis  Seen today at [facility].  See provider note.    Was scheduled in [place] but provider schedueld with does not do procedure patient needs          RN received phone call from patient stating [place] surgery had just called her to cancel this afternoons appointment due to provider scheduled with does not do that type of procedure, and the earliest they could get her in would be May 17th.  Patient was told to call [facility] to reschedule. Patient scheduled earlier in the day by clinic. Patient stated the surgery should be done ASAP Dr. Altagracia Farley, RN, BSN, PHN  [facility]

## 2021-05-04 NOTE — TELEPHONE ENCOUNTER
Returned call to pt, she is scheduled to see dr Bianchi on Thursday. Pt was instructed to go to [facility] if this became and emergent need. Pt verbalized understanding.    Padmini CHRISTIANSON RN Specialty Triage 5/4/2021 2:25 PM

## 2021-05-05 ASSESSMENT — ANXIETY QUESTIONNAIRES: GAD7 TOTAL SCORE: 5

## 2021-05-06 ENCOUNTER — OFFICE VISIT (OUTPATIENT)
Dept: SURGERY | Facility: CLINIC | Age: 51
End: 2021-05-06
Payer: COMMERCIAL

## 2021-05-06 VITALS
HEIGHT: 66 IN | SYSTOLIC BLOOD PRESSURE: 134 MMHG | DIASTOLIC BLOOD PRESSURE: 83 MMHG | WEIGHT: 252 LBS | HEART RATE: 87 BPM | BODY MASS INDEX: 40.5 KG/M2

## 2021-05-06 DIAGNOSIS — R19.8 UMBILICUS DISCHARGE: ICD-10-CM

## 2021-05-06 PROCEDURE — 99243 OFF/OP CNSLTJ NEW/EST LOW 30: CPT | Performed by: SURGERY

## 2021-05-06 ASSESSMENT — MIFFLIN-ST. JEOR: SCORE: 1779.81

## 2021-05-06 NOTE — PROGRESS NOTES
General Surgery Consultation    Assessment:  Infected umbilicus    Plan:  We discussed the risk benefits and alternatives to surgery including but not limited to pain, bleeding, continued infection, and excision of umbilicus. We also discussed continued observation and incision and drainage. I don't think she would benefit from an I&D at this point.  She is to call if she would like surgery or it worsens.    HPI:  Janelle presents today in consultation for an infected umbilicus. It started just over a week ago when she was bending over she noticed some pain. She was seen in Urgent care and placed on antibiotics.  She was then seen by Dr Frias a few days ago who stopped her Keflex. She reports improvement.    Fever/Chills: No  Currently on antibiotics: Yes  MRSA exposure: No        PMH:  Past Medical History:   Diagnosis Date     Arthritis     Hands, feet, and knees     Benign positional vertigo 2017     Complex tear of medial meniscus of right knee as current injury, initial encounter 10/25/2016     Low back pain 2014     Nephrolithiasis 2000     S/P meniscectomy 2016     Skin cancer 2001    Basal cell carcinoma on nose     Sleep apnea 2018     Tinnitus 1979     TRANS HYPERTEN-ANTEPART 5/10/2006     Umbilical discharge 2014       PSH:  Past Surgical History:   Procedure Laterality Date     ARTHROSCOPY SHOULDER  10/2004    LT/to remove calcium deposits     ARTHROSCOPY SHOULDER DECOMPRESSION Left 2014    Procedure: ARTHROSCOPY SHOULDER DECOMPRESSION;  Surgeon: Julien Maciel MD;  Location:  OR     BIOPSY  2001    skin cancer from nose      SECTION  2006    , Low Transverse      SECTION  2008     COLONOSCOPY  2016    Normal     DILATION AND CURETTAGE, OPERATIVE HYSTEROSCOPY WITH MORCELLATOR, COMBINED N/A 2016    Procedure: COMBINED DILATION AND CURETTAGE, OPERATIVE HYSTEROSCOPY WITH MORCELLATOR;  Surgeon: Idalia Mcarthur MD;  Location:   "OR       Allergies:  Allergies   Allergen Reactions     Depo-Medrol [Methylprednisolone] Other (See Comments)     Oral thrush after receiving an intra-articular injection, reports that she has family members that have also had this symptom following intra-articular injections.     Dust Mites      Sneezing     Mold Cough     Tape [Adhesive Tape] Rash       Home Medications:  Current Outpatient Medications   Medication Sig Dispense Refill     cephALEXin (KEFLEX) 500 MG capsule Take 1 capsule every 6 hours for 10 days       IBUPROFEN PO As needed       lisinopril-hydrochlorothiazide (ZESTORETIC) 20-25 MG tablet Take 1 tablet by mouth daily 30 tablet 1     metroNIDAZOLE (METROGEL) 0.75 % gel USE 1 APPLICATION QD  3     MULTI-VITAMIN OR TABS 1 tablet daily  0       Social History:  Social History     Tobacco Use     Smoking status: Never Smoker     Smokeless tobacco: Never Used   Substance Use Topics     Alcohol use: Yes     Comment: 1-2 times per month     Drug use: No       ROS:  10 point ROS neg other than the symptoms noted above in the HPI.    PE:  /83   Pulse 87   Ht 1.676 m (5' 6\")   Wt 114.3 kg (252 lb)   LMP 05/31/2016 (Approximate)   BMI 40.67 kg/m    Constitutional: healthy, alert and no distress  Eyes: Pupils round and equal, no icterus   ENT: Mucous membranes moist  Respiratory:  Non-labored respiration  Gastrointestinal: Abdomen soft, non-tender. BS normal. No masses, organomegaly  Musculoskeletal: No gross deformity  Neurologic: No gross focal deficits  Psychiatric: mentation appears normal and affect normal/bright  Hematologic/Lymphatic/Immunologic: No bruising noted  Skin: Some induration at umbilicus, no fluctuance    CT scan reviewed - no Urachal cyst noted    Labs from 5.4.21 reviewed - glucose 103    Gordon Bianchi DO          "

## 2021-05-06 NOTE — LETTER
2021         RE: Janelle Byrd  [address] [place] [address]        Dear Colleague,    Thank you for referring your patient, Janelle Byrd, to the [facility]. Please see a copy of my visit note below.    General Surgery Consultation    Assessment:  Infected umbilicus    Plan:  We discussed the risk benefits and alternatives to surgery including but not limited to pain, bleeding, continued infection, and excision of umbilicus. We also discussed continued observation and incision and drainage. I don't think she would benefit from an I&D at this point.  She is to call if she would like surgery or it worsens.    HPI:  Janelle presents today in consultation for an infected umbilicus. It started just over a week ago when she was bending over she noticed some pain. She was seen in Urgent care and placed on antibiotics.  She was then seen by Dr Frias a few days ago who stopped her Keflex. She reports improvement.    Fever/Chills: No  Currently on antibiotics: Yes  MRSA exposure: No        PMH:  Past Medical History:   Diagnosis Date     Arthritis     Hands, feet, and knees     Benign positional vertigo 2017     Complex tear of medial meniscus of right knee as current injury, initial encounter 10/25/2016     Low back pain 2014     Nephrolithiasis 2000     S/P meniscectomy 2016     Skin cancer 2001    Basal cell carcinoma on nose     Sleep apnea 2018     Tinnitus 1979     TRANS HYPERTEN-ANTEPART 5/10/2006     Umbilical discharge 2014       PSH:  Past Surgical History:   Procedure Laterality Date     ARTHROSCOPY SHOULDER  10/2004    LT/to remove calcium deposits     ARTHROSCOPY SHOULDER DECOMPRESSION Left 2014    Procedure: ARTHROSCOPY SHOULDER DECOMPRESSION;  Surgeon: Julien Maciel MD;  Location: [facility] OR     BIOPSY  2001    skin cancer from nose      SECTION  2006    , Low Transverse      " SECTION  2008     COLONOSCOPY  2016    Normal     DILATION AND CURETTAGE, OPERATIVE HYSTEROSCOPY WITH MORCELLATOR, COMBINED N/A 2016    Procedure: COMBINED DILATION AND CURETTAGE, OPERATIVE HYSTEROSCOPY WITH MORCELLATOR;  Surgeon: Idalia Mcarthur MD;  Location: [facility] OR       Allergies:  Allergies   Allergen Reactions     Depo-Medrol [Methylprednisolone] Other (See Comments)     Oral thrush after receiving an intra-articular injection, reports that she has family members that have also had this symptom following intra-articular injections.     Dust Mites      Sneezing     Mold Cough     Tape [Adhesive Tape] Rash       Home Medications:  Current Outpatient Medications   Medication Sig Dispense Refill     cephALEXin (KEFLEX) 500 MG capsule Take 1 capsule every 6 hours for 10 days       IBUPROFEN PO As needed       lisinopril-hydrochlorothiazide (ZESTORETIC) 20-25 MG tablet Take 1 tablet by mouth daily 30 tablet 1     metroNIDAZOLE (METROGEL) 0.75 % gel USE 1 APPLICATION QD  3     MULTI-VITAMIN OR TABS 1 tablet daily  0       Social History:  Social History     Tobacco Use     Smoking status: Never Smoker     Smokeless tobacco: Never Used   Substance Use Topics     Alcohol use: Yes     Comment: 1-2 times per month     Drug use: No       ROS:  10 point ROS neg other than the symptoms noted above in the HPI.    PE:  /83   Pulse 87   Ht 1.676 m (5' 6\")   Wt 114.3 kg (252 lb)   LMP 2016 (Approximate)   BMI 40.67 kg/m    Constitutional: healthy, alert and no distress  Eyes: Pupils round and equal, no icterus   ENT: Mucous membranes moist  Respiratory:  Non-labored respiration  Gastrointestinal: Abdomen soft, non-tender. BS normal. No masses, organomegaly  Musculoskeletal: No gross deformity  Neurologic: No gross focal deficits  Psychiatric: mentation appears normal and affect normal/bright  Hematologic/Lymphatic/Immunologic: No bruising noted  Skin: Some induration at umbilicus, no " fluctuance    CT scan reviewed - no Urachal cyst noted    Labs from 5.4.21 reviewed - glucose 103    Gordon Bianchi, DO              Again, thank you for allowing me to participate in the care of your patient.        Sincerely,        Gordon Bianchi, DO

## 2021-06-04 ENCOUNTER — OFFICE VISIT (OUTPATIENT)
Dept: FAMILY MEDICINE | Facility: CLINIC | Age: 51
End: 2021-06-04
Payer: COMMERCIAL

## 2021-06-04 ENCOUNTER — TELEPHONE (OUTPATIENT)
Dept: FAMILY MEDICINE | Facility: CLINIC | Age: 51
End: 2021-06-04

## 2021-06-04 VITALS
RESPIRATION RATE: 16 BRPM | DIASTOLIC BLOOD PRESSURE: 74 MMHG | HEART RATE: 82 BPM | WEIGHT: 250.8 LBS | BODY MASS INDEX: 40.31 KG/M2 | HEIGHT: 66 IN | SYSTOLIC BLOOD PRESSURE: 134 MMHG | TEMPERATURE: 98.2 F | OXYGEN SATURATION: 97 %

## 2021-06-04 DIAGNOSIS — Z12.31 ENCOUNTER FOR SCREENING MAMMOGRAM FOR MALIGNANT NEOPLASM OF BREAST: ICD-10-CM

## 2021-06-04 DIAGNOSIS — G47.33 OSA (OBSTRUCTIVE SLEEP APNEA): Chronic | ICD-10-CM

## 2021-06-04 DIAGNOSIS — Z12.11 SCREEN FOR COLON CANCER: ICD-10-CM

## 2021-06-04 DIAGNOSIS — E66.01 MORBID OBESITY (H): ICD-10-CM

## 2021-06-04 DIAGNOSIS — L30.4 INTERTRIGO: Primary | ICD-10-CM

## 2021-06-04 DIAGNOSIS — H93.19 TINNITUS, UNSPECIFIED LATERALITY: ICD-10-CM

## 2021-06-04 DIAGNOSIS — I10 HYPERTENSION GOAL BP (BLOOD PRESSURE) < 140/90: Primary | ICD-10-CM

## 2021-06-04 DIAGNOSIS — L30.4 INTERTRIGO: ICD-10-CM

## 2021-06-04 PROCEDURE — 90471 IMMUNIZATION ADMIN: CPT | Performed by: FAMILY MEDICINE

## 2021-06-04 PROCEDURE — 99214 OFFICE O/P EST MOD 30 MIN: CPT | Mod: 25 | Performed by: FAMILY MEDICINE

## 2021-06-04 PROCEDURE — 90714 TD VACC NO PRESV 7 YRS+ IM: CPT | Performed by: FAMILY MEDICINE

## 2021-06-04 RX ORDER — NYSTATIN 100000 [USP'U]/G
POWDER TOPICAL 2 TIMES DAILY PRN
Qty: 45 G | Refills: 1 | Status: SHIPPED | OUTPATIENT
Start: 2021-06-04 | End: 2022-01-14

## 2021-06-04 RX ORDER — NYSTATIN 100000 U/G
CREAM TOPICAL 2 TIMES DAILY
Qty: 30 G | Refills: 4 | Status: SHIPPED | OUTPATIENT
Start: 2021-06-04 | End: 2021-07-16

## 2021-06-04 RX ORDER — LISINOPRIL AND HYDROCHLOROTHIAZIDE 20; 25 MG/1; MG/1
1 TABLET ORAL DAILY
Qty: 90 TABLET | Refills: 1 | Status: SHIPPED | OUTPATIENT
Start: 2021-06-04 | End: 2021-07-16

## 2021-06-04 ASSESSMENT — MIFFLIN-ST. JEOR: SCORE: 1774.37

## 2021-06-04 ASSESSMENT — PAIN SCALES - GENERAL: PAINLEVEL: NO PAIN (0)

## 2021-06-04 NOTE — TELEPHONE ENCOUNTER
Routing to Prior Auth team. See phone message    Called pharmacy for clarification. They state would need to do a PA or find out which Nystatin is covered as the powder is not. They state it could be the Nystatin cream or ointment that is covered.    Chaparrita Bassett RN

## 2021-06-04 NOTE — NURSING NOTE
Prior to immunization administration, verified patients identity using patient s name and date of birth. Please see Immunization Activity for additional information.     Screening Questionnaire for Adult Immunization    Are you sick today?   No   Do you have allergies to medications, food, a vaccine component or latex?   No   Have you ever had a serious reaction after receiving a vaccination?   No   Do you have a long-term health problem with heart, lung, kidney, or metabolic disease (e.g., diabetes), asthma, a blood disorder, no spleen, complement component deficiency, a cochlear implant, or a spinal fluid leak?  Are you on long-term aspirin therapy?   No   Do you have cancer, leukemia, HIV/AIDS, or any other immune system problem?   No   Do you have a parent, brother, or sister with an immune system problem?   No   In the past 3 months, have you taken medications that affect  your immune system, such as prednisone, other steroids, or anticancer drugs; drugs for the treatment of rheumatoid arthritis, Crohn s disease, or psoriasis; or have you had radiation treatments?   No   Have you had a seizure, or a brain or other nervous system problem?   No   During the past year, have you received a transfusion of blood or blood    products, or been given immune (gamma) globulin or antiviral drug?   No   For women: Are you pregnant or is there a chance you could become       pregnant during the next month?   No   Have you received any vaccinations in the past 4 weeks?   No     Immunization questionnaire answers were all negative.        Patient instructed to remain in clinic for 15 minutes afterwards, and to report any adverse reaction to me immediately.       Screening performed by Eleni Larson CMA on 6/4/2021 at 9:33 AM.

## 2021-06-04 NOTE — TELEPHONE ENCOUNTER
Drug Change Request: Re:nystatin (MYCOSTATIN) [id number] UNIT/GM external powder  Drug not covered by patient plan. The preferred alternative is:Nystatin  Please call/fax the pharmacy to change medication along with strength, directions, quantity and refills.

## 2021-06-04 NOTE — PROGRESS NOTES
Assessment & Plan     Hypertension goal BP (blood pressure) < 140/90    The current medical regimen is effective;  continue present plan and medications.      - lisinopril-hydrochlorothiazide (ZESTORETIC) 20-25 MG tablet; Take 1 tablet by mouth daily    Intertrigo      - nystatin (MYCOSTATIN) 077004 UNIT/GM external powder; Apply topically 2 times daily as needed    SHONDA (obstructive sleep apnea)  Trial of mouth guard suggested     Morbid obesity (H)  On weight watchers    Encounter for screening mammogram for malignant neoplasm of breast    - MA Screen Bilateral w/Eben; Future    Screen for colon cancer    - GASTROENTEROLOGY ADULT REF PROCEDURE ONLY; Future    Patient is hesitant to get a Covid vaccine because she has chronic tinnitus.    30 minutes spent on the date of the encounter doing chart review, patient visit and documentation          Return in about 6 months (around 12/4/2021) for BP Recheck.    Alise Frias DO  [facility]   Tova is a 50 year old who presents for the following health issues     HPI     Hypertension Follow-up      Do you check your blood pressure regularly outside of the clinic? Yes     Are you following a low salt diet? Yes    Are your blood pressures ever more than 140 on the top number (systolic) OR more   than 90 on the bottom number (diastolic), for example 140/90? Yes - 140/70's, 120/70's in the early mornings    Lisinopril/hydrochlorothiazide 20/25 mg daily  She was seen 1 month ago and lisinopril was added to her hydrochlorothiazide for uncontrolled hypertension.    She has a history of obstructive sleep apnea.  She has cpap but is not using it.  She is propping her head up but is not sleeping well. She couldn't use the mouth guard either.        How many servings of fruits and vegetables do you eat daily?  4 or more    On average, how many sweetened beverages do you drink each day (Examples: soda, juice, sweet tea, etc.  Do NOT count diet  "or artificially sweetened beverages)?   1    How many days per week do you exercise enough to make your heart beat faster? 7    How many minutes a day do you exercise enough to make your heart beat faster? 20 - 29    How many days per week do you miss taking your medication? 0    Review of Systems   Constitutional, HEENT, cardiovascular, pulmonary, gi and gu systems are negative, except as otherwise noted.      Objective    /74 (BP Location: Right arm, Patient Position: Sitting, Cuff Size: Adult Large)   Pulse 82   Temp 98.2  F (36.8  C) (Oral)   Resp 16   Ht 1.676 m (5' 6\")   Wt 113.8 kg (250 lb 12.8 oz)   LMP 05/31/2016 (Approximate)   SpO2 97%   BMI 40.48 kg/m    Body mass index is 40.48 kg/m .  Physical Exam   GENERAL: healthy, alert and no distress  RESP: lungs clear to auscultation - no rales, rhonchi or wheezes  CV: regular rate and rhythm, normal S1 S2, no S3 or S4, no murmur, click or rub, no peripheral edema and peripheral pulses strong  MS: no gross musculoskeletal defects noted, no edema  PSYCH: mentation appears normal, affect normal/bright          "

## 2021-06-21 ENCOUNTER — TELEPHONE (OUTPATIENT)
Dept: FAMILY MEDICINE | Facility: CLINIC | Age: 51
End: 2021-06-21

## 2021-06-21 DIAGNOSIS — L30.4 INTERTRIGO: ICD-10-CM

## 2021-06-21 NOTE — TELEPHONE ENCOUNTER
Prior Authorization Retail Medication Request    Medication/Dose: nystatin (MYCOSTATIN) 118470 UNIT/GM external powder    ICD code (if different than what is on RX):  MEGAN  Previously Tried and Failed:  MEGAN  Rationale:  MEGAN    Insurance Name:  NA  Insurance ID:  [id number]      Pharmacy Information (if different than what is on RX)  Name:  [facility]  Phone:  NA

## 2021-06-23 NOTE — TELEPHONE ENCOUNTER
Prior Authorization Approval    Authorization Effective Date: 5/24/2021  Authorization Expiration Date: 6/23/2022  Medication: nystatin (MYCOSTATIN) 200351 UNIT/GM external powder  Approved Dose/Quantity:    Reference #:     Insurance Company: [employer] - Phone [phone] Fax [phone]  Expected CoPay:       CoPay Card Available:      Foundation Assistance Needed:    Which Pharmacy is filling the prescription (Not needed for infusion/clinic administered): [id number] - [place], [place] - [address] AT [facility]  Pharmacy Notified: Yes  Patient Notified: Yes  **Instructed pharmacy to notify patient when script is ready to /ship.**

## 2021-06-23 NOTE — TELEPHONE ENCOUNTER
Central Prior Authorization Team   Phone: [phone]      PA Initiation    Medication: nystatin (MYCOSTATIN) 234813 UNIT/GM external powder  Insurance Company: [employer] - Phone [phone] Fax [phone]  Pharmacy Filling the Rx: [id number] - [place], [place] - [address] AT [facility] [address]  Filling Pharmacy Phone: [phone]  Filling Pharmacy Fax:    Start Date: 6/23/2021

## 2021-06-24 DIAGNOSIS — Z12.31 ENCOUNTER FOR SCREENING MAMMOGRAM FOR MALIGNANT NEOPLASM OF BREAST: ICD-10-CM

## 2021-06-24 PROCEDURE — 77067 SCR MAMMO BI INCL CAD: CPT | Mod: TC | Performed by: RADIOLOGY

## 2021-07-16 ENCOUNTER — VIRTUAL VISIT (OUTPATIENT)
Dept: FAMILY MEDICINE | Facility: CLINIC | Age: 51
End: 2021-07-16
Payer: COMMERCIAL

## 2021-07-16 DIAGNOSIS — I10 HYPERTENSION GOAL BP (BLOOD PRESSURE) < 140/90: Primary | ICD-10-CM

## 2021-07-16 DIAGNOSIS — H93.13 TINNITUS, BILATERAL: ICD-10-CM

## 2021-07-16 PROCEDURE — 99213 OFFICE O/P EST LOW 20 MIN: CPT | Mod: 95 | Performed by: PHYSICIAN ASSISTANT

## 2021-07-16 RX ORDER — ACETAMINOPHEN 325 MG/1
325-650 TABLET ORAL EVERY 6 HOURS PRN
COMMUNITY

## 2021-07-16 RX ORDER — LOSARTAN POTASSIUM 50 MG/1
50 TABLET ORAL DAILY
Qty: 90 TABLET | Refills: 0 | Status: SHIPPED | OUTPATIENT
Start: 2021-07-16 | End: 2021-09-28

## 2021-07-16 RX ORDER — HYDROCHLOROTHIAZIDE 25 MG/1
25 TABLET ORAL DAILY
Qty: 90 TABLET | Refills: 1 | Status: SHIPPED | OUTPATIENT
Start: 2021-07-16 | End: 2021-10-25

## 2021-07-16 NOTE — PROGRESS NOTES
"Tova is a 50 year old who is being evaluated via a billable video visit.      How would you like to obtain your AVS? {AVS Preference:563283}  If the video visit is dropped, the invitation should be resent by: {video visit invitation:856539}  Will anyone else be joining your video visit? {:777311}  {If patient encounters technical issues they should call [phone] :409122}    Video Start Time: {video visit start/end time for provider to select:052829}    {PROVIDER CHARTING PREFERENCE:882738}    Subjective   Tova is a 50 year old who presents for the following health issues     HPI     {SUPERLIST (Optional):361046}    Went back on lisinopril and went off of it this past Sunday and experimenting with just taking old refills of hydrochlorothiazide.   Has been going back and forth with taking lisinopril  Could not sleep with tinnitus , hard to focus with tinnitus         {additonal problems for provider to add (Optional):171424}    Review of Systems   {ROS COMP (Optional):240353}      Objective           Vitals:  No vitals were obtained today due to virtual visit.    Physical Exam   {video visit exam brief selected:546799::\"GENERAL: Healthy, alert and no distress\",\"EYES: Eyes grossly normal to inspection.  No discharge or erythema, or obvious scleral/conjunctival abnormalities.\",\"RESP: No audible wheeze, cough, or visible cyanosis.  No visible retractions or increased work of breathing.  \",\"SKIN: Visible skin clear. No significant rash, abnormal pigmentation or lesions.\",\"NEURO: Cranial nerves grossly intact.  Mentation and speech appropriate for age.\",\"PSYCH: Mentation appears normal, affect normal/bright, judgement and insight intact, normal speech and appearance well-groomed.\"}    {Diagnostic Test Results (Optional):083980}    {AMBULATORY ATTESTATION (Optional):345666}        Video-Visit Details    Type of service:  Video Visit    Video End Time:{video visit start/end time for provider to " "select:549680}    Originating Location (pt. Location): {video visit patient location:311027::\"Home\"}    Distant Location (provider location):  [facility]     Platform used for Video Visit: {Virtual Visit Platforms:033690::\"[facility]\"}  "

## 2021-07-16 NOTE — PROGRESS NOTES
Tova is a 50 year old who is being evaluated via a billable telephone visit.      What phone number would you like to be contacted at? [phone]   How would you like to obtain your AVS? Chacho    Assessment & Plan     Hypertension goal BP (blood pressure) < 140/90  BP elevated as she's been off the Lisinopril because it was really increasing her Tinnitus.  Will add Losartan - see below  - losartan (COZAAR) 50 MG tablet; Take 1 tablet (50 mg) by mouth daily  - hydrochlorothiazide (HYDRODIURIL) 25 MG tablet; Take 1 tablet (25 mg) by mouth daily    Tinnitus, bilateral  Reviewed med list that does not increase tinnitus. Losartan / ARBS are not considered particularly high risk  Will trial alternative as noted  She saw ENT and had an eval and they didn't have major concerns.   - losartan (COZAAR) 50 MG tablet; Take 1 tablet (50 mg) by mouth daily    See me in 4 weeks for BP check and BMP     Return in about 4 weeks (around 8/13/2021) for BP Recheck.    JOSE ALEJANDRO RUIZ PA-C  [facility]   Tova is a 50 year old who presents for the following health issues     HPI     Concern - Tinnitus   Onset: for last two months  Description: switched med in may and noticed new medication lisinopril caused worsening of tinnitus. Causing her issues with sleep and focus. She has been experimenting with starting and stopping the lisinopril-hydrochlorothiazide and just taking hydrochlorothiazide.   Intensity: severe   Progression of Symptoms:  worsening  Accompanying Signs & Symptoms: tinnitus, does have vertigo history  Previous history of similar problem: yes, had tinnitus since she was 8, within last year it has got louder after starting new medication, worse in left ear.   Precipitating factors:        Worsened by: may be due to new rx  Alleviating factors:        Improved by: stopping the lisinopril seemed to help and is wonder if there is another bp med she can try.   Therapies tried and  outcome: stopping the rx helped some, but now bp is high again.     Patient Active Problem List   Diagnosis     Primary osteoarthritis of both knees     GERD (gastroesophageal reflux disease)     SHONDA (obstructive sleep apnea)     Morbid obesity (H)     Tinnitus, unspecified laterality      Current Outpatient Medications   Medication     acetaminophen (TYLENOL) 325 MG tablet     hydrochlorothiazide (HYDRODIURIL) 25 MG tablet     losartan (COZAAR) 50 MG tablet     metroNIDAZOLE (METROGEL) 0.75 % gel     nystatin (MYCOSTATIN) 704633 UNIT/GM external powder     MULTI-VITAMIN OR TABS     No current facility-administered medications for this visit.        Review of Systems   Constitutional, HEENT, cardiovascular, pulmonary, GI, , musculoskeletal, neuro, skin, endocrine and psych systems are negative, except as otherwise noted.      Objective    Vitals - Patient Reported  Systolic (Patient Reported): (!) 140  Diastolic (Patient Reported): 88      Vitals:  No vitals were obtained today due to virtual visit.    Physical Exam   healthy, alert and no distress  PSYCH: Alert and oriented times 3; coherent speech, normal   rate and volume, able to articulate logical thoughts, able   to abstract reason, no tangential thoughts, no hallucinations   or delusions  Her affect is normal  RESP: No cough, no audible wheezing, able to talk in full sentences  Remainder of exam unable to be completed due to telephone visits          Phone call duration: 11 minutes

## 2021-07-19 DIAGNOSIS — H93.13 TINNITUS, BILATERAL: Primary | ICD-10-CM

## 2021-07-19 NOTE — PATIENT INSTRUCTIONS
1. You were seen in the ENT Clinic today by Dr. Nissen.  If you have any questions or concerns after your appointment, please call   - Option 1: ENT Clinic: [phone]   - Option 2: Josey (Dr. Nissen's Nurse): [phone]                   Aan(Dr. Nissen's Nurse): [phone]      2.   Plan to return to clinic as needed    3. MRI Temporal/IAC- will call with results    4. Referral to Oral surgery re: TMJ    5. Medrol pack- follow instructions      Josey Merino LPN  [facility] - Otolaryngology

## 2021-07-20 ENCOUNTER — OFFICE VISIT (OUTPATIENT)
Dept: AUDIOLOGY | Facility: CLINIC | Age: 51
End: 2021-07-20
Payer: COMMERCIAL

## 2021-07-20 ENCOUNTER — OFFICE VISIT (OUTPATIENT)
Dept: OTOLARYNGOLOGY | Facility: CLINIC | Age: 51
End: 2021-07-20
Payer: COMMERCIAL

## 2021-07-20 VITALS
HEART RATE: 85 BPM | DIASTOLIC BLOOD PRESSURE: 98 MMHG | BODY MASS INDEX: 39.33 KG/M2 | WEIGHT: 244.71 LBS | OXYGEN SATURATION: 98 % | HEIGHT: 66 IN | SYSTOLIC BLOOD PRESSURE: 150 MMHG | TEMPERATURE: 98.2 F

## 2021-07-20 DIAGNOSIS — H90.42 SENSORINEURAL HEARING LOSS (SNHL) OF LEFT EAR WITH UNRESTRICTED HEARING OF RIGHT EAR: Primary | ICD-10-CM

## 2021-07-20 DIAGNOSIS — L29.9 EAR ITCHING: ICD-10-CM

## 2021-07-20 DIAGNOSIS — H93.13 TINNITUS, BILATERAL: ICD-10-CM

## 2021-07-20 DIAGNOSIS — M26.609 TMJ (TEMPOROMANDIBULAR JOINT SYNDROME): ICD-10-CM

## 2021-07-20 DIAGNOSIS — H93.13 TINNITUS, BILATERAL: Primary | ICD-10-CM

## 2021-07-20 PROCEDURE — 92550 TYMPANOMETRY & REFLEX THRESH: CPT | Mod: 52 | Performed by: AUDIOLOGIST

## 2021-07-20 PROCEDURE — 92565 STENGER TEST PURE TONE: CPT | Performed by: AUDIOLOGIST

## 2021-07-20 PROCEDURE — 92557 COMPREHENSIVE HEARING TEST: CPT | Performed by: AUDIOLOGIST

## 2021-07-20 PROCEDURE — 99214 OFFICE O/P EST MOD 30 MIN: CPT | Performed by: OTOLARYNGOLOGY

## 2021-07-20 RX ORDER — METHYLPREDNISOLONE 4 MG
TABLET, DOSE PACK ORAL
Qty: 21 TABLET | Refills: 0 | Status: SHIPPED | OUTPATIENT
Start: 2021-07-20 | End: 2021-08-24

## 2021-07-20 ASSESSMENT — PAIN SCALES - GENERAL: PAINLEVEL: NO PAIN (0)

## 2021-07-20 ASSESSMENT — MIFFLIN-ST. JEOR: SCORE: 1746.75

## 2021-07-20 NOTE — PROGRESS NOTES
Dear Alise Mendez:    I had the pleasure of seeing Janelle Byrd in followup today at the [facility].    CHIEF COMPLAINT: Tinnitus    HISTORY OF PRESENT ILLNESS: Patient is a 50-year-old in today for follow-up from her last visit in February.  She began having some dizziness issues last October, she describes as a lightheadedness whenever she is up and moving her head.  VNG was essentially negative from a peripheral standpoint with some central findings.  She has cellulitis in May, was started on lisinopril for blood pressure.  With the lisinopril she began noticing increased left tinnitus and stopped the medication.  The tinnitus has continued.  Feels her hearing is fairly stable but feels it might be increasing difficulty with hearing just secondary to loud noise.  She feels her balance has been okay.  She denies any dysphagia, hoarseness, facial paresthesias.  Does have a lot of itching ears.  The left tinnitus she describes as also a vibration.  She has had issues with left jaw pain, her sister has severe TMJ.  Never had any imaging studies.    MEDICATIONS: Please refer to the detailed medication reconciliation performed by my nurse today, which I have reviewed and signed.     ALLERGIES:    Allergies   Allergen Reactions     Depo-Medrol [Methylprednisolone] Other (See Comments)     Oral thrush after receiving an intra-articular injection, reports that she has family members that have also had this symptom following intra-articular injections.     Dust Mites      Sneezing     Mold Cough     Tape [Adhesive Tape] Rash       HABITS: Social History    Substance and Sexual Activity      Alcohol use: Yes        Comment: 1-2 times per month     History   Smoking Status     Never Smoker   Smokeless Tobacco     Never Used         PAST MEDICAL HISTORY:  Please see today's intake form (for the remainder of the PMH) which I reviewed and signed.  Past Medical History:    Diagnosis Date     Arthritis     Hands, feet, and knees     Benign positional vertigo 2/2017     Complex tear of medial meniscus of right knee as current injury, initial encounter 10/25/2016     Low back pain 12/5/2014     Nephrolithiasis 2000     S/P meniscectomy 12/14/2016     Skin cancer 06/2001    Basal cell carcinoma on nose     Sleep apnea 6/2018     Tinnitus 7/1979     TRANS HYPERTEN-ANTEPART 5/10/2006     Umbilical discharge 6/16/2014       FAMILY HISTORY/SOCIAL HISTORY:    Family History   Problem Relation Age of Onset     Lipids Mother      Arthritis Mother      Psychotic Disorder Mother      Respiratory Mother         copd     Circulatory Mother         BLOOD CLOTS     Genitourinary Problems Mother      Heart Failure Mother      Depression Mother      Mental Illness Mother         Bi-polar     Obesity Mother      Lipids Father      Alcohol/Drug Father      Arthritis Father      C.A.D. Father      Cancer - colorectal Father      Hypertension Father      Cerebrovascular Disease Father         10/2018     Colon Cancer Father      Substance Abuse Father      Anesthesia Reaction Father      Lung Cancer Father      Heart Disease Maternal Grandmother      Breast Cancer Maternal Grandmother      Cerebrovascular Disease Maternal Grandmother      Alcohol/Drug Maternal Grandfather      Cerebrovascular Disease Maternal Grandfather      Alcohol/Drug Paternal Grandmother      Hypertension Paternal Grandmother      C.A.D. Paternal Grandfather      Gastrointestinal Disease Paternal Grandfather         liver diease     Alcohol/Drug Paternal Grandfather      Cerebrovascular Disease Paternal Grandfather      Circulatory Sister         BLOOD CLOTS     Diabetes Brother      Diabetes Sister       Social History     Socioeconomic History     Marital status:      Spouse name: Calvin     Number of children: 3     Years of education: Not on file     Highest education level: Not on file   Occupational History      Occupation: student      Employer: [employer]     Comment: writing instructor   Tobacco Use     Smoking status: Never Smoker     Smokeless tobacco: Never Used   Substance and Sexual Activity     Alcohol use: Yes     Comment: 1-2 times per month     Drug use: No     Sexual activity: Yes     Partners: Male     Birth control/protection: Post-menopausal, Female Surgical     Comment: Tubal ligation 11/08/2008.   Other Topics Concern     Parent/sibling w/ CABG, MI or angioplasty before 65F 55M? Yes     Comment: Father   Social History Narrative    Caffeine intake/servings daily - 1    Calcium intake/servings daily - 2-3    Exercise 2 times weekly - describe walking, treadmill, running    Sunscreen used - Yes    Seatbelts used - Yes    Guns stored in the home - No    Self Breast Exam - Yes    Pap test up to date -  Yes, as of today, 6/7/2010 Normal    Eye exam up to date -  Yes    Dental exam up to date -  Yes    DEXA scan up to date -  Not Applicable    Flex Sig/Colonoscopy up to date -  Not Applicable    Mammography up to date -  Not Applicable    Immunizations reviewed and up to date - Yes, tetanus 3/9/2009    Abuse: Current or Past (Physical, Sexual or Emotional) - No    Do you feel safe in your environment - Yes    Do you cope well with stress - Yes    Do you suffer from insomnia - Yes, staying asleep    Last updated by: Amalia Perez CMA 8/29/2011                                     Social Determinants of Health     Financial Resource Strain:      Difficulty of Paying Living Expenses:    Food Insecurity:      Worried About Running Out of Food in the Last Year:      Ran Out of Food in the Last Year:    Transportation Needs:      Lack of Transportation (Medical):      Lack of Transportation (Non-Medical):    Physical Activity:      Days of Exercise per Week:      Minutes of Exercise per Session:    Stress:      Feeling of Stress :    Social Connections:      Frequency of Communication with Friends and  Family:      Frequency of Social Gatherings with Friends and Family:      Attends Spiritism Services:      Active Member of Clubs or Organizations:      Attends Club or Organization Meetings:      Marital Status:    Intimate Partner Violence: Not At Risk     Fear of Current or Ex-Partner: No     Emotionally Abused: No     Physically Abused: No     Sexually Abused: No       REVIEW OF SYSTEMS: [unfilled]    The remainder of the 10 point ROS is negative    PHYSICIAL EXAMINATION:  Constitutional: The patient was well-groomed and in no acute distress.   Skin: Warm and pink.  Psychiatric: The patient's affect was calm, cooperative, and appropriate.   Respiratory: Breathing comfortably without stridor or exertion of accessory muscles.  Eyes: Pupils were equal and reactive. Extraocular movement intact.   Head: Normocephalic and atraumatic. No lesions or scars.  Ears: .eaq  Nose: Sinuses were nontender. Anterior rhinoscopy revealed midline septum and absence of purulence or polyps.  Oral Cavity: Normal tongue, floor of mouth, buccal mucosa, and palate. No abnormal lymph tissue in the oropharynx.   Neck: The parotid is soft without masses. Supple with normal laryngeal and tracheal landmarks.   Lymphatic: There is no palpable lymphadenopathy or other masses in the neck.   Neurologic: Alert and oriented x 3. Cranial nerves III-XI within normal limits. Voice quality normal.  Cerebellar Function Tests:  Grossly normal  TMJ: Palpation TMJ does elicit some tenderness on the left side with opening and closing.    Audiogram: Audiogram performed shows mild left low-frequency sensorineural hearing loss, excellent discrimination at 96% on the right and 100% on the left.    IMPRESSION AND PLAN:   1. Left tinnitus: She describes this as quite problematic for her, difficulty sleeping etc.  She is not had any imaging studies with any distal left tinnitus I am going to get an MRI scan, she says will have to be open sided.  She will make sure  there is no retrocochlear cause.  Also can have her see oral surgery if that is negative for assessment and possible TMJ standpoint.  Ear itself looks fine and not sure have any thing for the tinnitus if the MRI is negative.  Discussed masking issues which she can trial.  Monitor.  2. Ear itching: Not problematic this time, monitor.  Cleaned of cerumen today, no further treatment needed, monitor.  3. Left TMJ, to see oral surgery for their assessment.    Thank you very much for the opportunity to participate in the care of your patient.    Rick L Nissen MD

## 2021-07-20 NOTE — LETTER
7/20/2021      RE: Janelle Byrd  [address] [place] [address]       Dear Alise Mendez:    I had the pleasure of seeing Janelle Byrd in followup today at the [facility].    CHIEF COMPLAINT: Tinnitus    HISTORY OF PRESENT ILLNESS: Patient is a 50-year-old in today for follow-up from her last visit in February.  She began having some dizziness issues last October, she describes as a lightheadedness whenever she is up and moving her head.  VNG was essentially negative from a peripheral standpoint with some central findings.  She has cellulitis in May, was started on lisinopril for blood pressure.  With the lisinopril she began noticing increased left tinnitus and stopped the medication.  The tinnitus has continued.  Feels her hearing is fairly stable but feels it might be increasing difficulty with hearing just secondary to loud noise.  She feels her balance has been okay.  She denies any dysphagia, hoarseness, facial paresthesias.  Does have a lot of itching ears.  The left tinnitus she describes as also a vibration.  She has had issues with left jaw pain, her sister has severe TMJ.  Never had any imaging studies.    MEDICATIONS: Please refer to the detailed medication reconciliation performed by my nurse today, which I have reviewed and signed.     ALLERGIES:    Allergies   Allergen Reactions     Depo-Medrol [Methylprednisolone] Other (See Comments)     Oral thrush after receiving an intra-articular injection, reports that she has family members that have also had this symptom following intra-articular injections.     Dust Mites      Sneezing     Mold Cough     Tape [Adhesive Tape] Rash       HABITS: Social History    Substance and Sexual Activity      Alcohol use: Yes        Comment: 1-2 times per month     History   Smoking Status     Never Smoker   Smokeless Tobacco     Never Used         PAST MEDICAL HISTORY:  Please see  today's intake form (for the remainder of the PMH) which I reviewed and signed.  Past Medical History:   Diagnosis Date     Arthritis     Hands, feet, and knees     Benign positional vertigo 2/2017     Complex tear of medial meniscus of right knee as current injury, initial encounter 10/25/2016     Low back pain 12/5/2014     Nephrolithiasis 2000     S/P meniscectomy 12/14/2016     Skin cancer 06/2001    Basal cell carcinoma on nose     Sleep apnea 6/2018     Tinnitus 7/1979     TRANS HYPERTEN-ANTEPART 5/10/2006     Umbilical discharge 6/16/2014       FAMILY HISTORY/SOCIAL HISTORY:    Family History   Problem Relation Age of Onset     Lipids Mother      Arthritis Mother      Psychotic Disorder Mother      Respiratory Mother         copd     Circulatory Mother         BLOOD CLOTS     Genitourinary Problems Mother      Heart Failure Mother      Depression Mother      Mental Illness Mother         Bi-polar     Obesity Mother      Lipids Father      Alcohol/Drug Father      Arthritis Father      C.A.D. Father      Cancer - colorectal Father      Hypertension Father      Cerebrovascular Disease Father         10/2018     Colon Cancer Father      Substance Abuse Father      Anesthesia Reaction Father      Lung Cancer Father      Heart Disease Maternal Grandmother      Breast Cancer Maternal Grandmother      Cerebrovascular Disease Maternal Grandmother      Alcohol/Drug Maternal Grandfather      Cerebrovascular Disease Maternal Grandfather      Alcohol/Drug Paternal Grandmother      Hypertension Paternal Grandmother      C.A.D. Paternal Grandfather      Gastrointestinal Disease Paternal Grandfather         liver diease     Alcohol/Drug Paternal Grandfather      Cerebrovascular Disease Paternal Grandfather      Circulatory Sister         BLOOD CLOTS     Diabetes Brother      Diabetes Sister       Social History     Socioeconomic History     Marital status:      Spouse name: Calvin     Number of children: 3     Years  of education: Not on file     Highest education level: Not on file   Occupational History     Occupation: student      Employer: [employer]     Comment: writing instructor   Tobacco Use     Smoking status: Never Smoker     Smokeless tobacco: Never Used   Substance and Sexual Activity     Alcohol use: Yes     Comment: 1-2 times per month     Drug use: No     Sexual activity: Yes     Partners: Male     Birth control/protection: Post-menopausal, Female Surgical     Comment: Tubal ligation 11/08/2008.   Other Topics Concern     Parent/sibling w/ CABG, MI or angioplasty before 65F 55M? Yes     Comment: Father   Social History Narrative    Caffeine intake/servings daily - 1    Calcium intake/servings daily - 2-3    Exercise 2 times weekly - describe walking, treadmill, running    Sunscreen used - Yes    Seatbelts used - Yes    Guns stored in the home - No    Self Breast Exam - Yes    Pap test up to date -  Yes, as of today, 6/7/2010 Normal    Eye exam up to date -  Yes    Dental exam up to date -  Yes    DEXA scan up to date -  Not Applicable    Flex Sig/Colonoscopy up to date -  Not Applicable    Mammography up to date -  Not Applicable    Immunizations reviewed and up to date - Yes, tetanus 3/9/2009    Abuse: Current or Past (Physical, Sexual or Emotional) - No    Do you feel safe in your environment - Yes    Do you cope well with stress - Yes    Do you suffer from insomnia - Yes, staying asleep    Last updated by: Amalia Perez [facility] 8/29/2011                                     Social Determinants of Health     Financial Resource Strain:      Difficulty of Paying Living Expenses:    Food Insecurity:      Worried About Running Out of Food in the Last Year:      Ran Out of Food in the Last Year:    Transportation Needs:      Lack of Transportation (Medical):      Lack of Transportation (Non-Medical):    Physical Activity:      Days of Exercise per Week:      Minutes of Exercise per Session:    Stress:       Feeling of Stress :    Social Connections:      Frequency of Communication with Friends and Family:      Frequency of Social Gatherings with Friends and Family:      Attends Religion Services:      Active Member of Clubs or Organizations:      Attends Club or Organization Meetings:      Marital Status:    Intimate Partner Violence: Not At Risk     Fear of Current or Ex-Partner: No     Emotionally Abused: No     Physically Abused: No     Sexually Abused: No       REVIEW OF SYSTEMS: [unfilled]    The remainder of the 10 point ROS is negative    PHYSICIAL EXAMINATION:  Constitutional: The patient was well-groomed and in no acute distress.   Skin: Warm and pink.  Psychiatric: The patient's affect was calm, cooperative, and appropriate.   Respiratory: Breathing comfortably without stridor or exertion of accessory muscles.  Eyes: Pupils were equal and reactive. Extraocular movement intact.   Head: Normocephalic and atraumatic. No lesions or scars.  Ears: .eaq  Nose: Sinuses were nontender. Anterior rhinoscopy revealed midline septum and absence of purulence or polyps.  Oral Cavity: Normal tongue, floor of mouth, buccal mucosa, and palate. No abnormal lymph tissue in the oropharynx.   Neck: The parotid is soft without masses. Supple with normal laryngeal and tracheal landmarks.   Lymphatic: There is no palpable lymphadenopathy or other masses in the neck.   Neurologic: Alert and oriented x 3. Cranial nerves III-XI within normal limits. Voice quality normal.  Cerebellar Function Tests:  Grossly normal  TMJ: Palpation TMJ does elicit some tenderness on the left side with opening and closing.    Audiogram: Audiogram performed shows mild left low-frequency sensorineural hearing loss, excellent discrimination at 96% on the right and 100% on the left.    IMPRESSION AND PLAN:   1. Left tinnitus: She describes this as quite problematic for her, difficulty sleeping etc.  She is not had any imaging studies with any distal left  tinnitus I am going to get an MRI scan, she says will have to be open sided.  She will make sure there is no retrocochlear cause.  Also can have her see oral surgery if that is negative for assessment and possible TMJ standpoint.  Ear itself looks fine and not sure have any thing for the tinnitus if the MRI is negative.  Discussed masking issues which she can trial.  Monitor.  2. Ear itching: Not problematic this time, monitor.  Cleaned of cerumen today, no further treatment needed, monitor.  3. Left TMJ, to see oral surgery for their assessment.    Thank you very much for the opportunity to participate in the care of your patient.    Rick L Nissen MD

## 2021-07-20 NOTE — PROGRESS NOTES
AUDIOLOGY REPORT    SUMMARY: Audiology visit completed. See audiogram for results.      RECOMMENDATIONS: Follow-up with ENT.    Teena Smith CCC-A  Licensed Audiologist   [place] #[id number]

## 2021-07-20 NOTE — LETTER
7/20/2021       RE: Janelle Byrd  [address] [place] [address]     Dear Colleague,    Thank you for referring your patient, Janelle Byrd, to the [facility] [place] [facility]. Please see a copy of my visit note below.    Dear Alise Mendez:    I had the pleasure of seeing Janelle Byrd in followup today at the [facility].    CHIEF COMPLAINT: Tinnitus    HISTORY OF PRESENT ILLNESS: Patient is a 50-year-old in today for follow-up from her last visit in February.  She began having some dizziness issues last October, she describes as a lightheadedness whenever she is up and moving her head.  VNG was essentially negative from a peripheral standpoint with some central findings.  She has cellulitis in May, was started on lisinopril for blood pressure.  With the lisinopril she began noticing increased left tinnitus and stopped the medication.  The tinnitus has continued.  Feels her hearing is fairly stable but feels it might be increasing difficulty with hearing just secondary to loud noise.  She feels her balance has been okay.  She denies any dysphagia, hoarseness, facial paresthesias.  Does have a lot of itching ears.  The left tinnitus she describes as also a vibration.  She has had issues with left jaw pain, her sister has severe TMJ.  Never had any imaging studies.    MEDICATIONS: Please refer to the detailed medication reconciliation performed by my nurse today, which I have reviewed and signed.     ALLERGIES:    Allergies   Allergen Reactions     Depo-Medrol [Methylprednisolone] Other (See Comments)     Oral thrush after receiving an intra-articular injection, reports that she has family members that have also had this symptom following intra-articular injections.     Dust Mites      Sneezing     Mold Cough     Tape [Adhesive  Tape] Rash       HABITS: Social History    Substance and Sexual Activity      Alcohol use: Yes        Comment: 1-2 times per month     History   Smoking Status     Never Smoker   Smokeless Tobacco     Never Used         PAST MEDICAL HISTORY:  Please see today's intake form (for the remainder of the PMH) which I reviewed and signed.  Past Medical History:   Diagnosis Date     Arthritis     Hands, feet, and knees     Benign positional vertigo 2/2017     Complex tear of medial meniscus of right knee as current injury, initial encounter 10/25/2016     Low back pain 12/5/2014     Nephrolithiasis 2000     S/P meniscectomy 12/14/2016     Skin cancer 06/2001    Basal cell carcinoma on nose     Sleep apnea 6/2018     Tinnitus 7/1979     TRANS HYPERTEN-ANTEPART 5/10/2006     Umbilical discharge 6/16/2014       FAMILY HISTORY/SOCIAL HISTORY:    Family History   Problem Relation Age of Onset     Lipids Mother      Arthritis Mother      Psychotic Disorder Mother      Respiratory Mother         copd     Circulatory Mother         BLOOD CLOTS     Genitourinary Problems Mother      Heart Failure Mother      Depression Mother      Mental Illness Mother         Bi-polar     Obesity Mother      Lipids Father      Alcohol/Drug Father      Arthritis Father      C.A.D. Father      Cancer - colorectal Father      Hypertension Father      Cerebrovascular Disease Father         10/2018     Colon Cancer Father      Substance Abuse Father      Anesthesia Reaction Father      Lung Cancer Father      Heart Disease Maternal Grandmother      Breast Cancer Maternal Grandmother      Cerebrovascular Disease Maternal Grandmother      Alcohol/Drug Maternal Grandfather      Cerebrovascular Disease Maternal Grandfather      Alcohol/Drug Paternal Grandmother      Hypertension Paternal Grandmother      C.A.D. Paternal Grandfather      Gastrointestinal Disease Paternal Grandfather         liver diease     Alcohol/Drug Paternal Grandfather       Cerebrovascular Disease Paternal Grandfather      Circulatory Sister         BLOOD CLOTS     Diabetes Brother      Diabetes Sister       Social History     Socioeconomic History     Marital status:      Spouse name: Calvin     Number of children: 3     Years of education: Not on file     Highest education level: Not on file   Occupational History     Occupation: student      Employer: [employer]     Comment: writing instructor   Tobacco Use     Smoking status: Never Smoker     Smokeless tobacco: Never Used   Substance and Sexual Activity     Alcohol use: Yes     Comment: 1-2 times per month     Drug use: No     Sexual activity: Yes     Partners: Male     Birth control/protection: Post-menopausal, Female Surgical     Comment: Tubal ligation 11/08/2008.   Other Topics Concern     Parent/sibling w/ CABG, MI or angioplasty before 65F 55M? Yes     Comment: Father   Social History Narrative    Caffeine intake/servings daily - 1    Calcium intake/servings daily - 2-3    Exercise 2 times weekly - describe walking, treadmill, running    Sunscreen used - Yes    Seatbelts used - Yes    Guns stored in the home - No    Self Breast Exam - Yes    Pap test up to date -  Yes, as of today, 6/7/2010 Normal    Eye exam up to date -  Yes    Dental exam up to date -  Yes    DEXA scan up to date -  Not Applicable    Flex Sig/Colonoscopy up to date -  Not Applicable    Mammography up to date -  Not Applicable    Immunizations reviewed and up to date - Yes, tetanus 3/9/2009    Abuse: Current or Past (Physical, Sexual or Emotional) - No    Do you feel safe in your environment - Yes    Do you cope well with stress - Yes    Do you suffer from insomnia - Yes, staying asleep    Last updated by: Amalia Perez CMA 8/29/2011                                     Social Determinants of Health     Financial Resource Strain:      Difficulty of Paying Living Expenses:    Food Insecurity:      Worried About Running Out of Food in the  Last Year:      Ran Out of Food in the Last Year:    Transportation Needs:      Lack of Transportation (Medical):      Lack of Transportation (Non-Medical):    Physical Activity:      Days of Exercise per Week:      Minutes of Exercise per Session:    Stress:      Feeling of Stress :    Social Connections:      Frequency of Communication with Friends and Family:      Frequency of Social Gatherings with Friends and Family:      Attends Mosque Services:      Active Member of Clubs or Organizations:      Attends Club or Organization Meetings:      Marital Status:    Intimate Partner Violence: Not At Risk     Fear of Current or Ex-Partner: No     Emotionally Abused: No     Physically Abused: No     Sexually Abused: No       REVIEW OF SYSTEMS: [unfilled]    The remainder of the 10 point ROS is negative    PHYSICIAL EXAMINATION:  Constitutional: The patient was well-groomed and in no acute distress.   Skin: Warm and pink.  Psychiatric: The patient's affect was calm, cooperative, and appropriate.   Respiratory: Breathing comfortably without stridor or exertion of accessory muscles.  Eyes: Pupils were equal and reactive. Extraocular movement intact.   Head: Normocephalic and atraumatic. No lesions or scars.  Ears: .eaq  Nose: Sinuses were nontender. Anterior rhinoscopy revealed midline septum and absence of purulence or polyps.  Oral Cavity: Normal tongue, floor of mouth, buccal mucosa, and palate. No abnormal lymph tissue in the oropharynx.   Neck: The parotid is soft without masses. Supple with normal laryngeal and tracheal landmarks.   Lymphatic: There is no palpable lymphadenopathy or other masses in the neck.   Neurologic: Alert and oriented x 3. Cranial nerves III-XI within normal limits. Voice quality normal.  Cerebellar Function Tests:  Grossly normal  TMJ: Palpation TMJ does elicit some tenderness on the left side with opening and closing.    Audiogram: Audiogram performed shows mild left low-frequency  sensorineural hearing loss, excellent discrimination at 96% on the right and 100% on the left.    IMPRESSION AND PLAN:   1. Left tinnitus: She describes this as quite problematic for her, difficulty sleeping etc.  She is not had any imaging studies with any distal left tinnitus I am going to get an MRI scan, she says will have to be open sided.  She will make sure there is no retrocochlear cause.  Also can have her see oral surgery if that is negative for assessment and possible TMJ standpoint.  Ear itself looks fine and not sure have any thing for the tinnitus if the MRI is negative.  Discussed masking issues which she can trial.  Monitor.  2. Ear itching: Not problematic this time, monitor.  Cleaned of cerumen today, no further treatment needed, monitor.  3. Left TMJ, to see oral surgery for their assessment.    Thank you very much for the opportunity to participate in the care of your patient.    Rick L Nissen MD              Again, thank you for allowing

## 2021-07-20 NOTE — NURSING NOTE
"Chief Complaint   Patient presents with     RECHECK     follow up      Blood pressure (!) 150/98, pulse 85, temperature 98.2  F (36.8  C), height 1.676 m (5' 6\"), weight 111 kg (244 lb 11.4 oz), last menstrual period 05/31/2016, SpO2 98 %, not currently breastfeeding.    Ramiro Gerardo LPN    "

## 2021-07-29 ENCOUNTER — TRANSFERRED RECORDS (OUTPATIENT)
Dept: HEALTH INFORMATION MANAGEMENT | Facility: CLINIC | Age: 51
End: 2021-07-29

## 2021-08-05 ENCOUNTER — TELEPHONE (OUTPATIENT)
Dept: OTOLARYNGOLOGY | Facility: CLINIC | Age: 51
End: 2021-08-05

## 2021-08-05 NOTE — TELEPHONE ENCOUNTER
[facility]    Phone Message    May a detailed message be left on voicemail: yes     Reason for Call: Requesting Results    Name/type of test:MRI IMAGING - HIM SCAN  Date of test: 7/29/2021  Was test done at a location other than [facility]: Yes, writer was unable to get location from Pt. Called Pt back but there was no answer. Pt advised during the initial call that she was advised she would receive regarding the MRI scan results.      Action Taken: Message routed to:  [facility] ENT    Travel Screening: Not Applicable

## 2021-08-05 NOTE — TELEPHONE ENCOUNTER
Let the pt know we got the report but waiting on the images. Will call her on Tuesday with the report from Dr. Nissen.     Josey Merino LPN

## 2021-08-05 NOTE — TELEPHONE ENCOUNTER
Records received 08/05/21    Facility  [facility]    Outcome 7/29/21 MR Brain report received and scanned in Epic, images were requested and received in PACS

## 2021-08-10 NOTE — TELEPHONE ENCOUNTER
Per Nissen, MRI is normal. He is wondering if her tinnitus is ringing or pulsating? She says she is having some buzzing and echo in the ear. She is wondering it is fluid in her ear? It gets better thru out the day. Nights are rough for her. I will get back to her after discussing with Dr. Nissen.      Per Dr. Nissen, this is not fluid related as her ear exam was normal and her tymps were normal. Pt was notified. No further questions.      Josey Merino LPN

## 2021-08-23 ENCOUNTER — TELEPHONE (OUTPATIENT)
Dept: OTOLARYNGOLOGY | Facility: CLINIC | Age: 51
End: 2021-08-23

## 2021-08-23 DIAGNOSIS — H93.13 TINNITUS, BILATERAL: ICD-10-CM

## 2021-08-23 NOTE — TELEPHONE ENCOUNTER
Pt called and her buzzing is bad. Is wondering if it is inflammation? Wondering if she could have another round of steroids.    Has had some decreased hearing when the buzzing is going on. No discharger of the ear. She also tried a decongestant, sleeping with the fan on/music but it makes it worse. She also has tried aspirin to help with the inflammation and it helped a little. Increased sound makes her symptoms worse.        I will discuss this with Dr. Nissen when he is in clinic tomorrow. Pt is agreeable to the plan.    Josey Merino LPN

## 2021-08-24 RX ORDER — METHYLPREDNISOLONE 4 MG
TABLET, DOSE PACK ORAL
Qty: 21 TABLET | Refills: 0 | Status: SHIPPED | OUTPATIENT
Start: 2021-08-24 | End: 2021-10-25

## 2021-08-24 NOTE — TELEPHONE ENCOUNTER
Dr. Nissen agreed to another round of steroids. Will let us know if no improvement in a couple weeks. Then we will get her in to be rechecked out.    Josey Merino LPN

## 2021-09-18 ENCOUNTER — HEALTH MAINTENANCE LETTER (OUTPATIENT)
Age: 51
End: 2021-09-18

## 2021-10-01 ENCOUNTER — TRANSFERRED RECORDS (OUTPATIENT)
Dept: MULTI SPECIALTY CLINIC | Facility: CLINIC | Age: 51
End: 2021-10-01
Payer: COMMERCIAL

## 2021-10-17 ENCOUNTER — APPOINTMENT (OUTPATIENT)
Dept: CT IMAGING | Facility: CLINIC | Age: 51
End: 2021-10-17
Attending: EMERGENCY MEDICINE
Payer: COMMERCIAL

## 2021-10-17 ENCOUNTER — HOSPITAL ENCOUNTER (EMERGENCY)
Facility: CLINIC | Age: 51
Discharge: HOME | End: 2021-10-17
Attending: EMERGENCY MEDICINE | Admitting: EMERGENCY MEDICINE
Payer: COMMERCIAL

## 2021-10-17 VITALS
BODY MASS INDEX: 39.65 KG/M2 | OXYGEN SATURATION: 95 % | DIASTOLIC BLOOD PRESSURE: 88 MMHG | TEMPERATURE: 97.7 F | WEIGHT: 238 LBS | HEART RATE: 69 BPM | RESPIRATION RATE: 20 BRPM | HEIGHT: 65 IN | SYSTOLIC BLOOD PRESSURE: 130 MMHG

## 2021-10-17 DIAGNOSIS — R10.9 RIGHT FLANK PAIN: ICD-10-CM

## 2021-10-17 DIAGNOSIS — R10.84 ABDOMINAL PAIN, GENERALIZED: ICD-10-CM

## 2021-10-17 LAB
ALBUMIN SERPL-MCNC: 4.2 G/DL (ref 3.4–5)
ALBUMIN UR-MCNC: NEGATIVE MG/DL
ALP SERPL-CCNC: 129 U/L (ref 40–150)
ALT SERPL W P-5'-P-CCNC: 32 U/L (ref 0–50)
ANION GAP SERPL CALCULATED.3IONS-SCNC: 6 MMOL/L (ref 3–14)
APPEARANCE UR: CLEAR
AST SERPL W P-5'-P-CCNC: 16 U/L (ref 0–45)
BASOPHILS # BLD AUTO: 0.1 10E3/UL (ref 0–0.2)
BASOPHILS NFR BLD AUTO: 1 %
BILIRUB SERPL-MCNC: 0.6 MG/DL (ref 0.2–1.3)
BILIRUB UR QL STRIP: NEGATIVE
BUN SERPL-MCNC: 10 MG/DL (ref 7–30)
CALCIUM SERPL-MCNC: 9.7 MG/DL (ref 8.5–10.1)
CHLORIDE BLD-SCNC: 106 MMOL/L (ref 94–109)
CO2 SERPL-SCNC: 27 MMOL/L (ref 20–32)
COLOR UR AUTO: ABNORMAL
CREAT SERPL-MCNC: 0.77 MG/DL (ref 0.52–1.04)
EOSINOPHIL # BLD AUTO: 0 10E3/UL (ref 0–0.7)
EOSINOPHIL NFR BLD AUTO: 0 %
ERYTHROCYTE [DISTWIDTH] IN BLOOD BY AUTOMATED COUNT: 12.3 % (ref 10–15)
GFR SERPL CREATININE-BSD FRML MDRD: 90 ML/MIN/1.73M2
GLUCOSE BLD-MCNC: 111 MG/DL (ref 70–99)
GLUCOSE UR STRIP-MCNC: NEGATIVE MG/DL
HCT VFR BLD AUTO: 45.2 % (ref 35–47)
HGB BLD-MCNC: 15.2 G/DL (ref 11.7–15.7)
HGB UR QL STRIP: NEGATIVE
HOLD SPECIMEN: NORMAL
IMM GRANULOCYTES # BLD: 0 10E3/UL
IMM GRANULOCYTES NFR BLD: 0 %
KETONES UR STRIP-MCNC: NEGATIVE MG/DL
LEUKOCYTE ESTERASE UR QL STRIP: ABNORMAL
LIPASE SERPL-CCNC: 95 U/L (ref 73–393)
LYMPHOCYTES # BLD AUTO: 2.7 10E3/UL (ref 0.8–5.3)
LYMPHOCYTES NFR BLD AUTO: 26 %
MCH RBC QN AUTO: 28.9 PG (ref 26.5–33)
MCHC RBC AUTO-ENTMCNC: 33.6 G/DL (ref 31.5–36.5)
MCV RBC AUTO: 86 FL (ref 78–100)
MONOCYTES # BLD AUTO: 0.5 10E3/UL (ref 0–1.3)
MONOCYTES NFR BLD AUTO: 5 %
MUCOUS THREADS #/AREA URNS LPF: PRESENT /LPF
NEUTROPHILS # BLD AUTO: 7 10E3/UL (ref 1.6–8.3)
NEUTROPHILS NFR BLD AUTO: 68 %
NITRATE UR QL: NEGATIVE
NRBC # BLD AUTO: 0 10E3/UL
NRBC BLD AUTO-RTO: 0 /100
PH UR STRIP: 7 [PH] (ref 5–7)
PLATELET # BLD AUTO: 393 10E3/UL (ref 150–450)
POTASSIUM BLD-SCNC: 2.9 MMOL/L (ref 3.4–5.3)
PROT SERPL-MCNC: 8.2 G/DL (ref 6.8–8.8)
RBC # BLD AUTO: 5.26 10E6/UL (ref 3.8–5.2)
RBC URINE: 1 /HPF
SODIUM SERPL-SCNC: 139 MMOL/L (ref 133–144)
SP GR UR STRIP: 1.04 (ref 1–1.03)
SQUAMOUS EPITHELIAL: <1 /HPF
TRANSITIONAL EPI: <1 /HPF
UROBILINOGEN UR STRIP-MCNC: NORMAL MG/DL
WBC # BLD AUTO: 10.3 10E3/UL (ref 4–11)
WBC URINE: 14 /HPF

## 2021-10-17 PROCEDURE — 83690 ASSAY OF LIPASE: CPT | Performed by: EMERGENCY MEDICINE

## 2021-10-17 PROCEDURE — 74177 CT ABD & PELVIS W/CONTRAST: CPT | Mod: 26 | Performed by: RADIOLOGY

## 2021-10-17 PROCEDURE — 80053 COMPREHEN METABOLIC PANEL: CPT | Performed by: EMERGENCY MEDICINE

## 2021-10-17 PROCEDURE — 85025 COMPLETE CBC W/AUTO DIFF WBC: CPT | Performed by: EMERGENCY MEDICINE

## 2021-10-17 PROCEDURE — 36415 COLL VENOUS BLD VENIPUNCTURE: CPT | Performed by: EMERGENCY MEDICINE

## 2021-10-17 PROCEDURE — 250N000013 HC RX MED GY IP 250 OP 250 PS 637: Performed by: EMERGENCY MEDICINE

## 2021-10-17 PROCEDURE — 250N000011 HC RX IP 250 OP 636: Performed by: EMERGENCY MEDICINE

## 2021-10-17 PROCEDURE — 99285 EMERGENCY DEPT VISIT HI MDM: CPT | Mod: 25

## 2021-10-17 PROCEDURE — 81001 URINALYSIS AUTO W/SCOPE: CPT | Performed by: EMERGENCY MEDICINE

## 2021-10-17 PROCEDURE — 87086 URINE CULTURE/COLONY COUNT: CPT | Performed by: EMERGENCY MEDICINE

## 2021-10-17 PROCEDURE — 99284 EMERGENCY DEPT VISIT MOD MDM: CPT | Performed by: EMERGENCY MEDICINE

## 2021-10-17 PROCEDURE — 74177 CT ABD & PELVIS W/CONTRAST: CPT

## 2021-10-17 RX ORDER — POTASSIUM CHLORIDE 1500 MG/1
50 TABLET, EXTENDED RELEASE ORAL ONCE
Status: DISCONTINUED | OUTPATIENT
Start: 2021-10-17 | End: 2021-10-17

## 2021-10-17 RX ORDER — IOPAMIDOL 755 MG/ML
135 INJECTION, SOLUTION INTRAVASCULAR ONCE
Status: COMPLETED | OUTPATIENT
Start: 2021-10-17 | End: 2021-10-17

## 2021-10-17 RX ORDER — POTASSIUM CHLORIDE 750 MG/1
50 TABLET, EXTENDED RELEASE ORAL ONCE
Status: COMPLETED | OUTPATIENT
Start: 2021-10-17 | End: 2021-10-17

## 2021-10-17 RX ADMIN — POTASSIUM CHLORIDE 50 MEQ: 750 TABLET, EXTENDED RELEASE ORAL at 21:53

## 2021-10-17 RX ADMIN — IOPAMIDOL 135 ML: 755 INJECTION, SOLUTION INTRAVENOUS at 20:38

## 2021-10-17 ASSESSMENT — ENCOUNTER SYMPTOMS
POLYDIPSIA: 0
VOMITING: 1
CONFUSION: 0
FLANK PAIN: 1
BRUISES/BLEEDS EASILY: 0
ARTHRALGIAS: 1
COLOR CHANGE: 0
NECK STIFFNESS: 0
SHORTNESS OF BREATH: 0
DIFFICULTY URINATING: 0
ABDOMINAL PAIN: 1
CHILLS: 0
EYE REDNESS: 0
NAUSEA: 1
FEVER: 0
HEADACHES: 0
ADENOPATHY: 0

## 2021-10-17 ASSESSMENT — MIFFLIN-ST. JEOR: SCORE: 1695.44

## 2021-10-17 NOTE — ED TRIAGE NOTES
Triage Assessment & Note:    Patient presents with: PT c/o pain under left ribs and right shoulder pain x 4 days. PT also reports bright yellow stools. PT endorses nausea without vomiting.     Home Treatments/Remedies: None    Febrile / Afebrile? Afebrile     Duration of C/o: 3-4 days     David Garber RN  October 17, 2021

## 2021-10-18 ENCOUNTER — APPOINTMENT (OUTPATIENT)
Dept: URGENT CARE | Facility: CLINIC | Age: 51
End: 2021-10-18
Payer: COMMERCIAL

## 2021-10-18 ENCOUNTER — OFFICE VISIT (OUTPATIENT)
Dept: FAMILY MEDICINE | Facility: CLINIC | Age: 51
End: 2021-10-18
Payer: COMMERCIAL

## 2021-10-18 VITALS
HEART RATE: 79 BPM | SYSTOLIC BLOOD PRESSURE: 112 MMHG | HEIGHT: 65 IN | WEIGHT: 237 LBS | BODY MASS INDEX: 39.49 KG/M2 | TEMPERATURE: 98.8 F | RESPIRATION RATE: 18 BRPM | DIASTOLIC BLOOD PRESSURE: 88 MMHG | OXYGEN SATURATION: 95 %

## 2021-10-18 DIAGNOSIS — R10.11 RUQ ABDOMINAL PAIN: Primary | ICD-10-CM

## 2021-10-18 DIAGNOSIS — E66.01 MORBID OBESITY (H): ICD-10-CM

## 2021-10-18 DIAGNOSIS — R19.7 DIARRHEA, UNSPECIFIED TYPE: ICD-10-CM

## 2021-10-18 DIAGNOSIS — R11.0 NAUSEA: ICD-10-CM

## 2021-10-18 LAB
BACTERIA UR CULT: NORMAL
BASOPHILS # BLD AUTO: 0 10E3/UL (ref 0–0.2)
BASOPHILS NFR BLD AUTO: 0 %
EOSINOPHIL # BLD AUTO: 0 10E3/UL (ref 0–0.7)
EOSINOPHIL NFR BLD AUTO: 0 %
ERYTHROCYTE [DISTWIDTH] IN BLOOD BY AUTOMATED COUNT: 13.1 % (ref 10–15)
HCT VFR BLD AUTO: 44.9 % (ref 35–47)
HGB BLD-MCNC: 14.9 G/DL (ref 11.7–15.7)
LYMPHOCYTES # BLD AUTO: 2.4 10E3/UL (ref 0.8–5.3)
LYMPHOCYTES NFR BLD AUTO: 21 %
MCH RBC QN AUTO: 29 PG (ref 26.5–33)
MCHC RBC AUTO-ENTMCNC: 33.2 G/DL (ref 31.5–36.5)
MCV RBC AUTO: 87 FL (ref 78–100)
MONOCYTES # BLD AUTO: 0.6 10E3/UL (ref 0–1.3)
MONOCYTES NFR BLD AUTO: 5 %
NEUTROPHILS # BLD AUTO: 8.2 10E3/UL (ref 1.6–8.3)
NEUTROPHILS NFR BLD AUTO: 73 %
PLATELET # BLD AUTO: 367 10E3/UL (ref 150–450)
POTASSIUM BLD-SCNC: 3.7 MMOL/L (ref 3.4–5.3)
RBC # BLD AUTO: 5.14 10E6/UL (ref 3.8–5.2)
WBC # BLD AUTO: 11.3 10E3/UL (ref 4–11)

## 2021-10-18 PROCEDURE — U0005 INFEC AGEN DETEC AMPLI PROBE: HCPCS | Performed by: FAMILY MEDICINE

## 2021-10-18 PROCEDURE — U0003 INFECTIOUS AGENT DETECTION BY NUCLEIC ACID (DNA OR RNA); SEVERE ACUTE RESPIRATORY SYNDROME CORONAVIRUS 2 (SARS-COV-2) (CORONAVIRUS DISEASE [COVID-19]), AMPLIFIED PROBE TECHNIQUE, MAKING USE OF HIGH THROUGHPUT TECHNOLOGIES AS DESCRIBED BY CMS-2020-01-R: HCPCS | Performed by: FAMILY MEDICINE

## 2021-10-18 PROCEDURE — 36415 COLL VENOUS BLD VENIPUNCTURE: CPT | Performed by: FAMILY MEDICINE

## 2021-10-18 PROCEDURE — 99214 OFFICE O/P EST MOD 30 MIN: CPT | Performed by: FAMILY MEDICINE

## 2021-10-18 PROCEDURE — 84132 ASSAY OF SERUM POTASSIUM: CPT | Performed by: FAMILY MEDICINE

## 2021-10-18 PROCEDURE — 85025 COMPLETE CBC W/AUTO DIFF WBC: CPT | Performed by: FAMILY MEDICINE

## 2021-10-18 RX ORDER — ONDANSETRON 4 MG/1
8 TABLET, ORALLY DISINTEGRATING ORAL EVERY 8 HOURS PRN
Qty: 12 TABLET | Refills: 0 | Status: SHIPPED | OUTPATIENT
Start: 2021-10-18 | End: 2021-11-24

## 2021-10-18 ASSESSMENT — MIFFLIN-ST. JEOR: SCORE: 1690.9

## 2021-10-18 NOTE — DISCHARGE INSTRUCTIONS
Please make an appointment to follow up with Your Primary Care Provider in 2-3 days for further evaluation and recommendations.  Please take Tylenol for pain, if needed.  If your symptoms significantly worsen or you develop a fever 100.4  F or higher please come back to the emergency department.    *ABDOMINAL PAIN, UNCERTAIN CAUSE [Female]        Based on your visit today, the exact cause of your abdominal (stomach) pain is not certain. Your condition does not seem serious now; however, the signs of a serious problem may take more time to appear. Therefore, it is important for you to watch for any new symptoms or worsening of your condition.  HOME CARE:  Rest until your next exam. No strenuous activities.  Eat a diet low in fiber (called a low-residue diet). Foods allowed include refined breads, white rice, fruit and vegetable juices without pulp, tender meats. These foods will pass more easily through the intestine.  Avoid fried or fatty foods, dairy, alcohol and spicy foods until your symptoms go away.  FOLLOW UP with your doctor or this facility as instructed, or if your pain does not begin to improve in the next 24 hours.   GET PROMPT MEDICAL ATTENTION if any of the following occur:  Pain gets worse or moves to the right lower abdomen  New or worsening vomiting or diarrhea  Swelling of the abdomen  Unable to pass stool for more than three days  New fever over 101  F (38.3 C), or rising fever  Blood in vomit or bowel movements (dark red or black color)  Jaundice (yellow color of eyes and skin)  Weakness, dizziness or fainting  Chest, arm, back, neck or jaw pain  Unexpected vaginal bleeding or missed period    [id number] [employer], [address] [place] [address], [place] [address]. All rights reserved. This information is not intended as a substitute for professional medical care. Always follow your healthcare professional's instructions.

## 2021-10-18 NOTE — PROGRESS NOTES
"    Assessment & Plan     RUQ abdominal pain  Advised gall Bladder Ultrasound  No fatty foods  If symptoms worse go to ER  Follow up PMD 2 days -sooner if worse  - US Abdomen Limited; Future  - CBC with platelets and differential; Future  - Potassium; Future  - Potassium  - CBC with platelets and differential    Nausea    - ondansetron (ZOFRAN-ODT) 4 MG ODT tab; Take 2 tablets (8 mg) by mouth every 8 hours as needed for nausea    Diarrhea, unspecified type    - Symptomatic COVID-19 Virus (Coronavirus) by PCR; Future  - Symptomatic COVID-19 Virus (Coronavirus) by PCR Nose    Morbid obesity (H)  Low dayanara diet               BMI: Return in about 2 days (around 10/20/2021) for recheck/Please schedule appointment.    Charo Valles MD  [facility] BALAJI Bernardo is a 51 year old who presents for the following health issues {    HPI     ED/UC Followup:    Facility:   [facility]  Date of visit: 10/17/21  Reason for visit: Abdominal pain  Current Status: still have pain, diarrhea/  and nausea     Pt has some nausea  Has GERD-used to take PPI  Went through menopause 5 years ago  has had mild Diarrhea Bright yellow  Pain is down to 2 but can go upto 5/6  Gets it in RUQ and radiates to back Right side   Has Family history of gall stones  ER notes reviewed     Review of Systems   CONSTITUTIONAL: NEGATIVE for fever, chills, change in weight  ENT/MOUTH: NEGATIVE for ear, mouth and throat problems  RESP: NEGATIVE for significant cough or SOB  CV: NEGATIVE for chest pain, palpitations or peripheral edema  GI: as above  : none  Rest of the ROS is Negative except see above and Problem list [stable]    ROS otherwise negative      Objective    /88   Pulse 79   Temp 98.8  F (37.1  C) (Oral)   Resp 18   Ht 1.651 m (5' 5\")   Wt 107.5 kg (237 lb)   LMP 05/31/2016 (Approximate)   SpO2 95%   BMI 39.44 kg/m    Body mass index is 39.44 kg/m .  Physical Exam   GENERAL: healthy, alert and no " distress  NECK: no adenopathy, no asymmetry, masses, or scars and thyroid normal to palpation  RESP: lungs clear to auscultation - no rales, rhonchi or wheezes  CV: regular rate and rhythm, normal S1 S2, no S3 or S4, no murmur, click or rub, no peripheral edema and peripheral pulses strong  ABDOMEN: tenderness RUQ, bowel sounds normal and no palpable or pulsatile masses  No guarding or rebound  MS: no gross musculoskeletal defects noted, no edema    Admission on 10/17/2021, Discharged on 10/17/2021   Component Date Value Ref Range Status     Sodium 10/17/2021 139  133 - 144 mmol/L Final     Potassium 10/17/2021 2.9* 3.4 - 5.3 mmol/L Final     Chloride 10/17/2021 106  94 - 109 mmol/L Final     Carbon Dioxide (CO2) 10/17/2021 27  20 - 32 mmol/L Final     Anion Gap 10/17/2021 6  3 - 14 mmol/L Final     Urea Nitrogen 10/17/2021 10  7 - 30 mg/dL Final     Creatinine 10/17/2021 0.77  0.52 - 1.04 mg/dL Final     Calcium 10/17/2021 9.7  8.5 - 10.1 mg/dL Final     Glucose 10/17/2021 111* 70 - 99 mg/dL Final     Alkaline Phosphatase 10/17/2021 129  40 - 150 U/L Final     AST 10/17/2021 16  0 - 45 U/L Final     ALT 10/17/2021 32  0 - 50 U/L Final     Protein Total 10/17/2021 8.2  6.8 - 8.8 g/dL Final     Albumin 10/17/2021 4.2  3.4 - 5.0 g/dL Final     Bilirubin Total 10/17/2021 0.6  0.2 - 1.3 mg/dL Final     GFR Estimate 10/17/2021 90  >60 mL/min/1.73m2 Final    As of July 11, 2021, eGFR is calculated by the CKD-EPI creatinine equation, without race adjustment. eGFR can be influenced by muscle mass, exercise, and diet. The reported eGFR is an estimation only and is only applicable if the renal function is stable.     Lipase 10/17/2021 95  73 - 393 U/L Final     WBC Count 10/17/2021 10.3  4.0 - 11.0 10e3/uL Final     RBC Count 10/17/2021 5.26* 3.80 - 5.20 10e6/uL Final     Hemoglobin 10/17/2021 15.2  11.7 - 15.7 g/dL Final     Hematocrit 10/17/2021 45.2  35.0 - 47.0 % Final     MCV 10/17/2021 86  78 - 100 fL Final     MCH  10/17/2021 28.9  26.5 - 33.0 pg Final     MCHC 10/17/2021 33.6  31.5 - 36.5 g/dL Final     RDW 10/17/2021 12.3  10.0 - 15.0 % Final     Platelet Count 10/17/2021 393  150 - 450 10e3/uL Final     % Neutrophils 10/17/2021 68  % Final     % Lymphocytes 10/17/2021 26  % Final     % Monocytes 10/17/2021 5  % Final     % Eosinophils 10/17/2021 0  % Final     % Basophils 10/17/2021 1  % Final     % Immature Granulocytes 10/17/2021 0  % Final     NRBCs per 100 WBC 10/17/2021 0  <1 /100 Final     Absolute Neutrophils 10/17/2021 7.0  1.6 - 8.3 10e3/uL Final     Absolute Lymphocytes 10/17/2021 2.7  0.8 - 5.3 10e3/uL Final     Absolute Monocytes 10/17/2021 0.5  0.0 - 1.3 10e3/uL Final     Absolute Eosinophils 10/17/2021 0.0  0.0 - 0.7 10e3/uL Final     Absolute Basophils 10/17/2021 0.1  0.0 - 0.2 10e3/uL Final     Absolute Immature Granulocytes 10/17/2021 0.0  <=0.0 10e3/uL Final     Absolute NRBCs 10/17/2021 0.0  10e3/uL Final     Hold Specimen 10/17/2021 [facility]   Final     Color Urine 10/17/2021 Straw  Colorless, Straw, Light Yellow, Yellow Final     Appearance Urine 10/17/2021 Clear  Clear Final     Glucose Urine 10/17/2021 Negative  Negative mg/dL Final     Bilirubin Urine 10/17/2021 Negative  Negative Final     Ketones Urine 10/17/2021 Negative  Negative mg/dL Final     Specific Gravity Urine 10/17/2021 1.039* 1.003 - 1.035 Final     Blood Urine 10/17/2021 Negative  Negative Final     pH Urine 10/17/2021 7.0  5.0 - 7.0 Final     Protein Albumin Urine 10/17/2021 Negative  Negative mg/dL Final     Urobilinogen Urine 10/17/2021 Normal  Normal, 2.0 mg/dL Final     Nitrite Urine 10/17/2021 Negative  Negative Final     Leukocyte Esterase Urine 10/17/2021 Moderate* Negative Final     Mucus Urine 10/17/2021 Present* None Seen /LPF Final     RBC Urine 10/17/2021 1  <=2 /HPF Final     WBC Urine 10/17/2021 14* <=5 /HPF Final     Squamous Epithelials Urine 10/17/2021 <1  <=1 /HPF Final     Transitional Epithelials Urine 10/17/2021 <1   <=1 /HPF Final     Culture 10/17/2021 Culture in progress   Preliminary     Results for orders placed or performed in visit on 10/18/21   CBC with platelets and differential     Status: Abnormal   Result Value Ref Range    WBC Count 11.3 (H) 4.0 - 11.0 10e3/uL    RBC Count 5.14 3.80 - 5.20 10e6/uL    Hemoglobin 14.9 11.7 - 15.7 g/dL    Hematocrit 44.9 35.0 - 47.0 %    MCV 87 78 - 100 fL    MCH 29.0 26.5 - 33.0 pg    MCHC 33.2 31.5 - 36.5 g/dL    RDW 13.1 10.0 - 15.0 %    Platelet Count 367 150 - 450 10e3/uL    % Neutrophils 73 %    % Lymphocytes 21 %    % Monocytes 5 %    % Eosinophils 0 %    % Basophils 0 %    Absolute Neutrophils 8.2 1.6 - 8.3 10e3/uL    Absolute Lymphocytes 2.4 0.8 - 5.3 10e3/uL    Absolute Monocytes 0.6 0.0 - 1.3 10e3/uL    Absolute Eosinophils 0.0 0.0 - 0.7 10e3/uL    Absolute Basophils 0.0 0.0 - 0.2 10e3/uL   CBC with platelets and differential     Status: Abnormal    Narrative    The following orders were created for panel order CBC with platelets and differential.  Procedure                               Abnormality         Status                     ---------                               -----------         ------                     CBC with platelets and d...[[id number]]  Abnormal            Final result                 Please view results for these tests on the individual orders.

## 2021-10-18 NOTE — ED PROVIDER NOTES
[place] EMERGENCY DEPARTMENT ([facility])  10/17/21  History     Chief Complaint   Patient presents with     Abdominal Pain     The history is provided by the patient and medical records.     Janelle Byrd is a 51 year old female with a past medical history significant for GERD and kidney stones who presents to the Emergency Department for evaluation of right flank pain radiating into her right sided abdomen (Per triage note, patient's flank pain was located on the left,. however, upon evaluation it was confirmed that her pain is located on her right flank). She endorses  Right flank pain and nausea over the past 2 days.  Pain is mild, intermittent, achy, radiates to mid and lower abdomen.  She sleeps on her right side and will wake up around 5:30 AM due to pain. Her pain improved throughout the day and it also improve when she ambulates more often.  She endorses nausea, chills, and abdominal discomfort.  She also reports 2-3 days of bright yellow diarrhea.  She denies fevers, shortness of breath, or chest pain. Patient has previously had two C-sections.  She reports similar symptoms about 5 years ago without abdominal pain. Her workup at that time was unremarkable and her pain resolved without intervention. She endorses left shoulder pain (triage note states right shoulder pain, however, pain was confirmed as left shoulder pain) which she does not feel is related to her flank pain. She notes that she has a bad left shoulder and has previously had surgery to decompress this shoulder and remove calcium from it.  At this time she has no pain in her left shoulder.  She has not been taking medication for pain relief.  No fevers    Past Medical History:   Diagnosis Date     Arthritis     Hands, feet, and knees     Benign positional vertigo 2/2017     Complex tear of medial meniscus of right knee as current injury, initial encounter 10/25/2016     Low back pain 12/5/2014     Nephrolithiasis       S/P meniscectomy 2016     Skin cancer 2001    Basal cell carcinoma on nose     Sleep apnea 2018     Tinnitus 1979     TRANS HYPERTEN-ANTEPART 5/10/2006     Umbilical discharge 2014       Past Surgical History:   Procedure Laterality Date     ARTHROSCOPY SHOULDER  10/2004    LT/to remove calcium deposits     ARTHROSCOPY SHOULDER DECOMPRESSION Left 2014    Procedure: ARTHROSCOPY SHOULDER DECOMPRESSION;  Surgeon: Julien Maciel MD;  Location: [facility] OR     BIOPSY  2001    skin cancer from nose      SECTION  2006    , Low Transverse      SECTION  2008     COLONOSCOPY  2016    Normal     DILATION AND CURETTAGE, OPERATIVE HYSTEROSCOPY WITH MORCELLATOR, COMBINED N/A 2016    Procedure: COMBINED DILATION AND CURETTAGE, OPERATIVE HYSTEROSCOPY WITH MORCELLATOR;  Surgeon: Idalia Mcarthur MD;  Location: [facility] OR       Family History   Problem Relation Age of Onset     Lipids Mother      Arthritis Mother      Psychotic Disorder Mother      Respiratory Mother         copd     Circulatory Mother         BLOOD CLOTS     Genitourinary Problems Mother      Heart Failure Mother      Depression Mother      Mental Illness Mother         Bi-polar     Obesity Mother      Lipids Father      Alcohol/Drug Father      Arthritis Father      C.A.D. Father      Cancer - colorectal Father      Hypertension Father      Cerebrovascular Disease Father         10/2018     Colon Cancer Father      Substance Abuse Father      Anesthesia Reaction Father      Lung Cancer Father      Heart Disease Maternal Grandmother      Breast Cancer Maternal Grandmother      Cerebrovascular Disease Maternal Grandmother      Alcohol/Drug Maternal Grandfather      Cerebrovascular Disease Maternal Grandfather      Alcohol/Drug Paternal Grandmother      Hypertension Paternal Grandmother      C.A.D. Paternal Grandfather      Gastrointestinal Disease Paternal Grandfather         liver diease      Alcohol/Drug Paternal Grandfather      Cerebrovascular Disease Paternal Grandfather      Circulatory Sister         BLOOD CLOTS     Diabetes Brother      Diabetes Sister        Social History     Tobacco Use     Smoking status: Never Smoker     Smokeless tobacco: Never Used   Substance Use Topics     Alcohol use: Yes     Comment: 1-2 times per month       No current facility-administered medications for this encounter.     Current Outpatient Medications   Medication     acetaminophen (TYLENOL) 325 MG tablet     hydrochlorothiazide (HYDRODIURIL) 25 MG tablet     losartan (COZAAR) 50 MG tablet     methylPREDNISolone (MEDROL DOSEPAK) 4 MG tablet therapy pack     metroNIDAZOLE (METROGEL) 0.75 % gel     MULTI-VITAMIN OR TABS     nystatin (MYCOSTATIN) 948116 UNIT/GM external powder        Allergies   Allergen Reactions     Depo-Medrol [Methylprednisolone] Other (See Comments)     Oral thrush after receiving an intra-articular injection, reports that she has family members that have also had this symptom following intra-articular injections.     Dust Mites      Sneezing     Mold Cough     Tape [Adhesive Tape] Rash       I have reviewed the Medications, Allergies, Past Medical and Surgical History, and Social History in the Epic system.    Review of Systems   Constitutional: Negative for chills and fever.   HENT: Negative for congestion.    Eyes: Negative for redness.   Respiratory: Negative for shortness of breath.    Cardiovascular: Negative for chest pain.   Gastrointestinal: Positive for abdominal pain, nausea and vomiting.   Endocrine: Negative for polydipsia and polyuria.   Genitourinary: Positive for flank pain ( right). Negative for difficulty urinating and pelvic pain.   Musculoskeletal: Positive for arthralgias ( occasional, left shoulder). Negative for neck stiffness.   Skin: Negative for color change.   Allergic/Immunologic: Negative for immunocompromised state.   Neurological: Negative for headaches.  "  Hematological: Negative for adenopathy. Does not bruise/bleed easily.   Psychiatric/Behavioral: Negative for confusion.   All other systems reviewed and are negative.    A complete review of systems was performed with pertinent positives and negatives noted in the HPI, and all other systems negative.    Physical Exam   BP: (!) 163/112  Pulse: 88  Temp: 98.4  F (36.9  C)  Resp: 16  Height: 165.1 cm (5' 5\")  Weight: 108 kg (238 lb)  SpO2: 95 %      Physical Exam  Vitals and nursing note reviewed.   Constitutional:       General: She is not in acute distress.     Appearance: Normal appearance. She is not ill-appearing, toxic-appearing or diaphoretic.   HENT:      Head: Normocephalic and atraumatic.      Mouth/Throat:      Pharynx: No oropharyngeal exudate.   Eyes:      General: No scleral icterus.     Extraocular Movements: Extraocular movements intact.      Conjunctiva/sclera: Conjunctivae normal.   Cardiovascular:      Rate and Rhythm: Normal rate.      Pulses: Normal pulses.      Heart sounds: Normal heart sounds.   Pulmonary:      Effort: Pulmonary effort is normal. No respiratory distress.      Breath sounds: Normal breath sounds. No wheezing.   Abdominal:      General: There is no distension.      Palpations: Abdomen is soft.      Tenderness: There is abdominal tenderness ( right, mid-abdomen). There is no right CVA tenderness, left CVA tenderness, guarding or rebound.   Musculoskeletal:         General: No tenderness.      Cervical back: Normal range of motion and neck supple.   Skin:     General: Skin is warm.      Findings: No rash.   Neurological:      Mental Status: She is alert.   Psychiatric:         Mood and Affect: Mood normal.         Behavior: Behavior normal.         Thought Content: Thought content normal.         Judgment: Judgment normal.         ED Course     At 7:46 PM the patient was seen and examined by Tammy Starks MD in Room ED22.        Procedures                   Results for orders " placed or performed during the hospital encounter of 10/17/21 (from the past 24 hour(s))   Comprehensive metabolic panel   Result Value Ref Range    Sodium 139 133 - 144 mmol/L    Potassium 2.9 (L) 3.4 - 5.3 mmol/L    Chloride 106 94 - 109 mmol/L    Carbon Dioxide (CO2) 27 20 - 32 mmol/L    Anion Gap 6 3 - 14 mmol/L    Urea Nitrogen 10 7 - 30 mg/dL    Creatinine 0.77 0.52 - 1.04 mg/dL    Calcium 9.7 8.5 - 10.1 mg/dL    Glucose 111 (H) 70 - 99 mg/dL    Alkaline Phosphatase 129 40 - 150 U/L    AST 16 0 - 45 U/L    ALT 32 0 - 50 U/L    Protein Total 8.2 6.8 - 8.8 g/dL    Albumin 4.2 3.4 - 5.0 g/dL    Bilirubin Total 0.6 0.2 - 1.3 mg/dL    GFR Estimate 90 >60 mL/min/1.73m2   CBC with platelets differential    Narrative    The following orders were created for panel order CBC with platelets differential.  Procedure                               Abnormality         Status                     ---------                               -----------         ------                     CBC with platelets and d...[[id number]]  Abnormal            Final result                 Please view results for these tests on the individual orders.   Lipase   Result Value Ref Range    Lipase 95 73 - 393 U/L   CBC with platelets and differential   Result Value Ref Range    WBC Count 10.3 4.0 - 11.0 10e3/uL    RBC Count 5.26 (H) 3.80 - 5.20 10e6/uL    Hemoglobin 15.2 11.7 - 15.7 g/dL    Hematocrit 45.2 35.0 - 47.0 %    MCV 86 78 - 100 fL    MCH 28.9 26.5 - 33.0 pg    MCHC 33.6 31.5 - 36.5 g/dL    RDW 12.3 10.0 - 15.0 %    Platelet Count 393 150 - 450 10e3/uL    % Neutrophils 68 %    % Lymphocytes 26 %    % Monocytes 5 %    % Eosinophils 0 %    % Basophils 1 %    % Immature Granulocytes 0 %    NRBCs per 100 WBC 0 <1 /100    Absolute Neutrophils 7.0 1.6 - 8.3 10e3/uL    Absolute Lymphocytes 2.7 0.8 - 5.3 10e3/uL    Absolute Monocytes 0.5 0.0 - 1.3 10e3/uL    Absolute Eosinophils 0.0 0.0 - 0.7 10e3/uL    Absolute Basophils 0.1 0.0 - 0.2 10e3/uL     Absolute Immature Granulocytes 0.0 <=0.0 10e3/uL    Absolute NRBCs 0.0 10e3/uL   Extra Tube    Narrative    The following orders were created for panel order Extra Tube.  Procedure                               Abnormality         Status                     ---------                               -----------         ------                     Extra Green Top (Lithium...[[id number]]                      Final result                 Please view results for these tests on the individual orders.   Extra Green Top (Lithium Heparin) Tube   Result Value Ref Range    Hold Specimen [facility]    CT Abdomen Pelvis w Contrast    Narrative    EXAMINATION: CT ABDOMEN PELVIS W CONTRAST, 10/17/2021 8:50 PM    TECHNIQUE:  Helical CT images from the lung bases through the  symphysis pubis were obtained  with IV contrast. Contrast dose: 135 mL  Isovue 370    COMPARISON: CT 6/17/2014    HISTORY: Abdominal abscess/infection suspected    FINDINGS:  Focal fatty infiltration along the falciform ligament. Hypoattenuating  focus within the left lobe of the liver, too small to characterize  though favored to represent simple hepatic cysts. The liver is  otherwise within normal limits. The gallbladder is within normal  limits. Subcentimeter hypoattenuating exophytic focus involving the  right kidney, too small to characterize though favored to represent  simple renal cysts. The kidneys are otherwise within normal limits. No  hydronephrosis. No nephrolithiasis. The spleen and adrenal glands are  within normal limits. Within the distal body of the pancreas is a  hypoechoic attenuating cystic lesion measuring 5 mm (series 7, image  108).    There are no dilated loops of small or large bowel. Colonic  diverticulosis without evidence of acute diverticulitis. Appendix is  within normal limits. Uterus is within normal limits. No adnexal mass.  Urinary bladder is distended and grossly unremarkable.    No free fluid or free air within the abdomen or pelvis.  No abdominal  or pelvic lymphadenopathy. Major vasculature of the abdomen and pelvis  is patent. No intrarenal abdominal aortic aneurysm.    Bones and soft tissue: No suspicious osseous lesion. Soft tissue is  within normal limits.    Lung bases: No pleural effusion. No pneumothorax. Minimal bibasilar  atelectasis. Heart size is normal. No pericardial effusion.      Impression    IMPRESSION:   1. No acute intra-abdominal pathology.  2. 5 mm cystic lesion within the distal body of the pancreas. See  below for follow-up recommendations.  3. Colonic diverticulosis without evidence of acute diverticulitis.    [facility] recommendations for asymptomatic pancreatic  cysts, modified from international consensus guidelines*   Size of largest cyst:   Less than 1 cm: Follow-up imaging in 6 months to 1 year, then lengthen  interval to 2-3 years if no change.  1-2 cm: Follow-up imaging at 6 months, then yearly for 2 years, then  lengthen interval if no change.   The optimal initial study or first follow-up exam is MRI/MRCP  performed with intravenous gadolinium contrast to allow full cyst  characterization. Once characterized, contrast is optional on  follow-up MRIs. If MRI is contraindicated, CT with contrast is  recommended.   GI consultation for possible endoscopic ultrasound is recommended for  cysts with the following features: Size > 2 cm, >20% growth on  followup, wall thickening or enhancement, mural nodule, duct > 5 mm,  or abrupt change in duct caliber with distal atrophy. GI or surgical  consultation is also recommended for symptomatic cysts.   *Reference: International Consensus Guidelines for Management of IPMN  and MCN of the pancreas. Pancreatology: 12:(2012); 183-197.    I have personally reviewed the examination and initial interpretation  and I agree with the findings.    RICCI GUNTER MD         SYSTEM ID:  [id number]   UA with Microscopic reflex to Culture    Specimen: Urine, Clean Catch    Result Value Ref Range    Color Urine Straw Colorless, Straw, Light Yellow, Yellow    Appearance Urine Clear Clear    Glucose Urine Negative Negative mg/dL    Bilirubin Urine Negative Negative    Ketones Urine Negative Negative mg/dL    Specific Gravity Urine 1.039 (H) 1.003 - 1.035    Blood Urine Negative Negative    pH Urine 7.0 5.0 - 7.0    Protein Albumin Urine Negative Negative mg/dL    Urobilinogen Urine Normal Normal, 2.0 mg/dL    Nitrite Urine Negative Negative    Leukocyte Esterase Urine Moderate (A) Negative    Mucus Urine Present (A) None Seen /LPF    RBC Urine 1 <=2 /HPF    WBC Urine 14 (H) <=5 /HPF    Squamous Epithelials Urine <1 <=1 /HPF    Transitional Epithelials Urine <1 <=1 /HPF    Narrative    Urine Culture ordered based on laboratory criteria     Medications   iopamidol (ISOVUE-370) solution 135 mL (135 mLs Intravenous Given 10/17/21 2038)   sodium chloride (PF) 0.9% PF flush 84 mL (84 mLs Intravenous Given 10/17/21 2039)   potassium chloride ER (KLOR-CON M) CR tablet 50 mEq (50 mEq Oral Given 10/17/21 2153)             Assessments & Plan (with Medical Decision Making)   51-year-old woman presenting with right flank and abdominal pains for several days with intermittent nausea. Differential diagnosis: Kidney stone, pyelonephritis, SBO, volvulus, unlikely acute appendicitis.    After thorough history and physical examination patient appears to be in no acute distress. Laboratory studies were obtained which showed no leukocytosis, WBC is normal at 10,300. There is no anemia, hemoglobin is normal at 15.2. LFTs and lipase are normal. Creatinine is normal at 0.77. Patient is hypokalemic with potassium of 2.9. This will be repleted with oral potassium bicarbonate solution. I will obtain CT abdomen/pelvis for further diagnostic evaluation along with urinalysis. Patient agrees with the plan.    This part of the medical record was transcribed by Sherrill John, Medical Scribe, from a dictation done  by Tammy Starks MD.     Patient's urinalysis returned with slightly elevated WBCs, however, patient has no urinary symptoms and I do not suspect this is acute cystitis that needs to be treated with antibiotics.  I reviewed her CT abdomen/pelvis and I read the radiology report there is no evidence of hydronephrosis, kidney stone, or any other intra-abdominal infection/obstruction, or etiology of patient's pain.  Repeat abdominal exam shows soft, nontender, nondistended abdomen.  At this time she is stable for discharge with outpatient primary care follow-up  for further evaluation.  She also agrees to return to the emergency department if her symptoms worsen.     This part of the medical record was transcribed by Mary Abel Medical Scribe, from a dictation done by Tammy Starks MD.     I have reviewed the nursing notes.    I have reviewed the findings, diagnosis, plan and need for follow up with the patient.    Discharge Medication List as of 10/17/2021 10:47 PM          Final diagnoses:   Abdominal pain, generalized   Right flank pain       ISherrill am serving as a trained medical scribe to document services personally performed by Tammy Starks MD, based on the provider's statements to me.      ITereza Nikola, MD, was physically present and have reviewed and verified the accuracy of this note documented by Sherrill John.     Tammy Starks MD  10/17/2021   [facility] EMERGENCY DEPARTMENT     Tammy Starks MD  10/17/21 [id number]

## 2021-10-19 ENCOUNTER — ANCILLARY PROCEDURE (OUTPATIENT)
Dept: ULTRASOUND IMAGING | Facility: CLINIC | Age: 51
End: 2021-10-19
Attending: FAMILY MEDICINE
Payer: COMMERCIAL

## 2021-10-19 DIAGNOSIS — R10.11 RUQ ABDOMINAL PAIN: ICD-10-CM

## 2021-10-19 LAB — SARS-COV-2 RNA RESP QL NAA+PROBE: NEGATIVE

## 2021-10-19 PROCEDURE — 76705 ECHO EXAM OF ABDOMEN: CPT | Performed by: RADIOLOGY

## 2021-10-25 ENCOUNTER — OFFICE VISIT (OUTPATIENT)
Dept: FAMILY MEDICINE | Facility: CLINIC | Age: 51
End: 2021-10-25
Payer: COMMERCIAL

## 2021-10-25 VITALS
DIASTOLIC BLOOD PRESSURE: 82 MMHG | OXYGEN SATURATION: 98 % | WEIGHT: 239 LBS | TEMPERATURE: 98.3 F | HEIGHT: 65 IN | BODY MASS INDEX: 39.82 KG/M2 | SYSTOLIC BLOOD PRESSURE: 134 MMHG | HEART RATE: 75 BPM | RESPIRATION RATE: 12 BRPM

## 2021-10-25 DIAGNOSIS — I10 HYPERTENSION GOAL BP (BLOOD PRESSURE) < 140/90: Primary | ICD-10-CM

## 2021-10-25 DIAGNOSIS — Z12.11 SCREEN FOR COLON CANCER: ICD-10-CM

## 2021-10-25 DIAGNOSIS — H93.13 TINNITUS, BILATERAL: ICD-10-CM

## 2021-10-25 DIAGNOSIS — K86.2 PANCREAS CYST: ICD-10-CM

## 2021-10-25 DIAGNOSIS — E66.01 MORBID OBESITY (H): ICD-10-CM

## 2021-10-25 PROCEDURE — 99213 OFFICE O/P EST LOW 20 MIN: CPT | Mod: 25 | Performed by: PHYSICIAN ASSISTANT

## 2021-10-25 PROCEDURE — 90471 IMMUNIZATION ADMIN: CPT | Performed by: PHYSICIAN ASSISTANT

## 2021-10-25 PROCEDURE — 90682 RIV4 VACC RECOMBINANT DNA IM: CPT | Performed by: PHYSICIAN ASSISTANT

## 2021-10-25 RX ORDER — HYDROCHLOROTHIAZIDE 25 MG/1
25 TABLET ORAL DAILY
Qty: 90 TABLET | Refills: 1 | Status: SHIPPED | OUTPATIENT
Start: 2021-10-25 | End: 2022-01-14

## 2021-10-25 RX ORDER — LOSARTAN POTASSIUM 50 MG/1
50 TABLET ORAL DAILY
Qty: 30 TABLET | Refills: 0 | Status: CANCELLED | OUTPATIENT
Start: 2021-10-25

## 2021-10-25 RX ORDER — LOSARTAN POTASSIUM 100 MG/1
100 TABLET ORAL DAILY
Qty: 90 TABLET | Refills: 1 | Status: SHIPPED | OUTPATIENT
Start: 2021-10-25 | End: 2022-01-14

## 2021-10-25 ASSESSMENT — MIFFLIN-ST. JEOR: SCORE: 1699.98

## 2021-10-25 ASSESSMENT — PAIN SCALES - GENERAL: PAINLEVEL: MILD PAIN (2)

## 2021-10-25 NOTE — PROGRESS NOTES
Assessment & Plan     Hypertension goal BP (blood pressure) < 140/90  BP stable  Refills given.  - hydrochlorothiazide (HYDRODIURIL) 25 MG tablet; Take 1 tablet (25 mg) by mouth daily  - losartan (COZAAR) 100 MG tablet; Take 1 tablet (100 mg) by mouth daily    Tinnitus, bilateral  Chronic. Significant. Has had ENT evaluations. Reviewed this and concerns and methods of self management, stress management, etc.    Screen for colon cancer  Reminder discussed on next evaluation due date and she is considering     Morbid obesity (H)  Briefly discussed     Pancreas cyst  Found on CT abdomen incidentally. Will get a recheck in 6-12 months per guidelines.       Return in about 6 months (around 4/25/2022) for Physical Exam.    RUDDY OLVIER [facility]    Aris Bernardo is a 51 year old who presents for the following health issues     HPI     Hypertension Follow-up      Do you check your blood pressure regularly outside of the clinic? Yes     Are you following a low salt diet? Yes    Are your blood pressures ever more than 140 on the top number (systolic) OR more   than 90 on the bottom number (diastolic), for example 140/90? Yes      How many servings of fruits and vegetables do you eat daily?  2-3    On average, how many sweetened beverages do you drink each day (Examples: soda, juice, sweet tea, etc.  Do NOT count diet or artificially sweetened beverages)?   1    How many days per week do you exercise enough to make your heart beat faster? 3 or less    How many minutes a day do you exercise enough to make your heart beat faster? 20 - 29  How many days per week do you miss taking your medication? 1    What makes it hard for you to take your medications?  remembering to take        Patient Active Problem List   Diagnosis     Primary osteoarthritis of both knees     GERD (gastroesophageal reflux disease)     SHONDA (obstructive sleep apnea)     Morbid obesity (H)     Tinnitus, unspecified  "laterality     Pancreas cyst     Hypertension goal BP (blood pressure) < 140/90      Current Outpatient Medications   Medication     hydrochlorothiazide (HYDRODIURIL) 25 MG tablet     losartan (COZAAR) 100 MG tablet     acetaminophen (TYLENOL) 325 MG tablet     losartan (COZAAR) 50 MG tablet     metroNIDAZOLE (METROGEL) 0.75 % gel     MULTI-VITAMIN OR TABS     nystatin (MYCOSTATIN) 069234 UNIT/GM external powder     ondansetron (ZOFRAN-ODT) 4 MG ODT tab     No current facility-administered medications for this visit.        Review of Systems   Constitutional, HEENT, cardiovascular, pulmonary, GI, , musculoskeletal, neuro, skin, endocrine and psych systems are negative, except as otherwise noted.      Objective    /82 (BP Location: Right arm, Patient Position: Chair, Cuff Size: Adult Large)   Pulse 75   Temp 98.3  F (36.8  C) (Tympanic)   Resp 12   Ht 1.651 m (5' 5\")   Wt 108.4 kg (239 lb)   LMP 05/31/2016 (LMP Unknown)   SpO2 98%   Breastfeeding No   BMI 39.77 kg/m    Body mass index is 39.77 kg/m .  Physical Exam   GENERAL: healthy, alert and no distress  NECK: no adenopathy, no asymmetry, masses, or scars and thyroid normal to palpation  RESP: lungs clear to auscultation - no rales, rhonchi or wheezes  CV: regular rate and rhythm, normal S1 S2, no S3 or S4, no murmur, click or rub, no peripheral edema and peripheral pulses strong  MS: no gross musculoskeletal defects noted, no edema          "

## 2021-11-18 ENCOUNTER — TRANSFERRED RECORDS (OUTPATIENT)
Dept: HEALTH INFORMATION MANAGEMENT | Facility: CLINIC | Age: 51
End: 2021-11-18
Payer: COMMERCIAL

## 2021-11-22 ENCOUNTER — TRANSFERRED RECORDS (OUTPATIENT)
Dept: HEALTH INFORMATION MANAGEMENT | Facility: CLINIC | Age: 51
End: 2021-11-22
Payer: COMMERCIAL

## 2021-11-24 ENCOUNTER — OFFICE VISIT (OUTPATIENT)
Dept: OBGYN | Facility: CLINIC | Age: 51
End: 2021-11-24
Payer: COMMERCIAL

## 2021-11-24 VITALS
DIASTOLIC BLOOD PRESSURE: 87 MMHG | BODY MASS INDEX: 39.13 KG/M2 | SYSTOLIC BLOOD PRESSURE: 126 MMHG | TEMPERATURE: 95.3 F | HEIGHT: 66 IN | WEIGHT: 243.5 LBS

## 2021-11-24 DIAGNOSIS — Z12.4 CERVICAL CANCER SCREENING: ICD-10-CM

## 2021-11-24 DIAGNOSIS — Z01.419 ENCOUNTER FOR GYNECOLOGICAL EXAMINATION WITHOUT ABNORMAL FINDING: Primary | ICD-10-CM

## 2021-11-24 PROCEDURE — 99396 PREV VISIT EST AGE 40-64: CPT | Performed by: OBSTETRICS & GYNECOLOGY

## 2021-11-24 PROCEDURE — G0145 SCR C/V CYTO,THINLAYER,RESCR: HCPCS | Performed by: OBSTETRICS & GYNECOLOGY

## 2021-11-24 PROCEDURE — 87624 HPV HI-RISK TYP POOLED RSLT: CPT | Performed by: OBSTETRICS & GYNECOLOGY

## 2021-11-24 ASSESSMENT — MIFFLIN-ST. JEOR: SCORE: 1736.26

## 2021-11-24 NOTE — Clinical Note
Please abstract the following data from this visit with this patient into the appropriate field in Epic:    Tests that can be patient reported without a hard copy:    Colonoscopy done on this date: 10/2021 (approximately), by this group: [facility], results were normal.

## 2021-11-24 NOTE — PROGRESS NOTES
Janelle is a 51 year old  who presents for annual exam.   Postmenopausal since 2016.  She is having no menopausal symptoms. No vaginal bleeding noted.     Besides routine health maintenance, she has no other health concerns today.  Her oldest is looking at college for next year, currently likes [employer] the best.  Her dept at  [employer] closed, so she is looking for new job currently.  Please abstract the following data from this visit with this patient into the appropriate field in Epic:    Tests that can be patient reported without a hard copy:    Colonoscopy done on this date: 10/2021 (approximately), by this group: [facility], results were normal.     GYNECOLOGIC HISTORY:  Menarche: 13   Age at first intercourse: 27 Number of lifetime partners: 1  She is sexually active with male partner(s)   History sexually transmitted infections:No STD history  STI testing offered?  Declined  Estrogen replacement therapy: No  CARO exposure: No    History of abnormal Pap smear: NO - age 30-65 PAP every 5 years with negative HPV co-testing recommended  Family history of breast CA: Yes (Please explain): gma-mother side  Family history of uterine/ovarian CA: No  Family history of colon CA: Yes (Please explain): father     HEALTH MAINTENANCE:  Cholesterol: (No components found for: CHOL2 ) History of abnormal lipids: No  Mammo: 2021 . History of abnormal Mammo: No  Regular Self Breast Exams: Yes  Colonoscopy: 2016 History of abnormal Colonoscopy: No  Dexa: Never had one History of abnormal Dexa: No  Calcium/Vitamin D intake: source:  dairy, dietary supplement(s) Adequate? Yes  TSH: (No components found for: TSH1 )  Pap; (  Lab Results   Component Value Date    PAP NIL 2015    PAP NIL 2012    PAP NIL 2011    )    HISTORY:  OB History    Para Term  AB Living   3 3 2 1 0 3   SAB IAB Ectopic Multiple Live Births   0 0 0 0 3      # Outcome Date GA Lbr Chace/2nd Weight Sex Delivery Anes  PTL Lv   3  08 35w4d  2.892 kg (6 lb 6 oz) M CS-Unspec   RICHARD      Birth Comments: repeat CS      Name: Antony      Apgar1: 8  Apgar5: 8   2 Term 06 39w0d  4.536 kg (10 lb) M CS   RICHARD      Birth Comments: FTP      Name: Keon   1 Term 04 38w0d  3.799 kg (8 lb 6 oz) M    RICHARD      Name: Rafi     Past Medical History:   Diagnosis Date     Arthritis     Hands, feet, and knees     Benign positional vertigo 2017     Complex tear of medial meniscus of right knee as current injury, initial encounter 10/25/2016     Low back pain 2014     Nephrolithiasis 2000     S/P meniscectomy 2016     Skin cancer 2001    Basal cell carcinoma on nose     Sleep apnea 2018     Tinnitus 1979     TRANS HYPERTEN-ANTEPART 5/10/2006     Umbilical discharge 2014     Past Surgical History:   Procedure Laterality Date     ARTHROSCOPY SHOULDER  10/2004    LT/to remove calcium deposits     ARTHROSCOPY SHOULDER DECOMPRESSION Left 2014    Procedure: ARTHROSCOPY SHOULDER DECOMPRESSION;  Surgeon: Julien Maciel MD;  Location: [facility]     BIOPSY  2001    skin cancer from nose      SECTION  2006    , Low Transverse      SECTION  2008     COLONOSCOPY  2016    Normal     DILATION AND CURETTAGE, OPERATIVE HYSTEROSCOPY WITH MORCELLATOR, COMBINED N/A 2016    Procedure: COMBINED DILATION AND CURETTAGE, OPERATIVE HYSTEROSCOPY WITH MORCELLATOR;  Surgeon: Idalia Mcarthur MD;  Location: [facility]     Family History   Problem Relation Age of Onset     Lipids Mother      Arthritis Mother      Psychotic Disorder Mother      Respiratory Mother         copd     Circulatory Mother         BLOOD CLOTS     Genitourinary Problems Mother      Heart Failure Mother      Depression Mother      Mental Illness Mother         Bi-polar     Obesity Mother      Lipids Father      Alcohol/Drug Father      Arthritis Father      C.A.D. Father      Cancer - colorectal Father       Hypertension Father      Cerebrovascular Disease Father         10/2018     Colon Cancer Father      Substance Abuse Father      Anesthesia Reaction Father      Lung Cancer Father      Heart Disease Maternal Grandmother      Breast Cancer Maternal Grandmother      Cerebrovascular Disease Maternal Grandmother      Alcohol/Drug Maternal Grandfather      Cerebrovascular Disease Maternal Grandfather      Alcohol/Drug Paternal Grandmother      Hypertension Paternal Grandmother      C.A.D. Paternal Grandfather      Gastrointestinal Disease Paternal Grandfather         liver diease     Alcohol/Drug Paternal Grandfather      Cerebrovascular Disease Paternal Grandfather      Circulatory Sister         BLOOD CLOTS     Diabetes Brother      Diabetes Sister      Social History     Socioeconomic History     Marital status:      Spouse name: Calvin     Number of children: 3     Years of education: Not on file     Highest education level: Not on file   Occupational History     Occupation: student      Employer: [employer]     Comment: writing instructor   Tobacco Use     Smoking status: Never Smoker     Smokeless tobacco: Never Used   Substance and Sexual Activity     Alcohol use: Yes     Comment: 1-2 times per month     Drug use: No     Sexual activity: Yes     Partners: Male     Birth control/protection: Post-menopausal, Female Surgical     Comment: Tubal ligation 11/08/2008.   Other Topics Concern     Parent/sibling w/ CABG, MI or angioplasty before 65F 55M? Yes     Comment: Father   Social History Narrative    Caffeine intake/servings daily - 1    Calcium intake/servings daily - 2-3    Exercise 2 times weekly - describe walking, treadmill, running    Sunscreen used - Yes    Seatbelts used - Yes    Guns stored in the home - No    Self Breast Exam - Yes    Pap test up to date -  Yes, as of today, 6/7/2010 Normal    Eye exam up to date -  Yes    Dental exam up to date -  Yes    DEXA scan up to date -  Not  Applicable    Flex Sig/Colonoscopy up to date -  Not Applicable    Mammography up to date -  Not Applicable    Immunizations reviewed and up to date - Yes, tetanus 3/9/2009    Abuse: Current or Past (Physical, Sexual or Emotional) - No    Do you feel safe in your environment - Yes    Do you cope well with stress - Yes    Do you suffer from insomnia - Yes, staying asleep    Last updated by: Amalia Perez CMA 8/29/2011                                     Social Determinants of Health     Financial Resource Strain: Not on file   Food Insecurity: Not on file   Transportation Needs: Not on file   Physical Activity: Not on file   Stress: Not on file   Social Connections: Not on file   Intimate Partner Violence: Not At Risk     Fear of Current or Ex-Partner: No     Emotionally Abused: No     Physically Abused: No     Sexually Abused: No   Housing Stability: Not on file       Current Outpatient Medications:      acetaminophen (TYLENOL) 325 MG tablet, Take 325-650 mg by mouth every 6 hours as needed for mild pain, Disp: , Rfl:      hydrochlorothiazide (HYDRODIURIL) 25 MG tablet, Take 1 tablet (25 mg) by mouth daily, Disp: 90 tablet, Rfl: 1     losartan (COZAAR) 100 MG tablet, Take 1 tablet (100 mg) by mouth daily, Disp: 90 tablet, Rfl: 1     metroNIDAZOLE (METROGEL) 0.75 % gel, USE 1 APPLICATION QD, Disp: , Rfl: 3     MULTI-VITAMIN OR TABS, , Disp: , Rfl: 0     nystatin (MYCOSTATIN) 596503 UNIT/GM external powder, Apply topically 2 times daily as needed (Patient not taking: Reported on 11/24/2021), Disp: 45 g, Rfl: 1     Allergies   Allergen Reactions     Depo-Medrol [Methylprednisolone] Other (See Comments)     Oral thrush after receiving an intra-articular injection, reports that she has family members that have also had this symptom following intra-articular injections.     Dust Mites      Sneezing     Mold Cough     Tape [Adhesive Tape] Rash       Past medical, surgical, social and family history were reviewed and  "updated in EPIC.    ROS:   C:       NEGATIVE for fever, chills, change in weight  I:         NEGATIVE for worrisome rashes, moles or lesions  E:       NEGATIVE for vision changes or irritation  E/M:   NEGATIVE for ear, mouth and throat problems  R:       NEGATIVE for significant cough or SOB  CV:     NEGATIVE for chest pain, palpitations or peripheral edema  GI:      NEGATIVE for nausea, abdominal pain, heartburn, or change in bowel habits  :    NEGATIVE for frequency, dysuria, hematuria, vaginal discharge, or bleeding  M:       NEGATIVE for significant arthralgias or myalgia  N:       NEGATIVE for weakness, dizziness or paresthesias  E:       NEGATIVE for temperature intolerance, skin/hair changes  P:       NEGATIVE for changes in mood or affect    EXAM:  /87   Temp (!) 95.3  F (35.2  C)   Ht 1.676 m (5' 6\")   Wt 110.5 kg (243 lb 8 oz)   LMP 05/31/2016 (LMP Unknown)   BMI 39.30 kg/m     BMI: Body mass index is 39.3 kg/m .  Constitutional: healthy, alert and no distress  Head: Normocephalic. No masses, lesions, tenderness or abnormalities  Neck: Neck supple. Trachea midline. No adenopathy. Thyroid symmetric, normal size.   Cardiovascular: RRR.   Respiratory: Negative.   Breast: No nodularity, asymmetry or nipple discharge bilaterally.  Gastrointestinal: Abdomen soft, non-tender, non-distended. No masses, organomegaly  :  Vulva:  No external lesions, normal female hair distribution, no inguinal adenopathy.    Urethra:  Midline, non-tender, well supported, no discharge  Vagina:  Atrophic, no abnormal discharge, no lesions  Uterus:  Normal size, non-tender, freely mobile  Ovaries:  No masses appreciated, non-tender, mobile  Rectal Exam: deferred  Musculoskeletal: extremities normal  Skin: no suspicious lesions or rashes  Psychiatric: Affect appropriate, cooperative,mentation appears normal.     COUNSELING:   Reviewed preventive health counseling, as reflected in patient instructions  Special attention " given to:        Regular exercise       Healthy diet/nutrition       Osteoporosis prevention/bone health       Colon cancer screening       (Lucrecia)menopause management   reports that she has never smoked. She has never used smokeless tobacco.    Body mass index is 39.3 kg/m .  Weight management plan: Discussed healthy diet and exercise guidelines    FRAX Risk Assessment  ASSESSMENT:  51 year old  with satisfactory annual exam  Plan: cotesting done.  Labs with PCP.  mammo and colonoscopy UTD.  RTC yearly or prn.    BRANDIE HAMPTON MD

## 2021-11-25 ASSESSMENT — PATIENT HEALTH QUESTIONNAIRE - PHQ9: SUM OF ALL RESPONSES TO PHQ QUESTIONS 1-9: 3

## 2021-11-29 LAB
BKR LAB AP GYN ADEQUACY: NORMAL
BKR LAB AP GYN INTERPRETATION: NORMAL
BKR LAB AP HPV REFLEX: NORMAL
BKR LAB AP PREVIOUS ABNORMAL: NORMAL
PATH REPORT.COMMENTS IMP SPEC: NORMAL
PATH REPORT.COMMENTS IMP SPEC: NORMAL
PATH REPORT.RELEVANT HX SPEC: NORMAL

## 2021-12-01 LAB
HUMAN PAPILLOMA VIRUS 16 DNA: NEGATIVE
HUMAN PAPILLOMA VIRUS 18 DNA: NEGATIVE
HUMAN PAPILLOMA VIRUS FINAL DIAGNOSIS: NORMAL
HUMAN PAPILLOMA VIRUS OTHER HR: NEGATIVE

## 2022-01-14 ENCOUNTER — OFFICE VISIT (OUTPATIENT)
Dept: FAMILY MEDICINE | Facility: CLINIC | Age: 52
End: 2022-01-14
Payer: COMMERCIAL

## 2022-01-14 VITALS
TEMPERATURE: 97.8 F | HEIGHT: 65 IN | SYSTOLIC BLOOD PRESSURE: 126 MMHG | HEART RATE: 93 BPM | WEIGHT: 244.4 LBS | DIASTOLIC BLOOD PRESSURE: 76 MMHG | BODY MASS INDEX: 40.72 KG/M2 | OXYGEN SATURATION: 98 %

## 2022-01-14 DIAGNOSIS — R10.13 DYSPEPSIA: Primary | ICD-10-CM

## 2022-01-14 DIAGNOSIS — E66.01 MORBID OBESITY (H): ICD-10-CM

## 2022-01-14 DIAGNOSIS — I10 HYPERTENSION GOAL BP (BLOOD PRESSURE) < 140/90: ICD-10-CM

## 2022-01-14 DIAGNOSIS — Z11.59 NEED FOR HEPATITIS C SCREENING TEST: ICD-10-CM

## 2022-01-14 PROCEDURE — 99214 OFFICE O/P EST MOD 30 MIN: CPT | Performed by: PHYSICIAN ASSISTANT

## 2022-01-14 RX ORDER — HYDROCHLOROTHIAZIDE 25 MG/1
25 TABLET ORAL DAILY
Qty: 90 TABLET | Refills: 1 | Status: SHIPPED | OUTPATIENT
Start: 2022-01-14 | End: 2022-02-11

## 2022-01-14 RX ORDER — LOSARTAN POTASSIUM 100 MG/1
100 TABLET ORAL DAILY
Qty: 90 TABLET | Refills: 1 | Status: SHIPPED | OUTPATIENT
Start: 2022-01-14 | End: 2022-05-14

## 2022-01-14 ASSESSMENT — MIFFLIN-ST. JEOR: SCORE: 1731.34

## 2022-01-14 ASSESSMENT — PATIENT HEALTH QUESTIONNAIRE - PHQ9
5. POOR APPETITE OR OVEREATING: NOT AT ALL
SUM OF ALL RESPONSES TO PHQ QUESTIONS 1-9: 3

## 2022-01-14 ASSESSMENT — ANXIETY QUESTIONNAIRES
2. NOT BEING ABLE TO STOP OR CONTROL WORRYING: SEVERAL DAYS
3. WORRYING TOO MUCH ABOUT DIFFERENT THINGS: SEVERAL DAYS
7. FEELING AFRAID AS IF SOMETHING AWFUL MIGHT HAPPEN: NOT AT ALL
GAD7 TOTAL SCORE: 3
5. BEING SO RESTLESS THAT IT IS HARD TO SIT STILL: NOT AT ALL
1. FEELING NERVOUS, ANXIOUS, OR ON EDGE: SEVERAL DAYS
6. BECOMING EASILY ANNOYED OR IRRITABLE: NOT AT ALL

## 2022-01-14 NOTE — PROGRESS NOTES
"  Assessment & Plan     Hypertension goal BP (blood pressure) < 140/90  - hydrochlorothiazide (HYDRODIURIL) 25 MG tablet; Take 1 tablet (25 mg) by mouth daily  - losartan (COZAAR) 100 MG tablet; Take 1 tablet (100 mg) by mouth daily    Morbid obesity (H)    Dyspepsia  - omeprazole (PRILOSEC) 20 MG DR capsule; Take 1 capsule (20 mg) by mouth daily  - Helicobacter pylori Antigen Stool; Future          Return in about 4 weeks (around 2/11/2022) for using a phone visit.    Marcos Arboleda PA-C  [facility] Bernardo is a 51 year old who presents for the following health issues  accompanied by her self.    History of Present Illness       Hypertension: She presents for follow up of hypertension.  She does check blood pressure  regularly outside of the clinic. Outpatient blood pressures have not been over 140/90. She follows a low salt diet.     She eats 2-3 servings of fruits and vegetables daily.She consumes 1 sweetened beverage(s) daily.She exercises with enough effort to increase her heart rate 10 to 19 minutes per day.  She exercises with enough effort to increase her heart rate 3 or less days per week.   She is taking medications regularly.       Patient still notes stomach ache after eating x 3 months.  Patient will start metamucil daily as she still has soft stool.  PHQ-9 and MYRON-7 obtained and reviewed.     Review of Systems   Constitutional, HEENT, cardiovascular, pulmonary, gi and gu systems are negative, except as otherwise noted.      Objective    /76   Pulse 93   Temp 97.8  F (36.6  C) (Oral)   Ht 1.662 m (5' 5.43\")   Wt 110.9 kg (244 lb 6.4 oz)   LMP 05/31/2016 (LMP Unknown)   SpO2 98%   BMI 40.13 kg/m    Body mass index is 40.13 kg/m .  Physical Exam   GENERAL: healthy, alert and no distress  RESP: lungs clear to auscultation - no rales, rhonchi or wheezes  CV: regular rate and rhythm, normal S1 S2, no S3 or S4, no murmur, click or rub, no peripheral edema " and peripheral pulses strong  ABDOMEN: soft, nontender, no hepatosplenomegaly, no masses and bowel sounds normal  MS: no gross musculoskeletal defects noted, no edema  SKIN: no suspicious lesions or rashes

## 2022-01-15 ASSESSMENT — ANXIETY QUESTIONNAIRES: GAD7 TOTAL SCORE: 3

## 2022-01-18 ENCOUNTER — APPOINTMENT (OUTPATIENT)
Dept: LAB | Facility: CLINIC | Age: 52
End: 2022-01-18
Payer: COMMERCIAL

## 2022-01-18 PROCEDURE — 87338 HPYLORI STOOL AG IA: CPT | Performed by: PHYSICIAN ASSISTANT

## 2022-01-19 LAB — H PYLORI AG STL QL IA: NEGATIVE

## 2022-02-11 ENCOUNTER — VIRTUAL VISIT (OUTPATIENT)
Dept: FAMILY MEDICINE | Facility: CLINIC | Age: 52
End: 2022-02-11
Payer: COMMERCIAL

## 2022-02-11 DIAGNOSIS — I10 HYPERTENSION GOAL BP (BLOOD PRESSURE) < 140/90: Primary | ICD-10-CM

## 2022-02-11 DIAGNOSIS — K21.9 GASTROESOPHAGEAL REFLUX DISEASE WITHOUT ESOPHAGITIS: Chronic | ICD-10-CM

## 2022-02-11 PROCEDURE — 99213 OFFICE O/P EST LOW 20 MIN: CPT | Mod: 95 | Performed by: PHYSICIAN ASSISTANT

## 2022-02-11 RX ORDER — ATENOLOL 25 MG/1
25 TABLET ORAL DAILY
Qty: 30 TABLET | Refills: 1 | Status: SHIPPED | OUTPATIENT
Start: 2022-02-11 | End: 2022-03-10

## 2022-02-11 NOTE — PROGRESS NOTES
Tova is a 51 year old who is being evaluated via a billable telephone visit.      What phone number would you like to be contacted at? [phone]  How would you like to obtain your AVS? MyChart    Assessment & Plan     Hypertension goal BP (blood pressure) < 140/90  - atenolol (TENORMIN) 25 MG tablet; Take 1 tablet (25 mg) by mouth daily        Return in about 4 weeks (around 3/11/2022) for using a phone visit.    Marcos Arboleda PA-C  [facility]    Subjective   Tova is a 51 year old who presents for the following health issues  accompanied by her self.    History of Present Illness       Hypertension: She presents for follow up of hypertension.  She does check blood pressure  regularly outside of the clinic. Outside blood pressures have been over 140/90. She follows a low salt diet.     She eats 2-3 servings of fruits and vegetables daily.She consumes 1 sweetened beverage(s) daily.She exercises with enough effort to increase her heart rate 10 to 19 minutes per day.  She exercises with enough effort to increase her heart rate 3 or less days per week.   She is taking medications regularly.       Patient presents with:  Results: follow up      Patient has noticed significant dizziness with hydrochlorothiazide. Amenable to swap this out and try Atenolol.  Doing well managing reflux.      Review of Systems   Constitutional, HEENT, cardiovascular, pulmonary, gi and gu systems are negative, except as otherwise noted.      Objective           Vitals:  No vitals were obtained today due to virtual visit.    Physical Exam   healthy, alert and no distress  PSYCH: Alert and oriented times 3; coherent speech, normal   rate and volume, able to articulate logical thoughts, able   to abstract reason, no tangential thoughts, no hallucinations   or delusions  Her affect is normal  RESP: No cough, no audible wheezing, able to talk in full sentences  Remainder of exam unable to be completed due to telephone  visits                Phone call duration: 10 minutes

## 2022-03-10 ENCOUNTER — OFFICE VISIT (OUTPATIENT)
Dept: FAMILY MEDICINE | Facility: CLINIC | Age: 52
End: 2022-03-10
Payer: COMMERCIAL

## 2022-03-10 VITALS
HEIGHT: 65 IN | DIASTOLIC BLOOD PRESSURE: 60 MMHG | WEIGHT: 245.2 LBS | HEART RATE: 63 BPM | BODY MASS INDEX: 40.85 KG/M2 | OXYGEN SATURATION: 96 % | SYSTOLIC BLOOD PRESSURE: 124 MMHG | TEMPERATURE: 98 F

## 2022-03-10 DIAGNOSIS — R10.13 DYSPEPSIA: ICD-10-CM

## 2022-03-10 DIAGNOSIS — I10 HYPERTENSION GOAL BP (BLOOD PRESSURE) < 140/90: ICD-10-CM

## 2022-03-10 DIAGNOSIS — I10 HYPERTENSION GOAL BP (BLOOD PRESSURE) < 140/90: Primary | ICD-10-CM

## 2022-03-10 PROCEDURE — 99213 OFFICE O/P EST LOW 20 MIN: CPT | Performed by: PHYSICIAN ASSISTANT

## 2022-03-10 RX ORDER — ATENOLOL 25 MG/1
25 TABLET ORAL DAILY
Qty: 30 TABLET | Refills: 1 | Status: SHIPPED | OUTPATIENT
Start: 2022-03-10 | End: 2022-06-02

## 2022-03-10 RX ORDER — FAMOTIDINE 20 MG/1
20 TABLET, FILM COATED ORAL 2 TIMES DAILY
Qty: 60 TABLET | Refills: 1 | Status: SHIPPED | OUTPATIENT
Start: 2022-03-10 | End: 2022-05-14

## 2022-03-10 NOTE — PROGRESS NOTES
"  Assessment & Plan     1. Hypertension goal BP (blood pressure) < 140/90  - atenolol (TENORMIN) 25 MG tablet; Take 1 tablet (25 mg) by mouth daily  Dispense: 30 tablet; Refill: 1    2. Dyspepsia  - famotidine (PEPCID) 20 MG tablet; Take 1 tablet (20 mg) by mouth 2 times daily  Dispense: 60 tablet; Refill: 1             Return in about 3 months (around 6/10/2022) for Follow up.    Marcos Arboleda PA-C  [facility] Bernardo is a 51 year old who presents for the following health issues  accompanied by her self.    History of Present Illness       Hypertension: She presents for follow up of hypertension.  She does check blood pressure  regularly outside of the clinic. Outpatient blood pressures have not been over 140/90. She does not follow a low salt diet.     She eats 2-3 servings of fruits and vegetables daily.She consumes 1 sweetened beverage(s) daily.She exercises with enough effort to increase her heart rate 10 to 19 minutes per day.  She exercises with enough effort to increase her heart rate 3 or less days per week.   She is taking medications regularly.     Patient notes her dizziness has resolved since medication changes.  She is working in diet and exercise. With follow up in 3 months.     Review of Systems   Constitutional, HEENT, cardiovascular, pulmonary, gi and gu systems are negative, except as otherwise noted.      Objective    /60   Pulse 63   Temp 98  F (36.7  C) (Oral)   Ht 1.662 m (5' 5.43\")   Wt 111.2 kg (245 lb 3.2 oz)   LMP 05/31/2016 (LMP Unknown)   SpO2 96%   BMI 40.27 kg/m    Body mass index is 40.27 kg/m .  Physical Exam   GENERAL: healthy, alert and no distress  RESP: lungs clear to auscultation - no rales, rhonchi or wheezes  CV: regular rate and rhythm, normal S1 S2, no S3 or S4, no murmur, click or rub, no peripheral edema and peripheral pulses strong  MS: no gross musculoskeletal defects noted, no edema                "

## 2022-03-11 RX ORDER — ATENOLOL 25 MG/1
TABLET ORAL
Qty: 90 TABLET
Start: 2022-03-11

## 2022-03-11 RX ORDER — FAMOTIDINE 20 MG/1
TABLET, FILM COATED ORAL
Qty: 180 TABLET
Start: 2022-03-11

## 2022-03-11 NOTE — TELEPHONE ENCOUNTER
Duplicate medication request. Medication was filled 3/10/22 and Receipt confirmed by pharmacy (3/10/2022  9:38 AM CST).     Micheline Govea RN   [facility]

## 2022-05-12 DIAGNOSIS — R10.13 DYSPEPSIA: ICD-10-CM

## 2022-05-12 DIAGNOSIS — I10 HYPERTENSION GOAL BP (BLOOD PRESSURE) < 140/90: ICD-10-CM

## 2022-05-12 NOTE — TELEPHONE ENCOUNTER
Hello,  I am trying to get another 90 day supply of Losartan 100mg tablets but my former provider, Wilman Vasquez, is listed on the the prescription so Jose F isn't renewing it. I am not due to come in to see you until July.      I will also need another round of Famotidine 20mg tablets before then.      Thank you,  Janelle Byrd

## 2022-05-14 RX ORDER — LOSARTAN POTASSIUM 100 MG/1
100 TABLET ORAL DAILY
Qty: 90 TABLET | Refills: 0 | Status: SHIPPED | OUTPATIENT
Start: 2022-05-14 | End: 2022-06-29

## 2022-05-14 RX ORDER — FAMOTIDINE 20 MG/1
20 TABLET, FILM COATED ORAL 2 TIMES DAILY
Qty: 180 TABLET | Refills: 0 | Status: SHIPPED | OUTPATIENT
Start: 2022-05-14 | End: 2022-06-29

## 2022-06-01 ENCOUNTER — MYC MEDICAL ADVICE (OUTPATIENT)
Dept: FAMILY MEDICINE | Facility: CLINIC | Age: 52
End: 2022-06-01
Payer: COMMERCIAL

## 2022-06-01 DIAGNOSIS — I10 HYPERTENSION GOAL BP (BLOOD PRESSURE) < 140/90: ICD-10-CM

## 2022-06-02 DIAGNOSIS — I10 HYPERTENSION GOAL BP (BLOOD PRESSURE) < 140/90: ICD-10-CM

## 2022-06-02 RX ORDER — ATENOLOL 25 MG/1
TABLET ORAL
Qty: 90 TABLET | Refills: 0 | Status: SHIPPED | OUTPATIENT
Start: 2022-06-02 | End: 2022-06-29

## 2022-06-02 RX ORDER — ATENOLOL 25 MG/1
25 TABLET ORAL DAILY
Qty: 30 TABLET | Refills: 0 | Status: SHIPPED | OUTPATIENT
Start: 2022-06-02 | End: 2022-06-02

## 2022-06-02 NOTE — TELEPHONE ENCOUNTER
"Requested Prescriptions   Signed Prescriptions Disp Refills    atenolol (TENORMIN) 25 MG tablet 90 tablet 0     Sig: TAKE 1 TABLET(25 MG) BY MOUTH DAILY       Beta-Blockers Protocol Passed - 6/2/2022  9:12 AM        Passed - Blood pressure under 140/90 in past 12 months     BP Readings from Last 3 Encounters:   03/10/22 124/60   01/14/22 126/76   11/24/21 126/87                 Passed - Patient is age 6 or older        Passed - Recent (12 mo) or future (30 days) visit within the authorizing provider's specialty     Patient has had an office visit with the authorizing provider or a provider within the authorizing providers department within the previous 12 mos or has a future within next 30 days. See \"Patient Info\" tab in inbasket, or \"Choose Columns\" in Meds & Orders section of the refill encounter.              Passed - Medication is active on med list           Asia Miller RN  [facility]     "

## 2022-06-24 ASSESSMENT — ENCOUNTER SYMPTOMS
FEVER: 0
JOINT SWELLING: 1
WEAKNESS: 0
FREQUENCY: 0
DYSURIA: 0
HEADACHES: 0
HEMATOCHEZIA: 0
BREAST MASS: 0
NERVOUS/ANXIOUS: 0
NAUSEA: 0
HEARTBURN: 0
ARTHRALGIAS: 1
SHORTNESS OF BREATH: 0
ABDOMINAL PAIN: 0
PALPITATIONS: 0
MYALGIAS: 0
CONSTIPATION: 0
CHILLS: 0
DIARRHEA: 0
PARESTHESIAS: 0
SORE THROAT: 0
HEMATURIA: 0
COUGH: 0
DIZZINESS: 0
EYE PAIN: 0

## 2022-06-27 ENCOUNTER — ANCILLARY PROCEDURE (OUTPATIENT)
Dept: MAMMOGRAPHY | Facility: CLINIC | Age: 52
End: 2022-06-27
Attending: FAMILY MEDICINE
Payer: COMMERCIAL

## 2022-06-27 DIAGNOSIS — Z12.31 VISIT FOR SCREENING MAMMOGRAM: ICD-10-CM

## 2022-06-27 PROCEDURE — 77067 SCR MAMMO BI INCL CAD: CPT | Mod: TC | Performed by: RADIOLOGY

## 2022-06-29 ENCOUNTER — OFFICE VISIT (OUTPATIENT)
Dept: FAMILY MEDICINE | Facility: CLINIC | Age: 52
End: 2022-06-29
Payer: COMMERCIAL

## 2022-06-29 VITALS
HEIGHT: 66 IN | HEART RATE: 68 BPM | WEIGHT: 234 LBS | DIASTOLIC BLOOD PRESSURE: 66 MMHG | SYSTOLIC BLOOD PRESSURE: 116 MMHG | TEMPERATURE: 98 F | OXYGEN SATURATION: 98 % | BODY MASS INDEX: 37.61 KG/M2

## 2022-06-29 DIAGNOSIS — R10.13 DYSPEPSIA: ICD-10-CM

## 2022-06-29 DIAGNOSIS — I10 HYPERTENSION GOAL BP (BLOOD PRESSURE) < 140/90: ICD-10-CM

## 2022-06-29 DIAGNOSIS — E66.01 MORBID OBESITY (H): ICD-10-CM

## 2022-06-29 DIAGNOSIS — Z00.00 ROUTINE GENERAL MEDICAL EXAMINATION AT A HEALTH CARE FACILITY: Primary | ICD-10-CM

## 2022-06-29 DIAGNOSIS — Z11.59 NEED FOR HEPATITIS C SCREENING TEST: ICD-10-CM

## 2022-06-29 PROCEDURE — 99396 PREV VISIT EST AGE 40-64: CPT | Performed by: PHYSICIAN ASSISTANT

## 2022-06-29 RX ORDER — LOSARTAN POTASSIUM 100 MG/1
100 TABLET ORAL DAILY
Qty: 90 TABLET | Refills: 3 | Status: SHIPPED | OUTPATIENT
Start: 2022-06-29 | End: 2023-07-17

## 2022-06-29 RX ORDER — ATENOLOL 25 MG/1
25 TABLET ORAL DAILY
Qty: 90 TABLET | Refills: 3 | Status: SHIPPED | OUTPATIENT
Start: 2022-06-29 | End: 2023-07-07

## 2022-06-29 RX ORDER — FAMOTIDINE 20 MG/1
20 TABLET, FILM COATED ORAL 2 TIMES DAILY
Qty: 180 TABLET | Refills: 3 | Status: SHIPPED | OUTPATIENT
Start: 2022-06-29 | End: 2023-07-17

## 2022-06-29 ASSESSMENT — ENCOUNTER SYMPTOMS
DYSURIA: 0
PALPITATIONS: 0
WEAKNESS: 0
SORE THROAT: 0
PARESTHESIAS: 0
BREAST MASS: 0
ARTHRALGIAS: 1
HEARTBURN: 0
DIZZINESS: 0
FEVER: 0
CONSTIPATION: 0
HEMATOCHEZIA: 0
HEADACHES: 0
SHORTNESS OF BREATH: 0
NAUSEA: 0
FREQUENCY: 0
HEMATURIA: 0
DIARRHEA: 0
CHILLS: 0
MYALGIAS: 0
JOINT SWELLING: 1
NERVOUS/ANXIOUS: 0
EYE PAIN: 0
COUGH: 0
ABDOMINAL PAIN: 0

## 2022-06-29 NOTE — PROGRESS NOTES
SUBJECTIVE:   CC: Janelle Byrd is an 51 year old woman who presents for preventive health visit.       Patient has been advised of split billing requirements and indicates understanding: Yes  Healthy Habits:     Getting at least 3 servings of Calcium per day:  Yes    Bi-annual eye exam:  Yes    Dental care twice a year:  Yes    Sleep apnea or symptoms of sleep apnea:  Excessive snoring and Sleep apnea    Diet:  Low salt    Frequency of exercise:  1 day/week    Duration of exercise:  Less than 15 minutes    Taking medications regularly:  Yes    Barriers to taking medications:  None    Medication side effects:  Not applicable    PHQ-2 Total Score: 0    Additional concerns today:  Yes          -------------------------------------    Today's PHQ-2 Score:   PHQ-2 ( 1999 Pfizer) 6/24/2022   Q1: Little interest or pleasure in doing things 0   Q2: Feeling down, depressed or hopeless 0   PHQ-2 Score 0   PHQ-2 Total Score (12-17 Years)- Positive if 3 or more points; Administer PHQ-A if positive -   Q1: Little interest or pleasure in doing things Not at all   Q2: Feeling down, depressed or hopeless Not at all   PHQ-2 Score 0       Abuse: Current or Past (Physical, Sexual or Emotional) - No  Do you feel safe in your environment? Yes    Have you ever done Advance Care Planning? (For example, a Health Directive, POLST, or a discussion with a medical provider or your loved ones about your wishes): No, advance care planning information given to patient to review.  Patient declined advance care planning discussion at this time.    Social History     Tobacco Use     Smoking status: Never Smoker     Smokeless tobacco: Never Used   Substance Use Topics     Alcohol use: Yes     Comment: 1-2 times per month         Alcohol Use 6/24/2022   Prescreen: >3 drinks/day or >7 drinks/week? No       Reviewed orders with patient.  Reviewed health maintenance and updated orders accordingly - Yes  Lab work is in  process    Breast Cancer Screening:    FHS-7:   Breast CA Risk Assessment (FHS-7) 6/24/2022 6/27/2022   Did any of your first-degree relatives have breast or ovarian cancer? No No   Did any of your relatives have bilateral breast cancer? No No   Did any man in your family have breast cancer? No No   Did any woman in your family have breast and ovarian cancer? No No   Did any woman in your family have breast cancer before age 50 y? No No   Do you have 2 or more relatives with breast and/or ovarian cancer? No No   Do you have 2 or more relatives with breast and/or bowel cancer? Yes No       Mammogram Screening: Recommended annual mammography  Pertinent mammograms are reviewed under the imaging tab.    History of abnormal Pap smear: NO - age 30-65 PAP every 5 years with negative HPV co-testing recommended  PAP / HPV Latest Ref Rng & Units 11/24/2021 11/17/2015 11/9/2012   PAP   Negative for Intraepithelial Lesion or Malignancy (NILM) - -   PAP (Historical) - - NIL NIL   HPV16 Negative Negative Negative -   HPV18 Negative Negative Negative -   HRHPV Negative Negative Negative -     Reviewed and updated as needed this visit by clinical staff   Tobacco     Med Hx  Surg Hx  Fam Hx  Soc Hx                Review of Systems   Constitutional: Negative for chills and fever.   HENT: Negative for congestion, ear pain, hearing loss and sore throat.    Eyes: Negative for pain and visual disturbance.   Respiratory: Negative for cough and shortness of breath.    Cardiovascular: Negative for chest pain, palpitations and peripheral edema.   Gastrointestinal: Negative for abdominal pain, constipation, diarrhea, heartburn, hematochezia and nausea.   Breasts:  Negative for tenderness, breast mass and discharge.   Genitourinary: Negative for dysuria, frequency, genital sores, hematuria, pelvic pain, urgency, vaginal bleeding and vaginal discharge.   Musculoskeletal: Positive for arthralgias and joint swelling. Negative for myalgias.  "  Skin: Negative for rash.   Neurological: Negative for dizziness, weakness, headaches and paresthesias.   Psychiatric/Behavioral: Negative for mood changes. The patient is not nervous/anxious.           OBJECTIVE:   /66   Pulse 68   Temp 98  F (36.7  C) (Oral)   Ht 1.666 m (5' 5.59\")   Wt 106.1 kg (234 lb)   LMP 05/31/2016 (LMP Unknown)   SpO2 98%   BMI 38.24 kg/m       Physical Exam  GENERAL: healthy, alert and no distress  EYES: Eyes grossly normal to inspection, PERRL and conjunctivae and sclerae normal  HENT: ear canals and TM's normal, nose and mouth without ulcers or lesions  NECK: no adenopathy, no asymmetry, masses, or scars and thyroid normal to palpation  RESP: lungs clear to auscultation - no rales, rhonchi or wheezes  BREAST: normal without masses, tenderness or nipple discharge and no palpable axillary masses or adenopathy  CV: regular rate and rhythm, normal S1 S2, no S3 or S4, no murmur, click or rub, no peripheral edema and peripheral pulses strong  ABDOMEN: soft, nontender, no hepatosplenomegaly, no masses and bowel sounds normal  MS: no gross musculoskeletal defects noted, no edema  SKIN: no suspicious lesions or rashes  NEURO: Normal strength and tone, mentation intact and speech normal  PSYCH: mentation appears normal, affect normal/bright    Diagnostic Test Results:  none     ASSESSMENT/PLAN:       ICD-10-CM    1. Routine general medical examination at a health care facility  Z00.00    2. Hypertension goal BP (blood pressure) < 140/90  I10 atenolol (TENORMIN) 25 MG tablet     losartan (COZAAR) 100 MG tablet   3. Dyspepsia  R10.13 famotidine (PEPCID) 20 MG tablet   4. Need for hepatitis C screening test  Z11.59    5. Morbid obesity (H)  E66.01        Patient has been advised of split billing requirements and indicates understanding: Yes    COUNSELING:  Reviewed preventive health counseling, as reflected in patient instructions    Estimated body mass index is 38.24 kg/m  as " "calculated from the following:    Height as of this encounter: 1.666 m (5' 5.59\").    Weight as of this encounter: 106.1 kg (234 lb).    Weight management plan: Discussed healthy diet and exercise guidelines    She reports that she has never smoked. She has never used smokeless tobacco.      Counseling Resources:  ATP IV Guidelines  Pooled Cohorts Equation Calculator  Breast Cancer Risk Calculator  BRCA-Related Cancer Risk Assessment: FHS-7 Tool  FRAX Risk Assessment  ICSI Preventive Guidelines  Dietary Guidelines for Americans, 2010  USDA's MyPlate  ASA Prophylaxis  Lung CA Screening    RUDDY [facility]  "

## 2022-08-02 NOTE — MR AVS SNAPSHOT
After Visit Summary   10/22/2018    Janelle Byrd    MRN: [id number]           Patient Information     Date Of Birth          1970        Visit Information        Provider Department      10/22/2018 9:30 AM BK BED 5 [facility]        Today's Diagnoses     Class 3 severe obesity due to excess calories without serious comorbidity with body mass index (BMI) of 40.0 to 44.9 in adult (H)        Organic hypersomnia        Disturbance in sleep behavior        Dyspnea and respiratory abnormality           Follow-ups after your visit        Your next 10 appointments already scheduled     Oct 23, 2018  9:30 AM CDT   HST Drop Off with BK BED 5   [facility] ([facility])    [address]  [facility] [id number] [phone]            Oct 31, 2018 11:40 AM CDT   Return Sleep Patient with Surinder Simmons MD   [facility] ([facility])    [address]  [facility] [id number] [phone]              Who to contact     If you have questions or need follow up information about today's clinic visit or your schedule please contact [facility] directly at [phone].  Normal or non-critical lab and imaging results will be communicated to you by Justinmindhart, letter or phone within 4 business days after the clinic has received the results. If you do not hear from us within 7 days, please contact the clinic through Justinmindhart or phone. If you have a critical or abnormal lab result, we will notify you by phone as soon as possible.  Submit refill requests through [employer] or call your pharmacy and they will forward the refill request to us. Please allow 3 business days for your refill to be completed.          Additional Information About Your Visit        [employer] [address] [employer] Information     [employer] gives you secure access to your electronic health record.  If you see a primary care provider, you can also send messages to your care team and make appointments. If you have questions, please call your primary care clinic.  If you do not have a primary care provider, please call [phone] and they will assist you.        Care EveryWhere ID     This is your Care EveryWhere ID. This could be used by other organizations to access your [facility] medical records  [id number]         Blood Pressure from Last 3 Encounters:   10/15/18 132/85   02/06/18 132/72   01/02/18 120/78    Weight from Last 3 Encounters:   10/15/18 119.7 kg (263 lb 12.8 oz)   02/06/18 115.4 kg (254 lb 8 oz)   01/02/18 115.6 kg (254 lb 12.8 oz)              We Performed the Following     HST-Home Sleep Apnea Test        Primary Care Provider Office Phone # Fax #    Yamilka Hill PA-C [phone]       [address] [address]        Equal Access to Services     YVONNE NICHOLS : Hadii aad ku hadasho Soomaali, waaxda luqadaha, qaybta kaalmada adeegyada, waxay mechein haybill mayorga . So [facility] [phone].    ATENCIÓN: Si habla español, tiene a fowler disposición servicios gratuitos de asistencia lingüística. Llame al [phone].    We comply with applicable federal civil rights laws and [place] laws. We do not discriminate on the basis of race, color, national origin, age, disability, sex, sexual orientation, or gender identity.            Thank you!     Thank you for choosing [facility]  for your care. Our goal is always to provide you with excellent care. Hearing back from our patients is one way we can continue to improve our services. Please take a few minutes to complete the written survey that you may receive in the mail after your visit with us. Thank you!             Your Updated Medication List - Protect others around you: Learn how to safely use, store and throw away your medicines at www.disposemymeds.org.          This list is  accurate as of 10/22/18  9:49 AM.  Always use your most recent med list.                   Brand Name Dispense Instructions for use Diagnosis    ALEVE PO      Take 220 mg by mouth        IBUPROFEN PO           metroNIDAZOLE 0.75 % topical gel    METROGEL     USE 1 APPLICATION QD        Multi-vitamin Tabs tablet   Generic drug:  multivitamin, therapeutic with minerals      1 tablet daily           Zyclara Counseling:  I discussed with the patient the risks of imiquimod including but not limited to erythema, scaling, itching, weeping, crusting, and pain.  Patient understands that the inflammatory response to imiquimod is variable from person to person and was educated regarded proper titration schedule.  If flu-like symptoms develop, patient knows to discontinue the medication and contact us.

## 2022-09-30 ENCOUNTER — TRANSFERRED RECORDS (OUTPATIENT)
Dept: HEALTH INFORMATION MANAGEMENT | Facility: CLINIC | Age: 52
End: 2022-09-30

## 2022-11-20 ENCOUNTER — HEALTH MAINTENANCE LETTER (OUTPATIENT)
Age: 52
End: 2022-11-20

## 2023-06-28 ENCOUNTER — ANCILLARY PROCEDURE (OUTPATIENT)
Dept: MAMMOGRAPHY | Facility: CLINIC | Age: 53
End: 2023-06-28
Attending: FAMILY MEDICINE
Payer: COMMERCIAL

## 2023-06-28 DIAGNOSIS — Z12.31 VISIT FOR SCREENING MAMMOGRAM: ICD-10-CM

## 2023-06-28 PROCEDURE — 77067 SCR MAMMO BI INCL CAD: CPT | Mod: TC | Performed by: RADIOLOGY

## 2023-07-07 DIAGNOSIS — I10 HYPERTENSION GOAL BP (BLOOD PRESSURE) < 140/90: ICD-10-CM

## 2023-07-07 RX ORDER — ATENOLOL 25 MG/1
TABLET ORAL
Qty: 90 TABLET | OUTPATIENT
Start: 2023-07-07

## 2023-07-07 RX ORDER — ATENOLOL 25 MG/1
25 TABLET ORAL DAILY
Qty: 30 TABLET | Refills: 0 | Status: SHIPPED | OUTPATIENT
Start: 2023-07-07 | End: 2023-07-17

## 2023-07-17 ENCOUNTER — OFFICE VISIT (OUTPATIENT)
Dept: FAMILY MEDICINE | Facility: CLINIC | Age: 53
End: 2023-07-17
Payer: COMMERCIAL

## 2023-07-17 VITALS
WEIGHT: 251.4 LBS | OXYGEN SATURATION: 97 % | HEIGHT: 66 IN | DIASTOLIC BLOOD PRESSURE: 85 MMHG | HEART RATE: 65 BPM | BODY MASS INDEX: 40.4 KG/M2 | SYSTOLIC BLOOD PRESSURE: 121 MMHG

## 2023-07-17 DIAGNOSIS — R73.9 ELEVATED RANDOM BLOOD GLUCOSE LEVEL: ICD-10-CM

## 2023-07-17 DIAGNOSIS — R73.03 PREDIABETES: ICD-10-CM

## 2023-07-17 DIAGNOSIS — K86.2 PANCREAS CYST: ICD-10-CM

## 2023-07-17 DIAGNOSIS — I10 HYPERTENSION GOAL BP (BLOOD PRESSURE) < 140/90: ICD-10-CM

## 2023-07-17 DIAGNOSIS — R10.13 DYSPEPSIA: ICD-10-CM

## 2023-07-17 DIAGNOSIS — Z13.220 SCREENING FOR HYPERLIPIDEMIA: ICD-10-CM

## 2023-07-17 DIAGNOSIS — Z00.00 ROUTINE GENERAL MEDICAL EXAMINATION AT A HEALTH CARE FACILITY: Primary | ICD-10-CM

## 2023-07-17 LAB
ANION GAP SERPL CALCULATED.3IONS-SCNC: 8 MMOL/L (ref 7–15)
BUN SERPL-MCNC: 10.5 MG/DL (ref 6–20)
CALCIUM SERPL-MCNC: 9.6 MG/DL (ref 8.6–10)
CHLORIDE SERPL-SCNC: 107 MMOL/L (ref 98–107)
CHOLEST SERPL-MCNC: 199 MG/DL
CREAT SERPL-MCNC: 0.82 MG/DL (ref 0.51–0.95)
DEPRECATED HCO3 PLAS-SCNC: 25 MMOL/L (ref 22–29)
ERYTHROCYTE [DISTWIDTH] IN BLOOD BY AUTOMATED COUNT: 12.5 % (ref 10–15)
GFR SERPL CREATININE-BSD FRML MDRD: 86 ML/MIN/1.73M2
GLUCOSE SERPL-MCNC: 112 MG/DL (ref 70–99)
HCT VFR BLD AUTO: 42.1 % (ref 35–47)
HDLC SERPL-MCNC: 45 MG/DL
HGB BLD-MCNC: 13.4 G/DL (ref 11.7–15.7)
LDLC SERPL CALC-MCNC: 127 MG/DL
MCH RBC QN AUTO: 28.9 PG (ref 26.5–33)
MCHC RBC AUTO-ENTMCNC: 31.8 G/DL (ref 31.5–36.5)
MCV RBC AUTO: 91 FL (ref 78–100)
NONHDLC SERPL-MCNC: 154 MG/DL
PLATELET # BLD AUTO: 303 10E3/UL (ref 150–450)
POTASSIUM SERPL-SCNC: 4.6 MMOL/L (ref 3.4–5.3)
RBC # BLD AUTO: 4.64 10E6/UL (ref 3.8–5.2)
SODIUM SERPL-SCNC: 140 MMOL/L (ref 136–145)
TRIGL SERPL-MCNC: 136 MG/DL
WBC # BLD AUTO: 6.7 10E3/UL (ref 4–11)

## 2023-07-17 PROCEDURE — 36415 COLL VENOUS BLD VENIPUNCTURE: CPT | Performed by: PHYSICIAN ASSISTANT

## 2023-07-17 PROCEDURE — 99213 OFFICE O/P EST LOW 20 MIN: CPT | Mod: 25 | Performed by: PHYSICIAN ASSISTANT

## 2023-07-17 PROCEDURE — 80048 BASIC METABOLIC PNL TOTAL CA: CPT | Performed by: PHYSICIAN ASSISTANT

## 2023-07-17 PROCEDURE — 80061 LIPID PANEL: CPT | Performed by: PHYSICIAN ASSISTANT

## 2023-07-17 PROCEDURE — 83036 HEMOGLOBIN GLYCOSYLATED A1C: CPT | Performed by: PHYSICIAN ASSISTANT

## 2023-07-17 PROCEDURE — 85027 COMPLETE CBC AUTOMATED: CPT | Performed by: PHYSICIAN ASSISTANT

## 2023-07-17 PROCEDURE — 99396 PREV VISIT EST AGE 40-64: CPT | Performed by: PHYSICIAN ASSISTANT

## 2023-07-17 RX ORDER — FAMOTIDINE 20 MG/1
20 TABLET, FILM COATED ORAL 2 TIMES DAILY
Qty: 180 TABLET | Refills: 3 | Status: SHIPPED | OUTPATIENT
Start: 2023-07-17

## 2023-07-17 RX ORDER — LOSARTAN POTASSIUM 100 MG/1
100 TABLET ORAL DAILY
Qty: 90 TABLET | Refills: 3 | Status: SHIPPED | OUTPATIENT
Start: 2023-07-17 | End: 2024-07-31

## 2023-07-17 RX ORDER — ATENOLOL 25 MG/1
25 TABLET ORAL DAILY
Qty: 90 TABLET | Refills: 3 | Status: SHIPPED | OUTPATIENT
Start: 2023-07-17 | End: 2024-07-31

## 2023-07-17 ASSESSMENT — ANXIETY QUESTIONNAIRES
5. BEING SO RESTLESS THAT IT IS HARD TO SIT STILL: NOT AT ALL
2. NOT BEING ABLE TO STOP OR CONTROL WORRYING: NOT AT ALL
4. TROUBLE RELAXING: NOT AT ALL
6. BECOMING EASILY ANNOYED OR IRRITABLE: NOT AT ALL
GAD7 TOTAL SCORE: 0
7. FEELING AFRAID AS IF SOMETHING AWFUL MIGHT HAPPEN: NOT AT ALL
1. FEELING NERVOUS, ANXIOUS, OR ON EDGE: NOT AT ALL
GAD7 TOTAL SCORE: 0
3. WORRYING TOO MUCH ABOUT DIFFERENT THINGS: NOT AT ALL

## 2023-07-17 NOTE — PROGRESS NOTES
SUBJECTIVE:   CC: Tova is an 52 year old who presents for preventive health visit.       7/17/2023     8:24 AM   Additional Questions   Roomed by santos     Healthy Habits:     Getting at least 3 servings of Calcium per day:  NO    Bi-annual eye exam:  Yes    Dental care twice a year:  Yes    Sleep apnea or symptoms of sleep apnea:  Sleep apnea    Diet:  Low salt    Frequency of exercise:  2-3 days/week    Duration of exercise:  15-30 minutes    Taking medications regularly:  Yes    Barriers to taking medications:  Not applicable    Medication side effects:  None    Additional concerns today:  No  History of Present Illness       Hypertension: She presents for follow up of hypertension.  She does check blood pressure  regularly outside of the clinic. Outpatient blood pressures have not been over 140/90. She follows a low salt diet.     She eats 2-3 servings of fruits and vegetables daily.She consumes 1 sweetened beverage(s) daily.She exercises with enough effort to increase her heart rate 10 to 19 minutes per day.  She exercises with enough effort to increase her heart rate 3 or less days per week.   She is not taking prescribed medications regularly due to Not applicable.  Today's MYRON-7 Score: 0      Works at [employer] in the summer.   Doing weight watchers - has had success in losing a few pounds. Walks on treadmill.       Today's PHQ-2 Score:       7/17/2023     8:16 AM   PHQ-2 ( 1999 Pfizer)   Q1: Little interest or pleasure in doing things 0   Q2: Feeling down, depressed or hopeless 0   PHQ-2 Score 0   Q1: Little interest or pleasure in doing things Not at all   Q2: Feeling down, depressed or hopeless Not at all   PHQ-2 Score 0                       Social History     Tobacco Use     Smoking status: Never     Smokeless tobacco: Never   Substance Use Topics     Alcohol use: Yes     Comment: 1-2 times per month             6/24/2022    10:30 AM   Alcohol Use   Prescreen: >3 drinks/day or >7 drinks/week? No      Reviewed orders with patient.  Reviewed health maintenance and updated orders accordingly - Yes  BP Readings from Last 3 Encounters:   07/17/23 121/85   06/29/22 116/66   03/10/22 124/60    Wt Readings from Last 3 Encounters:   07/17/23 114 kg (251 lb 6.4 oz)   06/29/22 106.1 kg (234 lb)   03/10/22 111.2 kg (245 lb 3.2 oz)                    Breast Cancer Screening:    FHS-7:       6/24/2022    10:31 AM 6/27/2022     8:48 AM 6/28/2023     7:36 AM 7/17/2023     8:16 AM   Breast CA Risk Assessment (FHS-7)   Did any of your first-degree relatives have breast or ovarian cancer? No No No No   Did any of your relatives have bilateral breast cancer? No No No Yes   Did any man in your family have breast cancer? No No No No   Did any woman in your family have breast and ovarian cancer? No No No No   Did any woman in your family have breast cancer before age 50 y? No No No No   Do you have 2 or more relatives with breast and/or ovarian cancer? No No No No   Do you have 2 or more relatives with breast and/or bowel cancer? Yes No No Yes       Mammogram Screening: Recommended annual mammography  Pertinent mammograms are reviewed under the imaging tab.    History of abnormal Pap smear: NO - age 30-65 PAP every 5 years with negative HPV co-testing recommended      Latest Ref Rng & Units 11/24/2021    10:54 AM 11/17/2015    10:41 AM 11/17/2015    12:00 AM   PAP / HPV   PAP  Negative for Intraepithelial Lesion or Malignancy (NILM)      PAP (Historical)    NIL    HPV 16 DNA Negative Negative  Negative     HPV 18 DNA Negative Negative  Negative     Other HR HPV Negative Negative  Negative       Reviewed and updated as needed this visit by clinical staff   Tobacco  Allergies  Meds              Reviewed and updated as needed this visit by Provider                     Review of Systems  CONSTITUTIONAL: NEGATIVE for fever, chills, change in weight  INTEGUMENTARY/SKIN: NEGATIVE for worrisome rashes, moles or lesions  EYES: NEGATIVE  "for vision changes or irritation  ENT: NEGATIVE for ear, mouth and throat problems  RESP: NEGATIVE for significant cough or SOB  BREAST: NEGATIVE for masses, tenderness or discharge  CV: NEGATIVE for chest pain, palpitations or peripheral edema  GI: NEGATIVE for nausea, abdominal pain, heartburn, or change in bowel habits  : NEGATIVE for unusual urinary or vaginal symptoms. No vaginal bleeding.  MUSCULOSKELETAL: NEGATIVE for significant arthralgias or myalgia  NEURO: NEGATIVE for weakness, dizziness or paresthesias  PSYCHIATRIC: NEGATIVE for changes in mood or affect      OBJECTIVE:   /85 (BP Location: Right arm, Patient Position: Sitting, Cuff Size: Adult Regular)   Pulse 65   Ht 1.67 m (5' 5.75\")   Wt 114 kg (251 lb 6.4 oz)   LMP 05/31/2016 (LMP Unknown)   SpO2 97%   BMI 40.89 kg/m    Physical Exam  GENERAL: healthy, alert and no distress  EYES: Eyes grossly normal to inspection, PERRL and conjunctivae and sclerae normal  HENT: ear canals and TM's normal, nose and mouth without ulcers or lesions  NECK: no adenopathy, no asymmetry, masses, or scars and thyroid normal to palpation  RESP: lungs clear to auscultation - no rales, rhonchi or wheezes  CV: regular rate and rhythm, normal S1 S2, no S3 or S4, no murmur, click or rub, no peripheral edema and peripheral pulses strong  ABDOMEN: soft, nontender, no hepatosplenomegaly, no masses and bowel sounds normal  MS: no gross musculoskeletal defects noted, no edema  SKIN: no suspicious lesions or rashes  NEURO: Normal strength and tone, mentation intact and speech normal  PSYCH: mentation appears normal, affect normal/bright    Diagnostic Test Results:  Labs reviewed in Epic    ASSESSMENT/PLAN:   1. Routine general medical examination at a health care facility  Well adult.   - CBC with platelets; Future  - CBC with platelets    2. Hypertension goal BP (blood pressure) < 140/90  Stable. Refilled.   - BASIC METABOLIC PANEL; Future  - atenolol (TENORMIN) 25 MG " "tablet; Take 1 tablet (25 mg) by mouth daily  Dispense: 90 tablet; Refill: 3  - losartan (COZAAR) 100 MG tablet; Take 1 tablet (100 mg) by mouth daily  Dispense: 90 tablet; Refill: 3  - BASIC METABOLIC PANEL    3. Pancreas cyst  Follow up imaging needed. May need medication for sedation if not able to do open sided. She'll let me know.  - MR Abdomen MRCP w/o & w Contrast; Future    4. Dyspepsia  Refilled.   - famotidine (PEPCID) 20 MG tablet; Take 1 tablet (20 mg) by mouth 2 times daily  Dispense: 180 tablet; Refill: 3    5. Screening for hyperlipidemia  Screen.  - Lipid panel reflex to direct LDL Non-fasting; Future  - Lipid panel reflex to direct LDL Non-fasting           COUNSELING:  Reviewed preventive health counseling, as reflected in patient instructions      BMI:   Estimated body mass index is 40.89 kg/m  as calculated from the following:    Height as of this encounter: 1.67 m (5' 5.75\").    Weight as of this encounter: 114 kg (251 lb 6.4 oz).   Weight management plan: Discussed healthy diet and exercise guidelines      She reports that she has never smoked. She has never used smokeless tobacco.          RUDDY [facility]  "

## 2023-07-19 PROBLEM — R73.03 PREDIABETES: Status: ACTIVE | Noted: 2023-07-19

## 2023-07-19 LAB — HBA1C MFR BLD: 5.9 % (ref 0–5.6)

## 2023-08-02 ENCOUNTER — MYC MEDICAL ADVICE (OUTPATIENT)
Dept: FAMILY MEDICINE | Facility: CLINIC | Age: 53
End: 2023-08-02
Payer: COMMERCIAL

## 2023-08-02 DIAGNOSIS — K86.2 PANCREAS CYST: Primary | ICD-10-CM

## 2023-08-02 NOTE — TELEPHONE ENCOUNTER
Please see [facility] message and advise. Patient is requesting that MRI referral order be re-written or sent to [facility] instead as she prefers their open-sided MRI machines.    Referral order pended.    [facility] in [place] Fax #: [phone]    NATACHA Barakat RN  [facility]

## 2023-08-03 NOTE — TELEPHONE ENCOUNTER
Please fax order to [facility].   Attach 10/17/2021 CT result and 10/19/2021 US result.     Jodi Mims PA-C

## 2024-06-12 NOTE — TELEPHONE ENCOUNTER
She felt intolerant PAP, could reevaluate with sleep specialist.  Did see bariatrics, plans to work on weight loss   lmom for patient to call back to schedule a med check phone appt to establish care with a new provider.    Paxton Valencia

## 2024-07-31 ENCOUNTER — MYC MEDICAL ADVICE (OUTPATIENT)
Dept: FAMILY MEDICINE | Facility: CLINIC | Age: 54
End: 2024-07-31
Payer: COMMERCIAL

## 2024-07-31 DIAGNOSIS — I10 HYPERTENSION GOAL BP (BLOOD PRESSURE) < 140/90: ICD-10-CM

## 2024-07-31 RX ORDER — ATENOLOL 25 MG/1
25 TABLET ORAL DAILY
Qty: 90 TABLET | Refills: 0 | Status: SHIPPED | OUTPATIENT
Start: 2024-07-31

## 2024-07-31 RX ORDER — LOSARTAN POTASSIUM 100 MG/1
100 TABLET ORAL DAILY
Qty: 90 TABLET | Refills: 0 | Status: SHIPPED | OUTPATIENT
Start: 2024-07-31

## 2024-07-31 NOTE — TELEPHONE ENCOUNTER
Called pt, lvm, and sent letter. Please help pt schedule an appointment for a medication check.     Ana MCMULLEN MA

## 2024-07-31 NOTE — TELEPHONE ENCOUNTER
[facility] message sent to patient.     Thanks,  PRESTON Betancourt  [facility]

## 2024-07-31 NOTE — LETTER
July 31, 2024    Janelle Byrd  [address] [place] [address]      Dear Janelle Byrd,     Your provider has sent a  maurice refill of losartan (COZAAR) 100 MG tablet . You are due for an appointment for further refills.  Please contact the clinic to schedule an appointment for further refills. Please call our scheduling line at [phone] or you can schedule via [employer].         Your Health Care Team,    Team Blue                   Eye Protection Verbiage: Before proceeding with the stage, a plastic scleral shield was inserted. The globe was anesthetized with a few drops of 1% lidocaine with 1:100,000 epinephrine. Then, an appropriate sized scleral shield was chosen and coated with lacrilube ointment. The shield was gently inserted and left in place for the duration of each stage. After the stage was completed, the shield was gently removed.

## 2024-08-31 ENCOUNTER — HEALTH MAINTENANCE LETTER (OUTPATIENT)
Age: 54
End: 2024-08-31

## 2024-10-19 NOTE — MR AVS SNAPSHOT
After Visit Summary   10/15/2018    Janelle Byrd    MRN: [id number]           Patient Information     Date Of Birth          1970        Visit Information        Provider Department      10/15/2018 11:00 AM Surinder Simmons MD [facility]        Today's Diagnoses     Dyspnea and respiratory abnormality    -  1    Class 3 severe obesity due to excess calories without serious comorbidity with body mass index (BMI) of 40.0 to 44.9 in adult (H)        Organic hypersomnia        Disturbance in sleep behavior          Care Instructions      Your BMI is Body mass index is 42.58 kg/(m^2).  Weight management is a personal decision.  If you are interested in exploring weight loss strategies, the following discussion covers the approaches that may be successful. Body mass index (BMI) is one way to tell whether you are at a healthy weight, overweight, or obese. It measures your weight in relation to your height.  A BMI of 18.5 to 24.9 is in the healthy range. A person with a BMI of 25 to 29.9 is considered overweight, and someone with a BMI of 30 or greater is considered obese. More than two-thirds of American adults are considered overweight or obese.  Being overweight or obese increases the risk for further weight gain. Excess weight may lead to heart disease and diabetes.  Creating and following plans for healthy eating and physical activity may help you improve your health.  Weight control is part of healthy lifestyle and includes exercise, emotional health, and healthy eating habits. Careful eating habits lifelong are the mainstay of weight control. Though there are significant health benefits from weight loss, long-term weight loss with diet alone may be very difficult to achieve- studies show long-term success with dietary management in less than 10% of people. Attaining a healthy weight may be especially difficult to achieve in those with severe obesity. In some cases,  Leg strength improving. Able to walk today with PT.   medications, devices and surgical management might be considered.  What can you do?  If you are overweight or obese and are interested in methods for weight loss, you should discuss this with your provider.     Consider reducing daily calorie intake by 500 calories.     Keep a food journal.     Avoiding skipping meals, consider cutting portions instead.    Diet combined with exercise helps maintain muscle while optimizing fat loss. Strength training is particularly important for building and maintaining muscle mass. Exercise helps reduce stress, increase energy, and improves fitness. Increasing exercise without diet control, however, may not burn enough calories to loose weight.       Start walking three days a week 10-20 minutes at a time    Work towards walking thirty minutes five days a week     Eventually, increase the speed of your walking for 1-2 minutes at time    In addition, we recommend that you review healthy lifestyles and methods for weight loss available through the National Institutes of Health patient information sites:  http://win.niddk.nih.gov/publications/index.htm    And look into health and wellness programs that may be available through your health insurance provider, employer, local community center, or jonatan club.    Weight management plan: Patient was referred to their PCP to discuss a diet and exercise plan.              Follow-ups after your visit        Follow-up notes from your care team     Return in about 1 day (around 10/16/2018) for Routine Visit.      Your next 10 appointments already scheduled     Oct 22, 2018  9:30 AM CDT   HST  with BK BED 5   [facility] ([facility]    [address] [place] [address]   [phone]            Oct 23, 2018  9:30 AM CDT   HST Drop Off with BK BED 5   [facility] ([facility])    [address] [address]  "[place] [address]   [phone]            Oct 31, 2018 11:40 AM CDT   Return Sleep Patient with Surinder Simmons MD   [facility] ([facility])    [address] [place] [id number]   [phone]              Future tests that were ordered for you today     Open Future Orders        Priority Expected Expires Ordered    HST-Home Sleep Apnea Test Routine  4/16/2019 10/15/2018            Who to contact     If you have questions or need follow up information about today's clinic visit or your schedule please contact [facility] directly at [phone].  Normal or non-critical lab and imaging results will be communicated to you by MyChart, letter or phone within 4 business days after the clinic has received the results. If you do not hear from us within 7 days, please contact the clinic through [employer] or phone. If you have a critical or abnormal lab result, we will notify you by phone as soon as possible.  Submit refill requests through [employer] or call your pharmacy and they will forward the refill request to us. Please allow 3 business days for your refill to be completed.          Additional Information About Your Visit        [employer] Information     [employer] gives you secure access to your electronic health record. If you see a primary care provider, you can also send messages to your care team and make appointments. If you have questions, please call your primary care clinic.  If you do not have a primary care provider, please call [phone] and they will assist you.        Care EveryWhere ID     This is your Care EveryWhere ID. This could be used by other organizations to access your [facility] medical records  [id number]        Your Vitals Were     Pulse Height Pulse Oximetry BMI (Body Mass Index)          86 1.676 m (5' 6\") 93% 42.58 kg/m2         Blood Pressure from Last 3 Encounters:   10/15/18 132/85   02/06/18 132/72   01/02/18 " 120/78    Weight from Last 3 Encounters:   10/15/18 119.7 kg (263 lb 12.8 oz)   02/06/18 115.4 kg (254 lb 8 oz)   01/02/18 115.6 kg (254 lb 12.8 oz)               Primary Care Provider Office Phone # Fax #    Yamilka Mirian Hill PA-C [phone] [address] [facility] [address]        Equal Access to Services     YVONNE NICHOLS : Hadii aad ku hadasho Soomaali, waaxda luqadaha, qaybta kaalmada adeegyada, waxay idiin hayaan adeeg kharagerald lasasha . So [facility] [phone].    ATENCIÓN: Si habla español, tiene a fowler disposición servicios gratuitos de asistencia lingüística. [facility] [phone].    We comply with applicable federal civil rights laws and [place] laws. We do not discriminate on the basis of race, color, national origin, age, disability, sex, sexual orientation, or gender identity.            Thank you!     Thank you for choosing [facility]  for your care. Our goal is always to provide you with excellent care. Hearing back from our patients is one way we can continue to improve our services. Please take a few minutes to complete the written survey that you may receive in the mail after your visit with us. Thank you!             Your Updated Medication List - Protect others around you: Learn how to safely use, store and throw away your medicines at www.disposemymeds.org.          This list is accurate as of 10/15/18 11:46 AM.  Always use your most recent med list.                   Brand Name Dispense Instructions for use Diagnosis    ALEVE PO      Take 220 mg by mouth        IBUPROFEN PO           metroNIDAZOLE 0.75 % topical gel    METROGEL     USE 1 APPLICATION QD        Multi-vitamin Tabs tablet   Generic drug:  multivitamin, therapeutic with minerals      1 tablet daily

## 2024-10-28 DIAGNOSIS — I10 HYPERTENSION GOAL BP (BLOOD PRESSURE) < 140/90: ICD-10-CM

## 2024-10-28 RX ORDER — LOSARTAN POTASSIUM 100 MG/1
100 TABLET ORAL DAILY
Qty: 30 TABLET | Refills: 0 | Status: SHIPPED | OUTPATIENT
Start: 2024-10-28

## 2024-10-30 RX ORDER — ATENOLOL 25 MG/1
25 TABLET ORAL DAILY
Qty: 30 TABLET | Refills: 0 | Status: SHIPPED | OUTPATIENT
Start: 2024-10-30